# Patient Record
Sex: FEMALE | Race: BLACK OR AFRICAN AMERICAN | Employment: FULL TIME | URBAN - METROPOLITAN AREA
[De-identification: names, ages, dates, MRNs, and addresses within clinical notes are randomized per-mention and may not be internally consistent; named-entity substitution may affect disease eponyms.]

---

## 2017-01-03 RX ORDER — NAPROXEN 500 MG/1
TABLET ORAL
Qty: 60 TAB | Refills: 0 | Status: SHIPPED | OUTPATIENT
Start: 2017-01-03 | End: 2017-02-25 | Stop reason: SDUPTHER

## 2017-01-06 RX ORDER — ONDANSETRON 2 MG/ML
8 INJECTION INTRAMUSCULAR; INTRAVENOUS ONCE
Status: COMPLETED | OUTPATIENT
Start: 2017-01-10 | End: 2017-01-10

## 2017-01-06 RX ORDER — DOXORUBICIN HYDROCHLORIDE 2 MG/ML
56 INJECTION, SOLUTION INTRAVENOUS ONCE
Status: COMPLETED | OUTPATIENT
Start: 2017-01-10 | End: 2017-01-10

## 2017-01-06 RX ORDER — DEXAMETHASONE SODIUM PHOSPHATE 4 MG/ML
12 INJECTION, SOLUTION INTRA-ARTICULAR; INTRALESIONAL; INTRAMUSCULAR; INTRAVENOUS; SOFT TISSUE
Status: DISPENSED | OUTPATIENT
Start: 2017-01-10 | End: 2017-01-11

## 2017-01-06 RX ORDER — SODIUM CHLORIDE 9 MG/ML
25 INJECTION, SOLUTION INTRAVENOUS CONTINUOUS
Status: DISCONTINUED | OUTPATIENT
Start: 2017-01-10 | End: 2017-01-14 | Stop reason: HOSPADM

## 2017-01-10 ENCOUNTER — HOSPITAL ENCOUNTER (OUTPATIENT)
Dept: INFUSION THERAPY | Age: 47
Discharge: HOME | End: 2017-01-10
Payer: COMMERCIAL

## 2017-01-10 ENCOUNTER — OFFICE VISIT (OUTPATIENT)
Dept: ONCOLOGY | Age: 47
End: 2017-01-10

## 2017-01-10 ENCOUNTER — HOSPITAL ENCOUNTER (OUTPATIENT)
Dept: GENERAL RADIOLOGY | Age: 47
Discharge: HOME | End: 2017-01-10
Payer: COMMERCIAL

## 2017-01-10 VITALS
RESPIRATION RATE: 18 BRPM | BODY MASS INDEX: 46.44 KG/M2 | WEIGHT: 246 LBS | SYSTOLIC BLOOD PRESSURE: 158 MMHG | HEART RATE: 102 BPM | TEMPERATURE: 98.8 F | OXYGEN SATURATION: 98 % | HEIGHT: 61 IN | DIASTOLIC BLOOD PRESSURE: 82 MMHG

## 2017-01-10 VITALS
BODY MASS INDEX: 46.44 KG/M2 | HEIGHT: 61 IN | HEART RATE: 92 BPM | RESPIRATION RATE: 16 BRPM | TEMPERATURE: 97.4 F | WEIGHT: 246 LBS | DIASTOLIC BLOOD PRESSURE: 88 MMHG | SYSTOLIC BLOOD PRESSURE: 127 MMHG

## 2017-01-10 DIAGNOSIS — Z98.890 S/P LUMPECTOMY, LEFT BREAST: ICD-10-CM

## 2017-01-10 DIAGNOSIS — C50.912 STAGE 2 CARCINOMA OF BREAST, ER-, LEFT (HCC): Primary | ICD-10-CM

## 2017-01-10 DIAGNOSIS — R05.9 COUGH: ICD-10-CM

## 2017-01-10 DIAGNOSIS — K52.1 CHEMOTHERAPY INDUCED DIARRHEA: ICD-10-CM

## 2017-01-10 DIAGNOSIS — T45.1X5A CHEMOTHERAPY INDUCED DIARRHEA: ICD-10-CM

## 2017-01-10 DIAGNOSIS — Z17.1 STAGE 2 CARCINOMA OF BREAST, ER-, LEFT (HCC): Primary | ICD-10-CM

## 2017-01-10 LAB
ALBUMIN SERPL BCP-MCNC: 3.5 G/DL (ref 3.5–5)
ALBUMIN/GLOB SERPL: 1.1 {RATIO} (ref 1.1–2.2)
ALP SERPL-CCNC: 84 U/L (ref 45–117)
ALT SERPL-CCNC: 21 U/L (ref 12–78)
ANION GAP BLD CALC-SCNC: 10 MMOL/L (ref 5–15)
AST SERPL W P-5'-P-CCNC: 12 U/L (ref 15–37)
BASOPHILS # BLD AUTO: 0 K/UL (ref 0–0.1)
BASOPHILS # BLD: 0 % (ref 0–1)
BILIRUB SERPL-MCNC: 0.3 MG/DL (ref 0.2–1)
BUN SERPL-MCNC: 15 MG/DL (ref 6–20)
BUN/CREAT SERPL: 24 (ref 12–20)
CALCIUM SERPL-MCNC: 8.9 MG/DL (ref 8.5–10.1)
CHLORIDE SERPL-SCNC: 107 MMOL/L (ref 97–108)
CO2 SERPL-SCNC: 25 MMOL/L (ref 21–32)
CREAT SERPL-MCNC: 0.62 MG/DL (ref 0.55–1.02)
EOSINOPHIL # BLD: 0 K/UL (ref 0–0.4)
EOSINOPHIL NFR BLD: 0 % (ref 0–7)
ERYTHROCYTE [DISTWIDTH] IN BLOOD BY AUTOMATED COUNT: 15.9 % (ref 11.5–14.5)
GLOBULIN SER CALC-MCNC: 3.3 G/DL (ref 2–4)
GLUCOSE SERPL-MCNC: 227 MG/DL (ref 65–100)
HCT VFR BLD AUTO: 34.1 % (ref 35–47)
HGB BLD-MCNC: 11 G/DL (ref 11.5–16)
LYMPHOCYTES # BLD AUTO: 6 % (ref 12–49)
LYMPHOCYTES # BLD: 1 K/UL (ref 0.8–3.5)
MCH RBC QN AUTO: 28.1 PG (ref 26–34)
MCHC RBC AUTO-ENTMCNC: 32.3 G/DL (ref 30–36.5)
MCV RBC AUTO: 87.2 FL (ref 80–99)
MONOCYTES # BLD: 1 K/UL (ref 0–1)
MONOCYTES NFR BLD AUTO: 6 % (ref 5–13)
NEUTS SEG # BLD: 15.2 K/UL (ref 1.8–8)
NEUTS SEG NFR BLD AUTO: 88 % (ref 32–75)
PLATELET # BLD AUTO: 349 K/UL (ref 150–400)
POTASSIUM SERPL-SCNC: 4.5 MMOL/L (ref 3.5–5.1)
PROT SERPL-MCNC: 6.8 G/DL (ref 6.4–8.2)
RBC # BLD AUTO: 3.91 M/UL (ref 3.8–5.2)
SODIUM SERPL-SCNC: 142 MMOL/L (ref 136–145)
WBC # BLD AUTO: 17.2 K/UL (ref 3.6–11)

## 2017-01-10 PROCEDURE — 80053 COMPREHEN METABOLIC PANEL: CPT | Performed by: INTERNAL MEDICINE

## 2017-01-10 PROCEDURE — 74011250636 HC RX REV CODE- 250/636: Performed by: INTERNAL MEDICINE

## 2017-01-10 PROCEDURE — 36415 COLL VENOUS BLD VENIPUNCTURE: CPT | Performed by: INTERNAL MEDICINE

## 2017-01-10 PROCEDURE — 74011000250 HC RX REV CODE- 250: Performed by: INTERNAL MEDICINE

## 2017-01-10 PROCEDURE — 85025 COMPLETE CBC W/AUTO DIFF WBC: CPT | Performed by: INTERNAL MEDICINE

## 2017-01-10 PROCEDURE — 96367 TX/PROPH/DG ADDL SEQ IV INF: CPT

## 2017-01-10 PROCEDURE — 74011000258 HC RX REV CODE- 258: Performed by: INTERNAL MEDICINE

## 2017-01-10 PROCEDURE — 96413 CHEMO IV INFUSION 1 HR: CPT

## 2017-01-10 PROCEDURE — 96417 CHEMO IV INFUS EACH ADDL SEQ: CPT

## 2017-01-10 PROCEDURE — 71020 XR CHEST PA LAT: CPT

## 2017-01-10 PROCEDURE — 96375 TX/PRO/DX INJ NEW DRUG ADDON: CPT

## 2017-01-10 RX ORDER — BENZONATATE 100 MG/1
100 CAPSULE ORAL
Qty: 21 CAP | Refills: 1 | Status: SHIPPED | OUTPATIENT
Start: 2017-01-10 | End: 2017-01-17

## 2017-01-10 RX ORDER — HEPARIN 100 UNIT/ML
500 SYRINGE INTRAVENOUS AS NEEDED
Status: ACTIVE | OUTPATIENT
Start: 2017-01-10 | End: 2017-01-11

## 2017-01-10 RX ORDER — SODIUM CHLORIDE 0.9 % (FLUSH) 0.9 %
5-10 SYRINGE (ML) INJECTION EVERY 8 HOURS
Status: ACTIVE | OUTPATIENT
Start: 2017-01-10 | End: 2017-01-11

## 2017-01-10 RX ORDER — SODIUM CHLORIDE 9 MG/ML
10 INJECTION INTRAMUSCULAR; INTRAVENOUS; SUBCUTANEOUS AS NEEDED
Status: DISPENSED | OUTPATIENT
Start: 2017-01-10 | End: 2017-01-11

## 2017-01-10 RX ADMIN — DOXORUBICIN HYDROCHLORIDE 56 MG: 2 INJECTION, SOLUTION INTRAVENOUS at 11:43

## 2017-01-10 RX ADMIN — ONDANSETRON 8 MG: 2 INJECTION INTRAMUSCULAR; INTRAVENOUS at 10:30

## 2017-01-10 RX ADMIN — SODIUM CHLORIDE 150 MG: 900 INJECTION, SOLUTION INTRAVENOUS at 11:05

## 2017-01-10 RX ADMIN — Medication 10 ML: at 10:27

## 2017-01-10 RX ADMIN — DEXAMETHASONE SODIUM PHOSPHATE 12 MG: 4 INJECTION, SOLUTION INTRA-ARTICULAR; INTRALESIONAL; INTRAMUSCULAR; INTRAVENOUS; SOFT TISSUE at 10:27

## 2017-01-10 RX ADMIN — SODIUM CHLORIDE 25 ML/HR: 900 INJECTION, SOLUTION INTRAVENOUS at 10:26

## 2017-01-10 RX ADMIN — DOCETAXEL 170 MG: 10 INJECTION, SOLUTION INTRAVENOUS at 12:53

## 2017-01-10 RX ADMIN — CYCLOPHOSPHAMIDE 1110 MG: 1 INJECTION, POWDER, FOR SOLUTION INTRAVENOUS; ORAL at 12:00

## 2017-01-10 RX ADMIN — SODIUM CHLORIDE 10 ML: 9 INJECTION, SOLUTION INTRAMUSCULAR; INTRAVENOUS; SUBCUTANEOUS at 10:27

## 2017-01-10 RX ADMIN — Medication 10 ML: at 16:49

## 2017-01-10 RX ADMIN — SODIUM CHLORIDE, PRESERVATIVE FREE 500 UNITS: 5 INJECTION INTRAVENOUS at 16:49

## 2017-01-10 NOTE — MR AVS SNAPSHOT
Visit Information Date & Time Provider Department Dept. Phone Encounter #  
 1/10/2017  8:45 AM Janes Canela  [facility] Oncology at [address] [id number] Your Appointments 1/31/2017  8:15 AM  
Follow Up with Janes Canela  [facility] Oncology at [facility]) Appt Note: f/u infusion day [address] [facility] [address]  
[phone]  
  
   
 [address] [address] CALL [facility] [address]  
  
    
 2/21/2017  8:45 AM  
Follow Up with Janes Canela  [facility] Oncology at [facility]) Appt Note: f/u infusion day [address] [facility] [address]  
[phone] 3/14/2017  8:15 AM  
Follow Up with Janes Canela  [facility] Oncology at [facility]) Appt Note: f/u infusion day [address] [facility] [address]  
[phone] Upcoming Health Maintenance Date Due Pneumococcal 19-64 Highest Risk (1 of 3 - PCV13) 6/8/1988 DTaP/Tdap/Td series (1 - Tdap) 6/8/1990 EYE EXAM RETINAL OR DILATED Q1 6/18/2016 FOOT EXAM Q1 7/7/2016 HEMOGLOBIN A1C Q6M 1/14/2017 MICROALBUMIN Q1 7/14/2017 LIPID PANEL Q1 7/14/2017 PAP AKA CERVICAL CYTOLOGY 8/6/2017 Allergies as of 1/10/2017  Review Complete On: 1/10/2017 By: Darcy Wills LPN Severity Noted Reaction Type Reactions Codeine  02/21/2011    Diarrhea Pravastatin  08/26/2016    Myalgia Terrible leg aching. Theophyl  09/29/2014   Side Effect Diarrhea, Nausea and Vomiting Theophylline  02/21/2011    Diarrhea Current Immunizations  Reviewed on 1/10/2017 Name Date Influenza Vaccine 9/28/2016 Reviewed by Natalee Wilkinson RN on 1/10/2017 at  8:27 AM  
You Were Diagnosed With   
  
 Codes Comments Cough    -  Primary ICD-10-CM: E68 ICD-9-CM: 453. 2 Vitals BP Pulse Temp Resp Height(growth percentile) Weight(growth percentile) 158/82 (!) 102 98.8 °F (37.1 °C) (Oral) 18 5' 1\" (1.549 m) 246 lb (111.6 kg) SpO2 BMI OB Status Smoking Status 98% 46.48 kg/m2 Hysterectomy Never Smoker Vitals History BMI and BSA Data Body Mass Index Body Surface Area  
 46.48 kg/m 2 2.19 m 2 Preferred Pharmacy Pharmacy Name Phone [facility] [address] [place] [address] [address] [facility] [phone] Your Updated Medication List  
  
   
This list is accurate as of: 1/10/17  9:27 AM.  Always use your most recent med list.  
  
  
  
  
 acetaminophen 325 mg tablet Commonly known as:  TYLENOL Take 650 mg by mouth every four (4) hours as needed for Pain. * albuterol 90 mcg/actuation inhaler Commonly known as:  PROVENTIL HFA, VENTOLIN HFA, PROAIR HFA Take 2 Puffs by inhalation every four (4) hours as needed for Wheezing. * albuterol 2.5 mg /3 mL (0.083 %) nebulizer solution Commonly known as:  PROVENTIL VENTOLIN  
  
 benzonatate 100 mg capsule Commonly known as:  TESSALON Take 1 Cap by mouth three (3) times daily as needed for Cough for up to 7 days. buPROPion  mg SR tablet Commonly known as:  WELLBUTRIN SR  
TAKE 1 TABLET BY MOUTH TWICE DAILY CRANIAL PROSTHESIS Misc Wig for chemo/ breast cancer  
  
 dexamethasone 4 mg tablet Commonly known as:  DECADRON Take 2 tablets (8mg) by mouth the day before, the day off, and the day after chemotherapy. dicyclomine 10 mg capsule Commonly known as:  BENTYL Take 2 Caps by mouth four (4) times daily. diphenoxylate-atropine 2.5-0.025 mg per tablet Commonly known as:  LOMOTIL Take 1 Tab by mouth four (4) times daily as needed for Diarrhea. Max Daily Amount: 4 Tabs. FINACEA 15 % topical gel Generic drug:  azelaic acid HYDROcodone-acetaminophen 7.5-325 mg per tablet Commonly known as:  Flory Oyster Take 1 Tab by mouth every four (4) hours as needed for Pain. Max Daily Amount: 6 Tabs. hydroquinone 2 % topical cream  
Apply  to affected area two (2) times a day. lidocaine-prilocaine topical cream  
Commonly known as:  EMLA Apply  to affected area as needed for Pain. Apply 30 to 60 minutes prior to port access. Mary Pen Needle 32 gauge x 5/32\" Ndle Generic drug:  Insulin Needles (Disposable) USE AS DIRECTED TWICE DAILY  
  
 naproxen 500 mg tablet Commonly known as:  NAPROSYN  
TAKE 1 TABLET BY MOUTH TWICE DAILY WITH MEALS NovoLOG Mix 70-30 FlexPen 100 unit/mL (70-30) Inpn Generic drug:  insulin aspart protamine/insulin aspart USE 15 UNITS BEFORE BREAKFAST AND DINNER  
  
 ondansetron 8 mg disintegrating tablet Commonly known as:  ZOFRAN ODT Take 1 Tab by mouth every eight (8) hours as needed for Nausea. One Touch Delica 33 gauge Misc Generic drug:  lancets TEST THREE TIMES DAILY  
  
 ONETOUCH ULTRA TEST strip Generic drug:  glucose blood VI test strips TEST 3 TIMES A DAY prochlorperazine 10 mg tablet Commonly known as:  COMPAZINE Take 1 Tab by mouth every six (6) hours as needed. Indications: CANCER CHEMOTHERAPY-INDUCED NAUSEA AND VOMITING  
  
 tretinoin 0.025 % tpical gel VICTOZA 2-HUGO 0.6 mg/0.1 mL (18 mg/3 mL) sub-q pen Generic drug:  Liraglutide INJECT 0.3 ML(1.8MG) UNDER THE SKIN EVERY DAY  
  
 * Notice: This list has 2 medication(s) that are the same as other medications prescribed for you. Read the directions carefully, and ask your doctor or other care provider to review them with you. Prescriptions Sent to Pharmacy Refills  
 benzonatate (TESSALON) 100 mg capsule 1 Sig: Take 1 Cap by mouth three (3) times daily as needed for Cough for up to 7 days.   
 Class: Normal  
 Pharmacy: [facility] [address] [address] AT [address] [facility]  #: [phone] Route: Oral  
  
To-Do List   
 01/10/2017 Imaging:  XR CHEST PA LAT   
  
 01/12/2017 8:30 AM  
  Appointment with Catrachitabry Samantha at [address] ([phone])  
  
 01/31/2017 8:00 AM  
  Appointment with [facility] at [address] ([phone])  
  
 02/02/2017 8:30 AM  
  Appointment with Domenicamelony Samantha at [address] ([phone])  
  
 02/21/2017 8:00 AM  
  Appointment with [facility] at [address] ([phone])  
  
 02/23/2017 8:30 AM  
  Appointment with Jorge Singh at [address] [place] [address] [address] ([phone])  
  
 03/14/2017 8:00 AM  
  [facility] [address] ([phone])  
  
 03/16/2017 8:30 AM  
  Appointment with Catrachitabry Samantha at [address] [facility] [address] ([phone]) [facility]! Dear Yeison Denise: 
Thank you for requesting a [employer] account. Our records indicate that you already have an active [employer] account. You can access your account anytime at [email or web address] Did you know that you can access your hospital and ER discharge instructions at any time in [employer]? You can also review all of your test results from your hospital stay or ER visit. Additional Information If you have questions, please visit the Frequently Asked Questions section of the [employer] website at [email or web address] Remember, AgentBridge is NOT to be used for urgent needs. For medical emergencies, dial 911. Now available from your iPhone and Android! Please provide this summary of care documentation to your next provider. Your primary care clinician is listed as Buddy Sharma. If you have any questions after today's visit, please call [phone].

## 2017-01-10 NOTE — PROGRESS NOTES
[facility] Note:  [id number] arrived at [facility] ambulatory and in no distress for C3  Assessment stable no new complaints voiced. Saw Gilbert Loges. CXR normal.    Pt got headache after Adriamycin. Pushing slower might make difference or pt is tired from working nights. Medications received:  Decadron  Zofran  Emend  Adriamycin  Cytoxan  Taxotere      1500 Tolerated treatment well, no adverse reaction noted. D/Cd from [facility] ambulatory and in no distress accompanied by family  Next appt 0830  1/12   And 1/31 chemo  Visit Vitals    Ht 5' 1\" (1.549 m)    Wt 111.6 kg (246 lb)    BMI 46.48 kg/m2     Recent Results (from the past 12 hour(s))   CBC WITH AUTOMATED DIFF    Collection Time: 01/10/17  8:36 AM   Result Value Ref Range    WBC 17.2 (H) 3.6 - 11.0 K/uL    RBC 3.91 3.80 - 5.20 M/uL    HGB 11.0 (L) 11.5 - 16.0 g/dL    HCT 34.1 (L) 35.0 - 47.0 %    MCV 87.2 80.0 - 99.0 FL    MCH 28.1 26.0 - 34.0 PG    MCHC 32.3 30.0 - 36.5 g/dL    RDW 15.9 (H) 11.5 - 14.5 %    PLATELET 609 311 - 931 K/uL    NEUTROPHILS 88 (H) 32 - 75 %    LYMPHOCYTES 6 (L) 12 - 49 %    MONOCYTES 6 5 - 13 %    EOSINOPHILS 0 0 - 7 %    BASOPHILS 0 0 - 1 %    ABS. NEUTROPHILS 15.2 (H) 1.8 - 8.0 K/UL    ABS. LYMPHOCYTES 1.0 0.8 - 3.5 K/UL    ABS. MONOCYTES 1.0 0.0 - 1.0 K/UL    ABS. EOSINOPHILS 0.0 0.0 - 0.4 K/UL    ABS. BASOPHILS 0.0 0.0 - 0.1 K/UL   METABOLIC PANEL, COMPREHENSIVE    Collection Time: 01/10/17  8:36 AM   Result Value Ref Range    Sodium 142 136 - 145 mmol/L    Potassium 4.5 3.5 - 5.1 mmol/L    Chloride 107 97 - 108 mmol/L    CO2 25 21 - 32 mmol/L    Anion gap 10 5 - 15 mmol/L    Glucose 227 (H) 65 - 100 mg/dL    BUN 15 6 - 20 MG/DL    Creatinine 0.62 0.55 - 1.02 MG/DL    BUN/Creatinine ratio 24 (H) 12 - 20      GFR est AA >60 >60 ml/min/1.73m2    GFR est non-AA >60 >60 ml/min/1.73m2    Calcium 8.9 8.5 - 10.1 MG/DL    Bilirubin, total 0.3 0.2 - 1.0 MG/DL    ALT 21 12 - 78 U/L    AST 12 (L) 15 - 37 U/L    Alk.  phosphatase 84 45 - 117 U/L    Protein, total 6.8 6.4 - 8.2 g/dL    Albumin 3.5 3.5 - 5.0 g/dL    Globulin 3.3 2.0 - 4.0 g/dL    A-G Ratio 1.1 1.1 - 2.2

## 2017-01-10 NOTE — PROGRESS NOTES
HEME/ONC PROGRESS NOTE       Christ Green is a 51 y.o. 1970 female and presents with Follow-up; Breast Cancer; and Cough    CC  Triple neg left breast cancer 8/16    HPI  Pt seen today for office f/u for breast cancer  She is post lump/ reduction surgery. Patient is here for cycle 3 adjuvant chemotherapy with TAC. She reports she had nasal drainage, cough that is non-productive. She denies shortness of breath. She does have an inhaler and has started to use it recently. She has noticed wheezing and has a history of asthma. She reports she overall feels well but has a persistent cough which has stayed the same. She denies headaches. She has taken no medications for the cough besides Mucinex which she has been taking once daily. She has had no fevers or chills. She reports she did have diarrhea following cycle 2 of chemotherapy. She ate BRAT diet and she noticed relief while taking Zofran. She denies nausea or vomiting. She does notice tingling to her fingers and toes bilaterally. She reports symptoms are intermittent and she denies pain. She does have a history of diabetes and has experienced neuropathy in the past. She denies pain. She has noticed fatigue for the days after chemotherapy    Last visit:  consult for new triple neg left breast cancer. Abnormal mammo 6/16. Biopsy 8/16 triple neg IDC. MRI breast 8mm mass with another area of DCIS. Lumpectomy/ reuction 10/16 shows 3 cm mass with neg LN 0/4. Pt has hx of DM/ asthma/ IBS/ medical noncompliance at PCP. Pt is here to discuss adjuvant therapy. 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 Here with son. Port by surgery. DX   Encounter Diagnoses   Name Primary?     Cough Yes    Stage 2 carcinoma of breast, ER-, left (HCC)     S/P lumpectomy, left breast     Chemotherapy induced diarrhea         STAGE: T2N0 triple neg    TREATMENT COURSE: lumpectomy 10/16 with b/l reduction      Past Medical History   Diagnosis Date    Arthritis     Asthma  Cancer ([facility] [address]      BREAST left    Chronic pain     Diabetes (HCC)     Dyslipidemia     IBS (irritable bowel syndrome)     Nausea & vomiting     Noncompliance     Obesity     Psychiatric disorder     S/P breast biopsy, left 16    Sinusitis     Stress at home      Past Surgical History   Procedure Laterality Date    Hx hysterectomy       full    Hx  section       X2    Hx gyn       ABLATION    Hx hernia repair       AS INFANT    Hx orthopaedic       L THUMB    Hx breast lumpectomy Left 10/13/2016     LEFT BREAST REDUCTION LUMPECTOMY, LEFT SENTINEL NODE BIOPSY, LEFT BRACKETED NEEDLE LOCALIZATION, LEFT BREAST RECONSTRUCTION performed by Josie Sue MD at [address] Hx breast reconstruction Bilateral 10/13/2016     BREAST REDUCTION performed by Deondre Burger MD at [address] [facility] [address] History     Social History    Marital status: SINGLE     Spouse name: N/A    Number of children: N/A    Years of education: N/A     Social History Main Topics    Smoking status: Never Smoker    Smokeless tobacco: Never Used    Alcohol use No    Drug use: No    Sexual activity: Not Asked     Other Topics Concern    None     Social History Narrative    ** Merged History Encounter **          Family History   Problem Relation Age of Onset    Heart Disease Mother     Hypertension Mother     Heart Disease Father     Seizures Sister     Anesth Problems Neg Hx        Current Outpatient Prescriptions   Medication Sig Dispense Refill    benzonatate (TESSALON) 100 mg capsule Take 1 Cap by mouth three (3) times daily as needed for Cough for up to 7 days.  21 Cap 1    naproxen (NAPROSYN) 500 mg tablet TAKE 1 TABLET BY MOUTH TWICE DAILY WITH MEALS 60 Tab 0    buPROPion SR (WELLBUTRIN SR) 150 mg SR tablet TAKE 1 TABLET BY MOUTH TWICE DAILY 30 Tab 3    ONE TOUCH DELICA 33 gauge misc TEST THREE TIMES DAILY 100 Lancet 11    diphenoxylate-atropine (LOMOTIL) 2.5-0.025 mg per tablet Take 1 Tab by mouth four (4) times daily as needed for Diarrhea. Max Daily Amount: 4 Tabs. 30 Tab 1    lidocaine-prilocaine (EMLA) topical cream Apply  to affected area as needed for Pain. Apply 30 to 60 minutes prior to port access. 30 g 0    dexamethasone (DECADRON) 4 mg tablet Take 2 tablets (8mg) by mouth the day before, the day off, and the day after chemotherapy. 30 Tab 1    ondansetron (ZOFRAN ODT) 8 mg disintegrating tablet Take 1 Tab by mouth every eight (8) hours as needed for Nausea. 30 Tab 2    prochlorperazine (COMPAZINE) 10 mg tablet Take 1 Tab by mouth every six (6) hours as needed. Indications: CANCER CHEMOTHERAPY-INDUCED NAUSEA AND VOMITING 30 Tab 2    ONETOUCH ULTRA TEST strip TEST 3 TIMES A  Strip 11    albuterol (PROVENTIL VENTOLIN) 2.5 mg /3 mL (0.083 %) nebulizer solution       acetaminophen (TYLENOL) 325 mg tablet Take 650 mg by mouth every four (4) hours as needed for Pain.  CRANIAL PROSTHESIS misc Wig for chemo/ breast cancer 2 Each 1    hydroquinone 2 % topical cream Apply  to affected area two (2) times a day.  VICTOZA 2-HUGO 0.6 mg/0.1 mL (18 mg/3 mL) sub-q pen INJECT 0.3 ML(1.8MG) UNDER THE SKIN EVERY DAY 6 mL 11    dicyclomine (BENTYL) 10 mg capsule Take 2 Caps by mouth four (4) times daily. (Patient taking differently: Take 20 mg by mouth four (4) times daily. Patient stated she usually takes 10mg twice daily) 120 Cap 11    NOVOLOG MIX 70-30 FLEXPEN 100 unit/mL (70-30) inpn USE 15 UNITS BEFORE BREAKFAST AND DINNER 15 mL 11    KELLY PEN NEEDLE 32 gauge x 5/32\" ndle USE AS DIRECTED TWICE DAILY 100 Pen Needle 11    FINACEA 15 % topical gel   5    tretinoin 0.025 % tpical gel   3    albuterol (PROVENTIL HFA, VENTOLIN HFA, PROAIR HFA) 90 mcg/actuation inhaler Take 2 Puffs by inhalation every four (4) hours as needed for Wheezing.  1 Inhaler 11    HYDROcodone-acetaminophen (NORCO) 7.5-325 mg per tablet Take 1 Tab by mouth every four (4) hours as needed for Pain. Max Daily Amount: 6 Tabs. 35 Tab 0     Facility-Administered Medications Ordered in Other Visits   Medication Dose Route Frequency Provider Last Rate Last Dose    heparin (porcine) pf 500 Units  500 Units IntraVENous PRN Tamiko Bombard, DO   500 Units at 01/10/17 1649    sodium chloride 0.9 % injection 10 mL  10 mL IntraVENous PRN Tamiko Bombard, DO   10 mL at 01/10/17 1027    [START ON 1/12/2017] pegfilgrastim (NEULASTA) injection 6 mg  6 mg SubCUTAneous ONCE Tamiko Bombard, DO        dexamethasone (DECADRON) 4 mg/mL injection 12 mg  12 mg IntraVENous ONCE PRN Tamiko Bombard, DO   12 mg at 01/10/17 1027    0.9% sodium chloride infusion  25 mL/hr IntraVENous CONTINUOUS Tamiko Bombard, DO   Stopped at 01/10/17 1500       Allergies   Allergen Reactions    Codeine Diarrhea    Pravastatin Myalgia     Terrible leg aching.  Theophyl Diarrhea and Nausea and Vomiting    Theophylline Diarrhea       Review of Systems    A comprehensive review of systems was negative except for: per HPI     Objective:  Visit Vitals    /82    Pulse (!) 102    Temp 98.8 °F (37.1 °C) (Oral)    Resp 18    Ht 5' 1\" (1.549 m)    Wt 246 lb (111.6 kg)    SpO2 98%  Comment: RA    BMI 46.48 kg/m2         Physical Exam:   General appearance - alert, well appearing, and in no distress, oriented to person, place, and time and overweight  Mental status - alert, oriented to person, place, and time, normal mood, behavior, speech, dress, motor activity, and thought processes  EYE-conj clear  Mouth - mucous membranes moist   Neck - supple  Chest - clear to auscultation bilaterally  Heart - normal rate and regular rhythm   Abdomen - round, soft, nontender  Ext-no pedal edema noted  Skin-Warm and dry. Intact without rash.    Neuro -alert, oriented, normal speech, no focal findings or movement disorder noted    Diagnostic Imaging   reviewed  XR Results (most recent):    [facility] on 01/10/17   XR CHEST PA LAT   Narrative EXAM:  XR CHEST PA LAT    INDICATION:  Cough for 2 weeks. History of breast cancer. Currently undergoing  chemotherapy. COMPARISON: 11/17/2016. TECHNIQUE: PA and lateral chest views    FINDINGS: The right chest Port-A-Cath is slightly retracted terminating in  profile with the right subclavian vein. The cardiomediastinal contours are  stable. The pulmonary vasculature is within normal limits. The lungs and pleural spaces are clear. There is no pneumothorax. The bones and  upper abdomen are stable. Impression IMPRESSION:    The lungs and pleural spaces are clear. The right chest Port-A-Cath terminates  in profile with the right subclavian vein. Lab Results  reviewed  Lab Results   Component Value Date/Time    WBC 17.2 01/10/2017 08:36 AM    HGB 11.0 01/10/2017 08:36 AM    HCT 34.1 01/10/2017 08:36 AM    PLATELET 670 33/28/1591 08:36 AM    MCV 87.2 01/10/2017 08:36 AM       Lab Results   Component Value Date/Time    Sodium 142 01/10/2017 08:36 AM    Potassium 4.5 01/10/2017 08:36 AM    Chloride 107 01/10/2017 08:36 AM    CO2 25 01/10/2017 08:36 AM    Anion gap 10 01/10/2017 08:36 AM    Glucose 227 01/10/2017 08:36 AM    BUN 15 01/10/2017 08:36 AM    Creatinine 0.62 01/10/2017 08:36 AM    BUN/Creatinine ratio 24 01/10/2017 08:36 AM    GFR est AA >60 01/10/2017 08:36 AM    GFR est non-AA >60 01/10/2017 08:36 AM    Calcium 8.9 01/10/2017 08:36 AM    ALT 21 01/10/2017 08:36 AM    AST 12 01/10/2017 08:36 AM    Alk. phosphatase 84 01/10/2017 08:36 AM    Protein, total 6.8 01/10/2017 08:36 AM    Albumin 3.5 01/10/2017 08:36 AM    Globulin 3.3 01/10/2017 08:36 AM    A-G Ratio 1.1 01/10/2017 08:36 AM       Assessment/Plan:     Kalani Galarza is a  51 y.o. female and presents with Breast Cancer    CC  Triple neg left breast cancer 8/16    HPI  Pt seen today for office fu for triple neg left breast cancer. DX   Encounter Diagnoses   Name Primary?     Triple negative malignant neoplasm of breast ([facility] [address] Yes    S/P lumpectomy, left breast     Stage 2 carcinoma of breast, ER-, left ([facility] [address]     [facility] to [facility]     Type 2 diabetes mellitus without complication, unspecified long term insulin use status ([facility]     Irritable bowel syndrome without diarrhea     Essential hypertension       STAGE: T2N0 triple neg    TREATMENT COURSE: lumpectomy 101/6    1. Stage 2 triple neg breast cancer s/p lumpectomy/ b/l breast reduction/. She is here for cycle 3 adjuvant chemotherapy with TAC today. She tolerated cycle 2 better. Labs and VS are stable today. Chest xray ordered and was negative today. Will proceed with chemotherapy as ordered. 2. Diarrhea. Patient managed diarrhea with diet changes, no anti-diarrheal medication required. She does reports she took Zofran scheduled after chemotherapy which seemed to help with diarrhea as well. 3. HTN/ asthma/ DM. Per PCP. Blood glucose is elevated at home on the days she takes steroids per report. Blood glucose 227 today. 4. Cough. Cough has continued for about 3 weeks, non-productive. She reports she has no shortness of breath and no fevers. She has a history of asthma and has been using her inhaler as needed. Chest xray ordered today and was negative. Patient was encouraged to follow-up with PCP due to history of asthma. Advised to call with worsened symptoms and can send in antibiotic if needed. Seen in conjunction with Obi Moulton NP. Follow-up in 3 weeks with next chemotherapy. ICD-10-CM ICD-9-CM    1. Cough R05 786.2 XR CHEST PA LAT   2. Stage 2 carcinoma of breast, ER-, left (HCC) C50.912 174.9     Z17.1 V86.1    3. S/P lumpectomy, left breast Z90.12 V45.89    4.  Chemotherapy induced diarrhea K52.1 787.91     T45.1X5A E933.1        Current Outpatient Prescriptions   Medication Sig    benzonatate (TESSALON) 100 mg capsule Take 1 Cap by mouth three (3) times daily as needed for Cough for up to 7 days.  naproxen (NAPROSYN) 500 mg tablet TAKE 1 TABLET BY MOUTH TWICE DAILY WITH MEALS    buPROPion SR (WELLBUTRIN SR) 150 mg SR tablet TAKE 1 TABLET BY MOUTH TWICE DAILY    ONE TOUCH DELICA 33 gauge misc TEST THREE TIMES DAILY    diphenoxylate-atropine (LOMOTIL) 2.5-0.025 mg per tablet Take 1 Tab by mouth four (4) times daily as needed for Diarrhea. Max Daily Amount: 4 Tabs.  lidocaine-prilocaine (EMLA) topical cream Apply  to affected area as needed for Pain. Apply 30 to 60 minutes prior to port access.  dexamethasone (DECADRON) 4 mg tablet Take 2 tablets (8mg) by mouth the day before, the day off, and the day after chemotherapy.  ondansetron (ZOFRAN ODT) 8 mg disintegrating tablet Take 1 Tab by mouth every eight (8) hours as needed for Nausea.  prochlorperazine (COMPAZINE) 10 mg tablet Take 1 Tab by mouth every six (6) hours as needed. Indications: CANCER CHEMOTHERAPY-INDUCED NAUSEA AND VOMITING    ONETOUCH ULTRA TEST strip TEST 3 TIMES A DAY    albuterol (PROVENTIL VENTOLIN) 2.5 mg /3 mL (0.083 %) nebulizer solution     acetaminophen (TYLENOL) 325 mg tablet Take 650 mg by mouth every four (4) hours as needed for Pain.  CRANIAL PROSTHESIS misc Wig for chemo/ breast cancer    hydroquinone 2 % topical cream Apply  to affected area two (2) times a day.  VICTOZA 2-HUGO 0.6 mg/0.1 mL (18 mg/3 mL) sub-q pen INJECT 0.3 ML(1.8MG) UNDER THE SKIN EVERY DAY    dicyclomine (BENTYL) 10 mg capsule Take 2 Caps by mouth four (4) times daily. (Patient taking differently: Take 20 mg by mouth four (4) times daily.  Patient stated she usually takes 10mg twice daily)    NOVOLOG MIX 70-30 FLEXPEN 100 unit/mL (70-30) inpn USE 15 UNITS BEFORE BREAKFAST AND DINNER    KELLY PEN NEEDLE 32 gauge x 5/32\" ndle USE AS DIRECTED TWICE DAILY    FINACEA 15 % topical gel     tretinoin 0.025 % tpical gel     albuterol (PROVENTIL HFA, VENTOLIN HFA, PROAIR HFA) 90 mcg/actuation inhaler Take 2 Puffs by inhalation every four (4) hours as needed for Wheezing.  HYDROcodone-acetaminophen (NORCO) 7.5-325 mg per tablet Take 1 Tab by mouth every four (4) hours as needed for Pain. Max Daily Amount: 6 Tabs. No current facility-administered medications for this visit. Facility-Administered Medications Ordered in Other Visits   Medication Dose Route Frequency    heparin (porcine) pf 500 Units  500 Units IntraVENous PRN    sodium chloride 0.9 % injection 10 mL  10 mL IntraVENous PRN    [START ON 1/12/2017] pegfilgrastim (NEULASTA) injection 6 mg  6 mg SubCUTAneous ONCE    dexamethasone (DECADRON) 4 mg/mL injection 12 mg  12 mg IntraVENous ONCE PRN    0.9% sodium chloride infusion  25 mL/hr IntraVENous CONTINUOUS       continue present plan, call if any problems  There are no Patient Instructions on file for this visit. Follow-up Disposition:  Return in about 3 weeks (around 1/31/2017).     Mara Chapman NP

## 2017-01-10 NOTE — PROGRESS NOTES
50 y/o female here for f/u appt for ca breast. Receiving cycle 3 of TAC. Pt has been having congestion for about 3 weeks. Mucinex once nightly and has increased fluid intake. Constantly trying to clear her throat. Uses inhaler more. She reports she talks constantly at her job so this makes it worse. Pt is very tired. Ms. Eusebio James has a reminder for a \"due or due soon\" health maintenance. I have asked that she contact her primary care provider for follow-up on this health maintenance.

## 2017-01-12 ENCOUNTER — HOSPITAL ENCOUNTER (OUTPATIENT)
Dept: INFUSION THERAPY | Age: 47
Discharge: HOME | End: 2017-01-12
Payer: COMMERCIAL

## 2017-01-12 VITALS
TEMPERATURE: 98.4 F | HEART RATE: 81 BPM | RESPIRATION RATE: 18 BRPM | SYSTOLIC BLOOD PRESSURE: 117 MMHG | DIASTOLIC BLOOD PRESSURE: 72 MMHG

## 2017-01-12 NOTE — PROGRESS NOTES
Outpatient Infusion [facility] Short Visit Progress Note    0815 Pt admit to [place] for Neulasta ambulatory in stable condition. Assessment completed. No new concerns voiced. Visit Vitals    /72    Pulse 81    Temp 98.4 °F (36.9 °C)    Resp 18           Medications:  Neulasta SQ right arm     0835 Pt tolerated treatment well. D/c home ambulatory in no distress. Pt aware of next appointment scheduled for 1/31/17.

## 2017-01-27 RX ORDER — SODIUM CHLORIDE 9 MG/ML
25 INJECTION, SOLUTION INTRAVENOUS CONTINUOUS
Status: DISCONTINUED | OUTPATIENT
Start: 2017-01-31 | End: 2017-02-04 | Stop reason: HOSPADM

## 2017-01-27 RX ORDER — DEXAMETHASONE SODIUM PHOSPHATE 4 MG/ML
12 INJECTION, SOLUTION INTRA-ARTICULAR; INTRALESIONAL; INTRAMUSCULAR; INTRAVENOUS; SOFT TISSUE
Status: ACTIVE | OUTPATIENT
Start: 2017-01-31 | End: 2017-02-01

## 2017-01-27 RX ORDER — DOXORUBICIN HYDROCHLORIDE 2 MG/ML
56 INJECTION, SOLUTION INTRAVENOUS ONCE
Status: DISCONTINUED | OUTPATIENT
Start: 2017-01-31 | End: 2017-01-31 | Stop reason: DRUGHIGH

## 2017-01-27 RX ORDER — ONDANSETRON 2 MG/ML
8 INJECTION INTRAMUSCULAR; INTRAVENOUS ONCE
Status: COMPLETED | OUTPATIENT
Start: 2017-01-31 | End: 2017-01-31

## 2017-01-31 ENCOUNTER — OFFICE VISIT (OUTPATIENT)
Dept: ONCOLOGY | Age: 47
End: 2017-01-31

## 2017-01-31 ENCOUNTER — HOSPITAL ENCOUNTER (OUTPATIENT)
Dept: INFUSION THERAPY | Age: 47
Discharge: HOME | End: 2017-01-31
Payer: COMMERCIAL

## 2017-01-31 ENCOUNTER — DOCUMENTATION ONLY (OUTPATIENT)
Dept: ONCOLOGY | Age: 47
End: 2017-01-31

## 2017-01-31 VITALS
HEIGHT: 61 IN | RESPIRATION RATE: 16 BRPM | OXYGEN SATURATION: 99 % | SYSTOLIC BLOOD PRESSURE: 144 MMHG | HEART RATE: 84 BPM | DIASTOLIC BLOOD PRESSURE: 84 MMHG | BODY MASS INDEX: 46.44 KG/M2 | TEMPERATURE: 99.2 F | WEIGHT: 246 LBS

## 2017-01-31 VITALS
SYSTOLIC BLOOD PRESSURE: 130 MMHG | HEIGHT: 61 IN | TEMPERATURE: 98.1 F | BODY MASS INDEX: 46.54 KG/M2 | DIASTOLIC BLOOD PRESSURE: 84 MMHG | RESPIRATION RATE: 16 BRPM | OXYGEN SATURATION: 98 % | WEIGHT: 246.5 LBS | HEART RATE: 99 BPM

## 2017-01-31 DIAGNOSIS — C50.912 STAGE 2 CARCINOMA OF BREAST, ER-, LEFT (HCC): Primary | ICD-10-CM

## 2017-01-31 DIAGNOSIS — K59.00 CONSTIPATION, UNSPECIFIED CONSTIPATION TYPE: ICD-10-CM

## 2017-01-31 DIAGNOSIS — Z09 CHEMOTHERAPY FOLLOW-UP EXAMINATION: ICD-10-CM

## 2017-01-31 DIAGNOSIS — Z17.1 STAGE 2 CARCINOMA OF BREAST, ER-, LEFT (HCC): Primary | ICD-10-CM

## 2017-01-31 DIAGNOSIS — Z98.890 S/P LUMPECTOMY, LEFT BREAST: ICD-10-CM

## 2017-01-31 LAB
ALBUMIN SERPL BCP-MCNC: 3.4 G/DL (ref 3.5–5)
ALBUMIN/GLOB SERPL: 1 {RATIO} (ref 1.1–2.2)
ALP SERPL-CCNC: 74 U/L (ref 45–117)
ALT SERPL-CCNC: 22 U/L (ref 12–78)
ANION GAP BLD CALC-SCNC: 8 MMOL/L (ref 5–15)
AST SERPL W P-5'-P-CCNC: 10 U/L (ref 15–37)
BASO+EOS+MONOS # BLD AUTO: 1.2 K/UL (ref 0.2–1.2)
BASO+EOS+MONOS # BLD AUTO: 7 % (ref 3.2–16.9)
BILIRUB SERPL-MCNC: 0.3 MG/DL (ref 0.2–1)
BUN SERPL-MCNC: 17 MG/DL (ref 6–20)
BUN/CREAT SERPL: 24 (ref 12–20)
CALCIUM SERPL-MCNC: 8.9 MG/DL (ref 8.5–10.1)
CHLORIDE SERPL-SCNC: 105 MMOL/L (ref 97–108)
CO2 SERPL-SCNC: 26 MMOL/L (ref 21–32)
CREAT SERPL-MCNC: 0.72 MG/DL (ref 0.55–1.02)
DIFFERENTIAL METHOD BLD: ABNORMAL
ERYTHROCYTE [DISTWIDTH] IN BLOOD BY AUTOMATED COUNT: 16.7 % (ref 11.8–15.8)
GLOBULIN SER CALC-MCNC: 3.3 G/DL (ref 2–4)
GLUCOSE SERPL-MCNC: 230 MG/DL (ref 65–100)
HCT VFR BLD AUTO: 30.9 % (ref 35–47)
HGB BLD-MCNC: 9.9 G/DL (ref 11.5–16)
LYMPHOCYTES # BLD AUTO: 6 % (ref 12–49)
LYMPHOCYTES # BLD: 1.1 K/UL (ref 0.8–3.5)
MCH RBC QN AUTO: 28.2 PG (ref 26–34)
MCHC RBC AUTO-ENTMCNC: 32 G/DL (ref 30–36.5)
MCV RBC AUTO: 88 FL (ref 80–99)
NEUTS SEG # BLD: 14.7 K/UL (ref 1.8–8)
NEUTS SEG NFR BLD AUTO: 87 % (ref 32–75)
PLATELET # BLD AUTO: 446 K/UL (ref 150–400)
POTASSIUM SERPL-SCNC: 3.7 MMOL/L (ref 3.5–5.1)
PROT SERPL-MCNC: 6.7 G/DL (ref 6.4–8.2)
RBC # BLD AUTO: 3.51 M/UL (ref 3.8–5.2)
SODIUM SERPL-SCNC: 139 MMOL/L (ref 136–145)
WBC # BLD AUTO: 17 K/UL (ref 3.6–11)

## 2017-01-31 PROCEDURE — 80053 COMPREHEN METABOLIC PANEL: CPT | Performed by: INTERNAL MEDICINE

## 2017-01-31 PROCEDURE — 96411 CHEMO IV PUSH ADDL DRUG: CPT

## 2017-01-31 PROCEDURE — 96417 CHEMO IV INFUS EACH ADDL SEQ: CPT

## 2017-01-31 PROCEDURE — 96366 THER/PROPH/DIAG IV INF ADDON: CPT

## 2017-01-31 PROCEDURE — 74011000250 HC RX REV CODE- 250: Performed by: INTERNAL MEDICINE

## 2017-01-31 PROCEDURE — 74011250636 HC RX REV CODE- 250/636: Performed by: INTERNAL MEDICINE

## 2017-01-31 PROCEDURE — 77030012965 HC NDL HUBR BBMI -A

## 2017-01-31 PROCEDURE — 74011000258 HC RX REV CODE- 258: Performed by: INTERNAL MEDICINE

## 2017-01-31 PROCEDURE — 36415 COLL VENOUS BLD VENIPUNCTURE: CPT | Performed by: INTERNAL MEDICINE

## 2017-01-31 PROCEDURE — 85025 COMPLETE CBC W/AUTO DIFF WBC: CPT | Performed by: INTERNAL MEDICINE

## 2017-01-31 PROCEDURE — 96375 TX/PRO/DX INJ NEW DRUG ADDON: CPT

## 2017-01-31 PROCEDURE — 96413 CHEMO IV INFUSION 1 HR: CPT

## 2017-01-31 RX ORDER — SODIUM CHLORIDE 0.9 % (FLUSH) 0.9 %
5-10 SYRINGE (ML) INJECTION AS NEEDED
Status: DISCONTINUED | OUTPATIENT
Start: 2017-01-31 | End: 2017-02-04 | Stop reason: HOSPADM

## 2017-01-31 RX ORDER — SODIUM CHLORIDE 9 MG/ML
10 INJECTION INTRAMUSCULAR; INTRAVENOUS; SUBCUTANEOUS AS NEEDED
Status: ACTIVE | OUTPATIENT
Start: 2017-01-31 | End: 2017-02-01

## 2017-01-31 RX ORDER — DOXORUBICIN HYDROCHLORIDE 2 MG/ML
56 INJECTION, SOLUTION INTRAVENOUS ONCE
Status: COMPLETED | OUTPATIENT
Start: 2017-01-31 | End: 2017-01-31

## 2017-01-31 RX ORDER — HEPARIN 100 UNIT/ML
500 SYRINGE INTRAVENOUS AS NEEDED
Status: ACTIVE | OUTPATIENT
Start: 2017-01-31 | End: 2017-02-01

## 2017-01-31 RX ORDER — DOXORUBICIN HYDROCHLORIDE 2 MG/ML
54 INJECTION, SOLUTION INTRAVENOUS ONCE
Status: COMPLETED | OUTPATIENT
Start: 2017-01-31 | End: 2017-01-31

## 2017-01-31 RX ADMIN — DOXORUBICIN HYDROCHLORIDE 54 MG: 2 INJECTION, SOLUTION INTRAVENOUS at 12:30

## 2017-01-31 RX ADMIN — SODIUM CHLORIDE 25 ML/HR: 900 INJECTION, SOLUTION INTRAVENOUS at 09:41

## 2017-01-31 RX ADMIN — DOXORUBICIN HYDROCHLORIDE 56 MG: 2 INJECTION, SOLUTION INTRAVENOUS at 12:30

## 2017-01-31 RX ADMIN — Medication 10 ML: at 08:26

## 2017-01-31 RX ADMIN — Medication 10 ML: at 10:31

## 2017-01-31 RX ADMIN — Medication 10 ML: at 09:41

## 2017-01-31 RX ADMIN — DOCETAXEL 130 MG: 10 INJECTION, SOLUTION INTRAVENOUS at 13:28

## 2017-01-31 RX ADMIN — Medication 10 ML: at 08:28

## 2017-01-31 RX ADMIN — SODIUM CHLORIDE 10 ML: 9 INJECTION, SOLUTION INTRAMUSCULAR; INTRAVENOUS; SUBCUTANEOUS at 08:27

## 2017-01-31 RX ADMIN — Medication 10 ML: at 09:40

## 2017-01-31 RX ADMIN — Medication 500 UNITS: at 14:37

## 2017-01-31 RX ADMIN — ONDANSETRON 8 MG: 2 INJECTION INTRAMUSCULAR; INTRAVENOUS at 10:31

## 2017-01-31 RX ADMIN — CYCLOPHOSPHAMIDE 1100 MG: 1 INJECTION, POWDER, FOR SOLUTION INTRAVENOUS; ORAL at 12:40

## 2017-01-31 RX ADMIN — Medication 10 ML: at 14:37

## 2017-01-31 RX ADMIN — SODIUM CHLORIDE 150 MG: 900 INJECTION, SOLUTION INTRAVENOUS at 11:33

## 2017-01-31 NOTE — PROGRESS NOTES
HEME/ONC PROGRESS NOTE       Ronn Loja is a 51 y.o. 1970 female and presents with Breast Cancer    CC  Triple neg left breast cancer 8/16    HPI:  Pt seen today for office f/u for breast cancer  She is post lump/ reduction surgery. Patient is here for cycle 4 adjuvant chemotherapy with TAC today. She had an episode of feeling lightheaded/dizzy, sweating, and nausea while she was out at the [facility] on 1/19/17. She came home and laid down on the couch and had several episodes of vomiting with improvement in symptoms afterwards. She does have a history of diabetes and monitors her blood glucose frequently at home. She reports typically her blood glucose runs in the 200's, although she did not check it at the time of her \"episode. \" She had no further vomiting, lightheadedness, or feeling as if she was going to pass out. She did not lose consciousness. She reports she remains fatigued and sleeps all day, then works all night. She has been sleeping more than usual due to increased fatigue on chemotherapy. She does continue to note dyspnea on exertion that is unchanged and stable. Her cough has improved. She has had hemorrhoids that have improved with Miralax. Constipation has resolved. The hemorrhoids are no longer bleeding. She denies fevers or chills. She has had no headaches. She does note that she has had to increase the font on her computer at work. She is noticing tingling all the time in her fingertips and toes. She is walking on the sides of her feet due to the neuropathy. She denies pain to her hands or feet. Initial visit:  consult for new triple neg left breast cancer. Abnormal mammo 6/16. Biopsy 8/16 triple neg IDC. MRI breast 8mm mass with another area of DCIS. Lumpectomy/ reuction 10/16 shows 3 cm mass with neg LN 0/4. Pt has hx of DM/ asthma/ IBS/ medical noncompliance at PCP. Pt is here to discuss adjuvant therapy. 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 Here with sonReema Torres by surgery. DX   Encounter Diagnoses   Name Primary?     Stage 2 carcinoma of breast, ER-, left (HCC) Yes    S/P lumpectomy, left breast     Chemotherapy follow-up examination     Constipation, unspecified constipation type         STAGE: T2N0 triple neg    TREATMENT COURSE: lumpectomy 10/16 with b/l reduction, Adjuvant chemotherapy with TAC      Past Medical History   Diagnosis Date    Arthritis     Asthma     Cancer ([facility]      BREAST left    Chronic pain     Diabetes ([facility]     Dyslipidemia     IBS (irritable bowel syndrome)     Nausea & vomiting     Noncompliance     Obesity     Psychiatric disorder     S/P breast biopsy, left 16    Sinusitis     Stress at home      Past Surgical History   Procedure Laterality Date    Hx hysterectomy       full    Hx  section       X2    Hx gyn       ABLATION    Hx hernia repair       AS INFANT    Hx orthopaedic       L THUMB    Hx breast lumpectomy Left 10/13/2016     LEFT BREAST REDUCTION LUMPECTOMY, LEFT SENTINEL NODE BIOPSY, LEFT BRACKETED NEEDLE LOCALIZATION, LEFT BREAST RECONSTRUCTION performed by Sissy Hebert MD at [address] Hx breast reconstruction Bilateral 10/13/2016     BREAST REDUCTION performed by Terry Aiken MD at [address] [facility] [address] History     Social History    Marital status: SINGLE     Spouse name: N/A    Number of children: N/A    Years of education: N/A     Social History Main Topics    Smoking status: Never Smoker    Smokeless tobacco: Never Used    Alcohol use No    Drug use: No    Sexual activity: Not Asked     Other Topics Concern    None     Social History Narrative    ** Merged History Encounter **          Family History   Problem Relation Age of Onset    Heart Disease Mother     Hypertension Mother     Heart Disease Father     Seizures Sister     Anesth Problems Neg Hx        Current Outpatient Prescriptions   Medication Sig Dispense Refill    buPROPion SR (WELLBUTRIN SR) 150 mg SR tablet TAKE 1 TABLET BY MOUTH TWICE DAILY 30 Tab 3    ONE TOUCH DELICA 33 gauge misc TEST THREE TIMES DAILY 100 Lancet 11    lidocaine-prilocaine (EMLA) topical cream Apply  to affected area as needed for Pain. Apply 30 to 60 minutes prior to port access. 30 g 0    dexamethasone (DECADRON) 4 mg tablet Take 2 tablets (8mg) by mouth the day before, the day off, and the day after chemotherapy. 30 Tab 1    ONETOUCH ULTRA TEST strip TEST 3 TIMES A  Strip 11    acetaminophen (TYLENOL) 325 mg tablet Take 650 mg by mouth every four (4) hours as needed for Pain.  VICTOZA 2-HUGO 0.6 mg/0.1 mL (18 mg/3 mL) sub-q pen INJECT 0.3 ML(1.8MG) UNDER THE SKIN EVERY DAY 6 mL 11    dicyclomine (BENTYL) 10 mg capsule Take 2 Caps by mouth four (4) times daily. (Patient taking differently: Take 20 mg by mouth four (4) times daily. Patient stated she usually takes 10mg twice daily) 120 Cap 11    NOVOLOG MIX 70-30 FLEXPEN 100 unit/mL (70-30) inpn USE 15 UNITS BEFORE BREAKFAST AND DINNER 15 mL 11    KELLY PEN NEEDLE 32 gauge x 5/32\" ndle USE AS DIRECTED TWICE DAILY 100 Pen Needle 11    FINACEA 15 % topical gel   5    tretinoin 0.025 % tpical gel   3    albuterol (PROVENTIL HFA, VENTOLIN HFA, PROAIR HFA) 90 mcg/actuation inhaler Take 2 Puffs by inhalation every four (4) hours as needed for Wheezing. 1 Inhaler 11    magic mouthwash solution Magic mouth wash   Maalox  Lidocaine 2% viscous   Diphenhydramine oral solution     Pharmacy to mix equal portions of ingredients to a total volume as indicated in the dispense amount. Swish and spit four times daily as needed for mouth sores. 240 mL 1    naproxen (NAPROSYN) 500 mg tablet TAKE 1 TABLET BY MOUTH TWICE DAILY WITH MEALS 60 Tab 0    diphenoxylate-atropine (LOMOTIL) 2.5-0.025 mg per tablet Take 1 Tab by mouth four (4) times daily as needed for Diarrhea. Max Daily Amount: 4 Tabs.  30 Tab 1    ondansetron (ZOFRAN ODT) 8 mg disintegrating tablet Take 1 Tab by mouth every eight (8) hours as needed for Nausea. 30 Tab 2    prochlorperazine (COMPAZINE) 10 mg tablet Take 1 Tab by mouth every six (6) hours as needed. Indications: CANCER CHEMOTHERAPY-INDUCED NAUSEA AND VOMITING 30 Tab 2    HYDROcodone-acetaminophen (NORCO) 7.5-325 mg per tablet Take 1 Tab by mouth every four (4) hours as needed for Pain. Max Daily Amount: 6 Tabs. 35 Tab 0    albuterol (PROVENTIL VENTOLIN) 2.5 mg /3 mL (0.083 %) nebulizer solution       CRANIAL PROSTHESIS misc Wig for chemo/ breast cancer 2 Each 1    hydroquinone 2 % topical cream Apply  to affected area two (2) times a day. Facility-Administered Medications Ordered in Other Visits   Medication Dose Route Frequency Provider Last Rate Last Dose    sodium chloride (NS) flush 5-10 mL  5-10 mL IntraVENous PRN Metta Massa, DO   10 mL at 01/31/17 1031    sodium chloride 0.9 % injection 10 mL  10 mL IntraVENous PRN Metta Massa, DO   10 mL at 01/31/17 0827    heparin (porcine) pf 500 Units  500 Units IntraVENous PRN Metta Massa, DO        [START ON 2/2/2017] pegfilgrastim (NEULASTA) injection 6 mg  6 mg SubCUTAneous ONCE Metta Massa, DO        DOCEtaxel (TAXOTERE) 130 mg in 0.9% sodium chloride 250 mL, overfill volume 25 mL chemo infusion  130 mg IntraVENous ONCE Metta Massa, DO   130 mg at 01/31/17 1328    dexamethasone (DECADRON) 4 mg/mL injection 12 mg  12 mg IntraVENous ONCE PRN Metta Massa, DO   Stopped at 01/31/17 1034    0.9% sodium chloride infusion  25 mL/hr IntraVENous CONTINUOUS Metta Massa, DO 25 mL/hr at 01/31/17 0941 25 mL/hr at 01/31/17 0941       Allergies   Allergen Reactions    Codeine Diarrhea    Pravastatin Myalgia     Terrible leg aching.      Theophyl Diarrhea and Nausea and Vomiting    Theophylline Diarrhea       Review of Systems    A comprehensive review of systems was negative except for: per HPI     Objective:  Visit Vitals    /84    Pulse 84    Temp 99.2 °F (37.3 °C)    Resp 16    Ht 5' 1\" (1.549 m)    Wt 246 lb (111.6 kg)    SpO2 99%    BMI 46.48 kg/m2       Physical Exam:   General appearance - alert, well appearing, and in no distress, oriented to person, place, and time and overweight  Mental status - alert, oriented to person, place, and time, normal mood, behavior, speech, dress, motor activity, and thought processes  EYE-conj clear  Mouth - mucous membranes pink, moist, intact. No lesions or thrush. Neck - supple  Chest - clear to auscultation bilaterally  Heart - normal rate and regular rhythm   Abdomen - round, soft, nontender  Ext-no pedal edema noted  Skin-Warm and dry. Intact without rash. Neuro -alert, oriented, normal speech, no focal findings or movement disorder noted    Diagnostic Imaging   reviewed  [facility] Results (most recent):    Results from Hospital Encounter encounter on 10/13/16   [facility] BROWN DEER NDL/WIRE/CLIP/SEED BREAST LT   Narrative Clinical History:  25-year-old female with left breast cancer, presenting for  needle localization prior to surgical excision . Comparison and correlation has been performed with recent MRI as well as  mammography, both from August, 2016. Procedure:  Bracketing wire mammographic guided wire localization utilizing 2  wires    Technical Description:  The risks, benefits, and alternatives of needle  localization were discussed with the patient who gave verbal and written  consent. The skin was prepped with chloraprep. Local lidocaine was used for anesthesia. A needle was placed in each abnormal area. Positioning was confirmed with  additional imaging. A [place] wire was then placed within each needle and each  needle was removed. The patient tolerated the procedure well with no immediate  complications. Explanation of positioning of the 2 wires:  Using mammographic guidance, the mass in the 9 o'clock position of the posterior  third of the left breast was localized.   Of note, the mass is approximately 15  to 20 mm inferior to the biopsy clip, and was localized rather than the biopsy  clip. This represents the posterior wire. Next, microcalcifications were  localized anterior to the malignancy, in the approximate location of MR  abnormality. This serves as the anterior wire. Post procedure digital mammogram shows the malignancy and approximate anterior  extension localized with bracketing wires from a medial approach. Impression Impression:     Status post bracketing wire localization utilizing 2 wires. Specimen radiograph  is recommended. Lab Results  reviewed  Lab Results   Component Value Date/Time    WBC 17.0 01/31/2017 08:16 AM    HGB 9.9 01/31/2017 08:16 AM    HCT 30.9 01/31/2017 08:16 AM    PLATELET 126 62/83/9655 08:16 AM    MCV 88.0 01/31/2017 08:16 AM       Lab Results   Component Value Date/Time    Sodium 139 01/31/2017 08:25 AM    Potassium 3.7 01/31/2017 08:25 AM    Chloride 105 01/31/2017 08:25 AM    CO2 26 01/31/2017 08:25 AM    Anion gap 8 01/31/2017 08:25 AM    Glucose 230 01/31/2017 08:25 AM    BUN 17 01/31/2017 08:25 AM    Creatinine 0.72 01/31/2017 08:25 AM    BUN/Creatinine ratio 24 01/31/2017 08:25 AM    GFR est AA >60 01/31/2017 08:25 AM    GFR est non-AA >60 01/31/2017 08:25 AM    Calcium 8.9 01/31/2017 08:25 AM    ALT 22 01/31/2017 08:25 AM    AST 10 01/31/2017 08:25 AM    Alk. phosphatase 74 01/31/2017 08:25 AM    Protein, total 6.7 01/31/2017 08:25 AM    Albumin 3.4 01/31/2017 08:25 AM    Globulin 3.3 01/31/2017 08:25 AM    A-G Ratio 1.0 01/31/2017 08:25 AM       Assessment/Plan:     Susi Lind is a  51 y.o. female and presents with Breast Cancer    CC  Triple neg left breast cancer 8/16    HPI  Pt seen today for office fu for triple neg left breast cancer. DX   Encounter Diagnoses   Name Primary?     Triple negative malignant neoplasm of breast ([facility] Yes    S/P lumpectomy, left breast     Stage 2 carcinoma of breast, ER-, left ([facility]     Morbid obesity due to excess calories ([facility] [facility]     Type 2 diabetes mellitus without complication, unspecified long term insulin use status ([facility] [facility]     Irritable bowel syndrome without diarrhea     Essential hypertension       STAGE: T2N0 triple neg    TREATMENT COURSE: lumpectomy 101/6    1. Stage 2 triple neg breast cancer s/p lumpectomy/ b/l breast reduction/. She is here for cycle 4 adjuvant chemotherapy with TAC today. Reviewed chemo plan overall today. CBC and CMP reviewed and are stable. VS are stable. Will proceed with chemotherapy as ordered today pending lab results. 2. Constipation/hemorrhoids. Improved with Miralax. Hemorrhoids improved as well. Continue Miralax as needed. 3. HTN/ asthma/ DM. Per PCP. She monitors her blood glucose at home. Encouraged to check her blood glucose if she has another episode of feeling lightheaded/sweaty/nauseated. 4. Cough. Mostly resolved. Chest xray was negative. Resolving from prior upper respiratory infection. Seen in conjunction with Taina Quarles NP. Follow-up in 3 weeks with next chemotherapy. ICD-10-CM ICD-9-CM    1. Stage 2 carcinoma of breast, ER-, left (HCC) C50.912 174.9     Z17.1 V86.1    2. S/P lumpectomy, left breast Z90.12 V45.89    3. Chemotherapy follow-up examination Z09 V67.2    4. Constipation, unspecified constipation type K59.00 564.00        Current Outpatient Prescriptions   Medication Sig    buPROPion SR (WELLBUTRIN SR) 150 mg SR tablet TAKE 1 TABLET BY MOUTH TWICE DAILY    ONE TOUCH DELICA 33 gauge misc TEST THREE TIMES DAILY    lidocaine-prilocaine (EMLA) topical cream Apply  to affected area as needed for Pain. Apply 30 to 60 minutes prior to port access.  dexamethasone (DECADRON) 4 mg tablet Take 2 tablets (8mg) by mouth the day before, the day off, and the day after chemotherapy.     ONETOUCH ULTRA TEST strip TEST 3 TIMES A DAY    acetaminophen (TYLENOL) 325 mg tablet Take 650 mg by mouth every four (4) hours as needed for Pain.  VICTOZA 2-HUGO 0.6 mg/0.1 mL (18 mg/3 mL) sub-q pen INJECT 0.3 ML(1.8MG) UNDER THE SKIN EVERY DAY    dicyclomine (BENTYL) 10 mg capsule Take 2 Caps by mouth four (4) times daily. (Patient taking differently: Take 20 mg by mouth four (4) times daily. Patient stated she usually takes 10mg twice daily)    NOVOLOG MIX 70-30 FLEXPEN 100 unit/mL (70-30) inpn USE 15 UNITS BEFORE BREAKFAST AND DINNER    KELLY PEN NEEDLE 32 gauge x 5/32\" ndle USE AS DIRECTED TWICE DAILY    FINACEA 15 % topical gel     tretinoin 0.025 % tpical gel     albuterol (PROVENTIL HFA, VENTOLIN HFA, PROAIR HFA) 90 mcg/actuation inhaler Take 2 Puffs by inhalation every four (4) hours as needed for Wheezing.  magic mouthwash solution Magic mouth wash   Maalox  Lidocaine 2% viscous   Diphenhydramine oral solution     Pharmacy to mix equal portions of ingredients to a total volume as indicated in the dispense amount. Swish and spit four times daily as needed for mouth sores.  naproxen (NAPROSYN) 500 mg tablet TAKE 1 TABLET BY MOUTH TWICE DAILY WITH MEALS    diphenoxylate-atropine (LOMOTIL) 2.5-0.025 mg per tablet Take 1 Tab by mouth four (4) times daily as needed for Diarrhea. Max Daily Amount: 4 Tabs.  ondansetron (ZOFRAN ODT) 8 mg disintegrating tablet Take 1 Tab by mouth every eight (8) hours as needed for Nausea.  prochlorperazine (COMPAZINE) 10 mg tablet Take 1 Tab by mouth every six (6) hours as needed. Indications: CANCER CHEMOTHERAPY-INDUCED NAUSEA AND VOMITING    HYDROcodone-acetaminophen (NORCO) 7.5-325 mg per tablet Take 1 Tab by mouth every four (4) hours as needed for Pain. Max Daily Amount: 6 Tabs.  albuterol (PROVENTIL VENTOLIN) 2.5 mg /3 mL (0.083 %) nebulizer solution     CRANIAL PROSTHESIS misc Wig for chemo/ breast cancer    hydroquinone 2 % topical cream Apply  to affected area two (2) times a day. No current facility-administered medications for this visit. Facility-Administered Medications Ordered in Other Visits   Medication Dose Route Frequency    sodium chloride (NS) flush 5-10 mL  5-10 mL IntraVENous PRN    sodium chloride 0.9 % injection 10 mL  10 mL IntraVENous PRN    heparin (porcine) pf 500 Units  500 Units IntraVENous PRN    [START ON 2/2/2017] pegfilgrastim (NEULASTA) injection 6 mg  6 mg SubCUTAneous ONCE    DOCEtaxel (TAXOTERE) 130 mg in 0.9% sodium chloride 250 mL, overfill volume 25 mL chemo infusion  130 mg IntraVENous ONCE    dexamethasone (DECADRON) 4 mg/mL injection 12 mg  12 mg IntraVENous ONCE PRN    0.9% sodium chloride infusion  25 mL/hr IntraVENous CONTINUOUS       continue present plan, call if any problems  There are no Patient Instructions on file for this visit. Follow-up Disposition:  Return in about 3 weeks (around 2/21/2017).     Shad Ni NP

## 2017-01-31 NOTE — PROGRESS NOTES
0805 Pt admit to [facility] for C4 Doxorubicin, Cyclophosphamide, and Docetaxel  ambulatory in stable condition. Assessment completed. No new concerns voiced. Port accessed  with positive blood return, labs drawn and sent for processing , flushed curos cap applied to end clave  0830 patient to MD office  1855 patient returned to [facility], port flushed no blood return, flush with no resistance. Normal Saline started at [facility]. 1027 spoke with Mark Kelly NP, patient port has blood return when patient takes a deep breath, port flushes with no resistance and no pain, ok to use per Anais. Pre Medications given, Patient stated she took her dexamethasone prior to arriving to [facility] Medications ordered. Medication ordered. Visit Vitals    /84    Pulse 93    Temp 98.1 °F (36.7 °C)    Resp 16    Wt 111.8 kg (246 lb 8 oz)    SpO2 94%    Breastfeeding No    BMI 46.58 kg/m2       Medications:  Normal Saline Flush  Normal Saline  Zofran  Doxorubicin  Cyclophosphamide  Docetaxel  Heparin Flush    1440 Pt tolerated treatment well. Port maintained positive blood return throughout treatment, flushed with positive blood return at conclusion, Port heparinized and de accessed. D/c home ambulatory in no distress. Pt aware of next appointment scheduled for 2/2/17    Recent Results (from the past 12 hour(s))   CBC WITH 3 PART DIFF    Collection Time: 01/31/17  8:16 AM   Result Value Ref Range    WBC 17.0 (H) 3.6 - 11.0 K/uL    RBC 3.51 (L) 3.80 - 5.20 M/uL    HGB 9.9 (L) 11.5 - 16.0 g/dL    HCT 30.9 (L) 35.0 - 47.0 %    MCV 88.0 80.0 - 99.0 FL    MCH 28.2 26.0 - 34.0 PG    MCHC 32.0 30.0 - 36.5 g/dL    RDW 16.7 (H) 11.8 - 15.8 %    PLATELET 465 (H) 439 - 400 K/uL    NEUTROPHILS 87 (H) 32 - 75 %    MIXED CELLS 7 3.2 - 16.9 %    LYMPHOCYTES 6 (L) 12 - 49 %    ABS. NEUTROPHILS 14.7 (H) 1.8 - 8.0 K/UL    ABS. MIXED CELLS 1.2 0.2 - 1.2 K/uL    ABS.  LYMPHOCYTES 1.1 0.8 - 3.5 K/UL    DF AUTOMATED     METABOLIC PANEL, COMPREHENSIVE Collection Time: 01/31/17  8:25 AM   Result Value Ref Range    Sodium 139 136 - 145 mmol/L    Potassium 3.7 3.5 - 5.1 mmol/L    Chloride 105 97 - 108 mmol/L    CO2 26 21 - 32 mmol/L    Anion gap 8 5 - 15 mmol/L    Glucose 230 (H) 65 - 100 mg/dL    BUN 17 6 - 20 MG/DL    Creatinine 0.72 0.55 - 1.02 MG/DL    BUN/Creatinine ratio 24 (H) 12 - 20      GFR est AA >60 >60 ml/min/1.73m2    GFR est non-AA >60 >60 ml/min/1.73m2    Calcium 8.9 8.5 - 10.1 MG/DL    Bilirubin, total 0.3 0.2 - 1.0 MG/DL    ALT 22 12 - 78 U/L    AST 10 (L) 15 - 37 U/L    Alk.  phosphatase 74 45 - 117 U/L    Protein, total 6.7 6.4 - 8.2 g/dL    Albumin 3.4 (L) 3.5 - 5.0 g/dL    Globulin 3.3 2.0 - 4.0 g/dL    A-G Ratio 1.0 (L) 1.1 - 2.2

## 2017-01-31 NOTE — PROGRESS NOTES
Rja Lovell is a 51 y.o. female here today for follow up of breast cancer. States neuropathy to fingers of both hands is getting worse. She feels some of that comes from being diabetic.

## 2017-02-02 ENCOUNTER — HOSPITAL ENCOUNTER (OUTPATIENT)
Dept: INFUSION THERAPY | Age: 47
Discharge: HOME | End: 2017-02-02
Payer: COMMERCIAL

## 2017-02-02 VITALS
TEMPERATURE: 97.1 F | DIASTOLIC BLOOD PRESSURE: 76 MMHG | SYSTOLIC BLOOD PRESSURE: 142 MMHG | HEART RATE: 81 BPM | RESPIRATION RATE: 16 BRPM | OXYGEN SATURATION: 99 %

## 2017-02-02 PROCEDURE — 74011250636 HC RX REV CODE- 250/636: Performed by: INTERNAL MEDICINE

## 2017-02-02 PROCEDURE — 96401 CHEMO ANTI-NEOPL SQ/IM: CPT

## 2017-02-02 RX ADMIN — PEGFILGRASTIM 6 MG: 6 INJECTION SUBCUTANEOUS at 08:38

## 2017-02-02 NOTE — PROGRESS NOTES
Outpatient Infusion Center Short Visit Progress Note     0830 Pt admit to [facility] for Neulasta ambulatory in stable condition. Assessment completed. No new concerns voiced.      Patient Vitals for the past 12 hrs:   Temp Pulse Resp BP SpO2   02/02/17 0830 97.1 °F (36.2 °C) 81 16 142/76 99 %        Medications:  Neulasta SQ right arm      0849 Pt tolerated treatment well. D/c home ambulatory in no distress. Pt aware of next appointment scheduled for 2/21/17  .

## 2017-02-02 NOTE — PROGRESS NOTES
Problem: Patient Education: Go to Education Activity  Goal: Patient/Family Education  Outcome: Progressing Towards Goal  neulasta

## 2017-02-17 RX ORDER — DOXORUBICIN HYDROCHLORIDE 2 MG/ML
54 INJECTION, SOLUTION INTRAVENOUS ONCE
Status: DISPENSED | OUTPATIENT
Start: 2017-02-21 | End: 2017-02-21

## 2017-02-17 RX ORDER — ONDANSETRON 2 MG/ML
8 INJECTION INTRAMUSCULAR; INTRAVENOUS ONCE
Status: ACTIVE | OUTPATIENT
Start: 2017-02-21 | End: 2017-02-21

## 2017-02-17 RX ORDER — DEXAMETHASONE SODIUM PHOSPHATE 4 MG/ML
12 INJECTION, SOLUTION INTRA-ARTICULAR; INTRALESIONAL; INTRAMUSCULAR; INTRAVENOUS; SOFT TISSUE
Status: ACTIVE | OUTPATIENT
Start: 2017-02-21 | End: 2017-02-22

## 2017-02-17 RX ORDER — DOXORUBICIN HYDROCHLORIDE 2 MG/ML
56 INJECTION, SOLUTION INTRAVENOUS ONCE
Status: DISPENSED | OUTPATIENT
Start: 2017-02-21 | End: 2017-02-21

## 2017-02-21 ENCOUNTER — OFFICE VISIT (OUTPATIENT)
Dept: ONCOLOGY | Age: 47
End: 2017-02-21

## 2017-02-21 ENCOUNTER — HOSPITAL ENCOUNTER (OUTPATIENT)
Dept: INFUSION THERAPY | Age: 47
Discharge: HOME | End: 2017-02-21
Payer: COMMERCIAL

## 2017-02-21 VITALS
HEIGHT: 61 IN | SYSTOLIC BLOOD PRESSURE: 145 MMHG | TEMPERATURE: 98.6 F | RESPIRATION RATE: 16 BRPM | DIASTOLIC BLOOD PRESSURE: 82 MMHG | HEART RATE: 100 BPM | WEIGHT: 242 LBS | BODY MASS INDEX: 45.69 KG/M2 | OXYGEN SATURATION: 98 %

## 2017-02-21 VITALS
TEMPERATURE: 98.7 F | WEIGHT: 242 LBS | RESPIRATION RATE: 16 BRPM | HEIGHT: 61 IN | DIASTOLIC BLOOD PRESSURE: 76 MMHG | HEART RATE: 99 BPM | BODY MASS INDEX: 45.69 KG/M2 | SYSTOLIC BLOOD PRESSURE: 130 MMHG

## 2017-02-21 DIAGNOSIS — G62.0 CHEMOTHERAPY-INDUCED NEUROPATHY (HCC): ICD-10-CM

## 2017-02-21 DIAGNOSIS — I10 ESSENTIAL HYPERTENSION: ICD-10-CM

## 2017-02-21 DIAGNOSIS — Z09 CHEMOTHERAPY FOLLOW-UP EXAMINATION: ICD-10-CM

## 2017-02-21 DIAGNOSIS — Z98.890 S/P LUMPECTOMY, LEFT BREAST: ICD-10-CM

## 2017-02-21 DIAGNOSIS — C50.912 STAGE 2 CARCINOMA OF BREAST, ER-, LEFT (HCC): Primary | ICD-10-CM

## 2017-02-21 DIAGNOSIS — T45.1X5A CHEMOTHERAPY-INDUCED NEUROPATHY (HCC): ICD-10-CM

## 2017-02-21 DIAGNOSIS — E11.9 TYPE 2 DIABETES MELLITUS WITHOUT COMPLICATION, UNSPECIFIED LONG TERM INSULIN USE STATUS: ICD-10-CM

## 2017-02-21 DIAGNOSIS — Z95.828 PORT CATHETER IN PLACE: ICD-10-CM

## 2017-02-21 DIAGNOSIS — Z17.1 STAGE 2 CARCINOMA OF BREAST, ER-, LEFT (HCC): Primary | ICD-10-CM

## 2017-02-21 LAB
ALBUMIN SERPL BCP-MCNC: 3.1 G/DL (ref 3.5–5)
ALBUMIN/GLOB SERPL: 1.1 {RATIO} (ref 1.1–2.2)
ALP SERPL-CCNC: 75 U/L (ref 45–117)
ALT SERPL-CCNC: 18 U/L (ref 12–78)
ANION GAP BLD CALC-SCNC: 13 MMOL/L (ref 5–15)
AST SERPL W P-5'-P-CCNC: 9 U/L (ref 15–37)
BASOPHILS # BLD AUTO: 0 K/UL (ref 0–0.1)
BASOPHILS # BLD: 0 % (ref 0–1)
BILIRUB SERPL-MCNC: 0.2 MG/DL (ref 0.2–1)
BUN SERPL-MCNC: 10 MG/DL (ref 6–20)
BUN/CREAT SERPL: 16 (ref 12–20)
CALCIUM SERPL-MCNC: 8.9 MG/DL (ref 8.5–10.1)
CHLORIDE SERPL-SCNC: 105 MMOL/L (ref 97–108)
CO2 SERPL-SCNC: 22 MMOL/L (ref 21–32)
CREAT SERPL-MCNC: 0.61 MG/DL (ref 0.55–1.02)
DIFFERENTIAL METHOD BLD: ABNORMAL
EOSINOPHIL # BLD: 0 K/UL (ref 0–0.4)
EOSINOPHIL NFR BLD: 0 % (ref 0–7)
ERYTHROCYTE [DISTWIDTH] IN BLOOD BY AUTOMATED COUNT: 18.1 % (ref 11.5–14.5)
GLOBULIN SER CALC-MCNC: 2.8 G/DL (ref 2–4)
GLUCOSE SERPL-MCNC: 283 MG/DL (ref 65–100)
HCT VFR BLD AUTO: 28 % (ref 35–47)
HGB BLD-MCNC: 9 G/DL (ref 11.5–16)
LYMPHOCYTES # BLD AUTO: 8 % (ref 12–49)
LYMPHOCYTES # BLD: 1.3 K/UL (ref 0.8–3.5)
MCH RBC QN AUTO: 29.1 PG (ref 26–34)
MCHC RBC AUTO-ENTMCNC: 32.1 G/DL (ref 30–36.5)
MCV RBC AUTO: 90.6 FL (ref 80–99)
MONOCYTES # BLD: 1.2 K/UL (ref 0–1)
MONOCYTES NFR BLD AUTO: 7 % (ref 5–13)
NEUTS BAND NFR BLD MANUAL: 1 % (ref 0–6)
NEUTS SEG # BLD: 14.2 K/UL (ref 1.8–8)
NEUTS SEG NFR BLD AUTO: 84 % (ref 32–75)
PLATELET # BLD AUTO: 344 K/UL (ref 150–400)
PLATELET COMMENTS,PCOM: ABNORMAL
POTASSIUM SERPL-SCNC: 4.2 MMOL/L (ref 3.5–5.1)
PROT SERPL-MCNC: 5.9 G/DL (ref 6.4–8.2)
RBC # BLD AUTO: 3.09 M/UL (ref 3.8–5.2)
RBC MORPH BLD: ABNORMAL
SODIUM SERPL-SCNC: 140 MMOL/L (ref 136–145)
WBC # BLD AUTO: 16.7 K/UL (ref 3.6–11)

## 2017-02-21 RX ORDER — SODIUM CHLORIDE 0.9 % (FLUSH) 0.9 %
10 SYRINGE (ML) INJECTION AS NEEDED
Status: ACTIVE | OUTPATIENT
Start: 2017-02-21 | End: 2017-02-22

## 2017-02-21 RX ORDER — SODIUM CHLORIDE 9 MG/ML
10 INJECTION INTRAMUSCULAR; INTRAVENOUS; SUBCUTANEOUS AS NEEDED
Status: ACTIVE | OUTPATIENT
Start: 2017-02-21 | End: 2017-02-22

## 2017-02-21 RX ORDER — HEPARIN 100 UNIT/ML
500 SYRINGE INTRAVENOUS AS NEEDED
Status: ACTIVE | OUTPATIENT
Start: 2017-02-21 | End: 2017-02-22

## 2017-02-21 RX ADMIN — SODIUM CHLORIDE 10 ML: 9 INJECTION, SOLUTION INTRAMUSCULAR; INTRAVENOUS; SUBCUTANEOUS at 08:18

## 2017-02-21 RX ADMIN — Medication 10 ML: at 09:42

## 2017-02-21 RX ADMIN — Medication 500 UNITS: at 09:42

## 2017-02-21 RX ADMIN — Medication 10 ML: at 08:18

## 2017-02-21 NOTE — PROGRESS NOTES
HEME/ONC PROGRESS NOTE       Raul Chaves is a 51 y.o. 1970 female and presents with Breast Cancer    CC  Triple neg left breast cancer 8/16    HPI:  Pt seen today for office f/u for breast cancer  She is post lump/ reduction surgery. Patient is here for cycle 5 adjuvant chemotherapy with TAC today. She feels her vision is blurry which seems to be getting worse. She denies headaches. Ms. Mary Adams does have numbness/tingling to her fingertips that she does not feel has worsened over the past 3 weeks. She is now, however, noticing pain to her finger tips and nails that she notices mostly with typing. She frequently types at work. Ms. Mary Adams denies fevers, chills, shortness of breath. She has experienced occasional sharp pain to her mid-chest/left breast area that seems to radiate to her back that she feels is related to gas pains. She denies nausea, vomiting, or diarrhea. Initial visit:  consult for new triple neg left breast cancer. Abnormal mammo 6/16. Biopsy 8/16 triple neg IDC. MRI breast 8mm mass with another area of DCIS. Lumpectomy/ reuction 10/16 shows 3 cm mass with neg LN 0/4. Pt has hx of DM/ asthma/ IBS/ medical noncompliance at PCP. Pt is here to discuss adjuvant therapy. 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 Here with son. Brian by surgery. DX   Encounter Diagnosis   Name Primary?     Stage 2 carcinoma of breast, ER-, left (HCC) Yes        STAGE: T2N0 triple neg    TREATMENT COURSE: lumpectomy 10/16 with b/l reduction, Adjuvant chemotherapy with TAC x 4, stopped due to peripheral neuropathy    Past Medical History:   Diagnosis Date    Arthritis     Asthma     Cancer ([address]     BREAST left    Chronic pain     Diabetes ([facility]     Dyslipidemia     IBS (irritable bowel syndrome)     Nausea & vomiting     Noncompliance     Obesity     Psychiatric disorder     S/P breast biopsy, left 8/1/16    Sinusitis     Stress at home      Past Surgical History:   Procedure Laterality Date  HX BREAST LUMPECTOMY Left 10/13/2016    LEFT BREAST REDUCTION LUMPECTOMY, LEFT SENTINEL NODE BIOPSY, LEFT BRACKETED NEEDLE LOCALIZATION, LEFT BREAST RECONSTRUCTION performed by Josie Sue MD at [address] [place] [address] HX BREAST RECONSTRUCTION Bilateral 10/13/2016    BREAST REDUCTION performed by Deondre Bugrer MD at [facility] AMBULATORY OR    HX  SECTION      X2    HX GYN      ABLATION    HX HERNIA REPAIR      AS INFANT    HX HYSTERECTOMY      full    HX ORTHOPAEDIC      L THUMB     Social History     Social History    Marital status: SINGLE     Spouse name: N/A    Number of children: N/A    Years of education: N/A     Social History Main Topics    Smoking status: Never Smoker    Smokeless tobacco: Never Used    Alcohol use No    Drug use: No    Sexual activity: Not Asked     Other Topics Concern    None     Social History Narrative    ** Merged History Encounter **          Family History   Problem Relation Age of Onset    Heart Disease Mother     Hypertension Mother     Heart Disease Father     Seizures Sister     Anesth Problems Neg Hx        Current Outpatient Prescriptions   Medication Sig Dispense Refill    naproxen (NAPROSYN) 500 mg tablet TAKE 1 TABLET BY MOUTH TWICE DAILY WITH MEALS 60 Tab 0    buPROPion SR (WELLBUTRIN SR) 150 mg SR tablet TAKE 1 TABLET BY MOUTH TWICE DAILY 30 Tab 3    ONE TOUCH DELICA 33 gauge misc TEST THREE TIMES DAILY 100 Lancet 11    lidocaine-prilocaine (EMLA) topical cream Apply  to affected area as needed for Pain. Apply 30 to 60 minutes prior to port access. 30 g 0    dexamethasone (DECADRON) 4 mg tablet Take 2 tablets (8mg) by mouth the day before, the day off, and the day after chemotherapy. 30 Tab 1    ondansetron (ZOFRAN ODT) 8 mg disintegrating tablet Take 1 Tab by mouth every eight (8) hours as needed for Nausea.  30 Tab 2    ONETOUCH ULTRA TEST strip TEST 3 TIMES A  Strip 11    VICTOZA 2-HUGO 0.6 mg/0.1 mL (18 mg/3 mL) sub-q pen INJECT 0.3 ML(1.8MG) UNDER THE SKIN EVERY DAY 6 mL 11    dicyclomine (BENTYL) 10 mg capsule Take 2 Caps by mouth four (4) times daily. (Patient taking differently: Take 20 mg by mouth four (4) times daily. Patient stated she usually takes 10mg twice daily) 120 Cap 11    NOVOLOG MIX 70-30 FLEXPEN 100 unit/mL (70-30) inpn USE 15 UNITS BEFORE BREAKFAST AND DINNER 15 mL 11    KELLY PEN NEEDLE 32 gauge x 5/32\" ndle USE AS DIRECTED TWICE DAILY 100 Pen Needle 11    albuterol (PROVENTIL HFA, VENTOLIN HFA, PROAIR HFA) 90 mcg/actuation inhaler Take 2 Puffs by inhalation every four (4) hours as needed for Wheezing. 1 Inhaler 11    magic mouthwash solution Magic mouth wash   Maalox  Lidocaine 2% viscous   Diphenhydramine oral solution     Pharmacy to mix equal portions of ingredients to a total volume as indicated in the dispense amount. Swish and spit four times daily as needed for mouth sores. 240 mL 1    diphenoxylate-atropine (LOMOTIL) 2.5-0.025 mg per tablet Take 1 Tab by mouth four (4) times daily as needed for Diarrhea. Max Daily Amount: 4 Tabs. 30 Tab 1    prochlorperazine (COMPAZINE) 10 mg tablet Take 1 Tab by mouth every six (6) hours as needed. Indications: CANCER CHEMOTHERAPY-INDUCED NAUSEA AND VOMITING 30 Tab 2    HYDROcodone-acetaminophen (NORCO) 7.5-325 mg per tablet Take 1 Tab by mouth every four (4) hours as needed for Pain. Max Daily Amount: 6 Tabs. 35 Tab 0    albuterol (PROVENTIL VENTOLIN) 2.5 mg /3 mL (0.083 %) nebulizer solution       acetaminophen (TYLENOL) 325 mg tablet Take 650 mg by mouth every four (4) hours as needed for Pain.  hydroquinone 2 % topical cream Apply  to affected area two (2) times a day.       FINACEA 15 % topical gel   5    tretinoin 0.025 % tpical gel   3     Facility-Administered Medications Ordered in Other Visits   Medication Dose Route Frequency Provider Last Rate Last Dose    pegfilgrastim (NEULASTA) injection 6 mg  6 mg SubCUTAneous ONCE Leola June Sera Garza,            Allergies   Allergen Reactions    Codeine Diarrhea    Pravastatin Myalgia     Terrible leg aching.  Theophyl Diarrhea and Nausea and Vomiting    Theophylline Diarrhea       Review of Systems    A comprehensive review of systems was negative except for: per HPI     Objective:  Visit Vitals    /82    Pulse 100    Temp 98.6 °F (37 °C) (Oral)    Resp 16    Ht 5' 1\" (1.549 m)    Wt 242 lb (109.8 kg)    SpO2 98%    BMI 45.73 kg/m2       Physical Exam:   General appearance - alert, well appearing, and in no distress, oriented to person, place, and time and overweight  Mental status - alert, oriented to person, place, and time, normal mood, behavior, speech, dress, motor activity, and thought processes  EYE-conj clear  Mouth - mucous membranes pink, moist, intact. No lesions or thrush. Neck - supple  Chest - clear to auscultation bilaterally  Heart - normal rate and regular rhythm   Abdomen - round, soft, nontender  Ext-no pedal edema noted bilaterally  Skin-Warm and dry. Intact without rash. Neuro -alert, oriented, normal speech, no focal findings or movement disorder noted    Diagnostic Imaging   reviewed  [facility] Results (most recent):    Results from Hospital Encounter encounter on 10/13/16   [facility] BROWN DEER NDL/WIRE/CLIP/SEED BREAST LT   Narrative Clinical History:  58-year-old female with left breast cancer, presenting for  needle localization prior to surgical excision . Comparison and correlation has been performed with recent MRI as well as  mammography, both from August, 2016. Procedure:  Bracketing wire mammographic guided wire localization utilizing 2  wires    Technical Description:  The risks, benefits, and alternatives of needle  localization were discussed with the patient who gave verbal and written  consent. The skin was prepped with chloraprep. Local lidocaine was used for anesthesia. A needle was placed in each abnormal area.   Positioning was confirmed with  additional imaging. A Ireland wire was then placed within each needle and each  needle was removed. The patient tolerated the procedure well with no immediate  complications. Explanation of positioning of the 2 wires:  Using mammographic guidance, the mass in the 9 o'clock position of the posterior  third of the left breast was localized. Of note, the mass is approximately 15  to 20 mm inferior to the biopsy clip, and was localized rather than the biopsy  clip. This represents the posterior wire. Next, microcalcifications were  localized anterior to the malignancy, in the approximate location of MR  abnormality. This serves as the anterior wire. Post procedure digital mammogram shows the malignancy and approximate anterior  extension localized with bracketing wires from a medial approach. Impression Impression:     Status post bracketing wire localization utilizing 2 wires. Specimen radiograph  is recommended. Lab Results  reviewed  Lab Results   Component Value Date/Time    WBC 16.7 02/21/2017 08:24 AM    HGB 9.0 02/21/2017 08:24 AM    HCT 28.0 02/21/2017 08:24 AM    PLATELET 286 58/02/4864 08:24 AM    MCV 90.6 02/21/2017 08:24 AM       Lab Results   Component Value Date/Time    Sodium 140 02/21/2017 08:24 AM    Potassium 4.2 02/21/2017 08:24 AM    Chloride 105 02/21/2017 08:24 AM    CO2 22 02/21/2017 08:24 AM    Anion gap 13 02/21/2017 08:24 AM    Glucose 283 02/21/2017 08:24 AM    BUN 10 02/21/2017 08:24 AM    Creatinine 0.61 02/21/2017 08:24 AM    BUN/Creatinine ratio 16 02/21/2017 08:24 AM    GFR est AA >60 02/21/2017 08:24 AM    GFR est non-AA >60 02/21/2017 08:24 AM    Calcium 8.9 02/21/2017 08:24 AM    AST (SGOT) 9 02/21/2017 08:24 AM    Alk.  phosphatase 75 02/21/2017 08:24 AM    Protein, total 5.9 02/21/2017 08:24 AM    Albumin 3.1 02/21/2017 08:24 AM    Globulin 2.8 02/21/2017 08:24 AM    A-G Ratio 1.1 02/21/2017 08:24 AM    ALT (SGPT) 18 02/21/2017 08:24 AM Assessment/Plan:     Marisol Freire is a  51 y.o. female and presents with Breast Cancer    CC  Triple neg left breast cancer 8/16    HPI  Pt seen today for office fu for triple neg left breast cancer. DX   Encounter Diagnoses   Name Primary?  Triple negative malignant neoplasm of breast ([facility] [address] Yes    S/P lumpectomy, left breast     Stage 2 carcinoma of breast, ER-, left ([facility]     Morbid obesity due to excess calories ([facility] [address]     Type 2 diabetes mellitus without complication, unspecified long term insulin use status ([facility] [address] [facility]     Irritable bowel syndrome without diarrhea     Essential hypertension       STAGE: T2N0 triple neg    TREATMENT COURSE: lumpectomy 101/6    1. Stage 2 triple neg breast cancer s/p lumpectomy/ b/l breast reduction/. She is here for cycle 5 adjuvant chemotherapy with TAC today. Peripheral neuropathy has worsened on chemotherapy. Taxotere was dose reduced by 20% with cycle 4 due to neuropathy. Patient has pain to her finger tips now. Reviewed overall chemo treatment plan today with pt. Discussed continued chemo vs stopping due to side effects overall. Will D/C further chemotherapy due to neuropathy. Reviewed with patient today and she is in agreement with plan. Labs and VS reviewed and are stable. Plan is for patient to proceed with radiation therapy now that she has completed chemotherapy. She would like to see a radiation oncologist at [facility], referral placed today. 2. HTN/ asthma/ DM. Per PCP. 3. Peripheral neuropathy. Grade 2. D/C further chemotherapy due to neuropathy. Patient has diabetes and experienced some neuropathy prior to chemotherapy but symptoms have worsened since starting chemo. Seen in conjunction with Lauro Vasquez NP. Follow-up in 1 month.       ICD-10-CM ICD-9-CM    1. Stage 2 carcinoma of breast, ER-, left (HCC) C50.912 174.9 REFERRAL TO RADIATION ONCOLOGY    Z17.1 V86.1        Current Outpatient Prescriptions   Medication Sig  naproxen (NAPROSYN) 500 mg tablet TAKE 1 TABLET BY MOUTH TWICE DAILY WITH MEALS    buPROPion SR (WELLBUTRIN SR) 150 mg SR tablet TAKE 1 TABLET BY MOUTH TWICE DAILY    ONE TOUCH DELICA 33 gauge misc TEST THREE TIMES DAILY    lidocaine-prilocaine (EMLA) topical cream Apply  to affected area as needed for Pain. Apply 30 to 60 minutes prior to port access.  dexamethasone (DECADRON) 4 mg tablet Take 2 tablets (8mg) by mouth the day before, the day off, and the day after chemotherapy.  ondansetron (ZOFRAN ODT) 8 mg disintegrating tablet Take 1 Tab by mouth every eight (8) hours as needed for Nausea.  ONETOUCH ULTRA TEST strip TEST 3 TIMES A DAY    VICTOZA 2-HUGO 0.6 mg/0.1 mL (18 mg/3 mL) sub-q pen INJECT 0.3 ML(1.8MG) UNDER THE SKIN EVERY DAY    dicyclomine (BENTYL) 10 mg capsule Take 2 Caps by mouth four (4) times daily. (Patient taking differently: Take 20 mg by mouth four (4) times daily. Patient stated she usually takes 10mg twice daily)    NOVOLOG MIX 70-30 FLEXPEN 100 unit/mL (70-30) inpn USE 15 UNITS BEFORE BREAKFAST AND DINNER    KELLY PEN NEEDLE 32 gauge x 5/32\" ndle USE AS DIRECTED TWICE DAILY    albuterol (PROVENTIL HFA, VENTOLIN HFA, PROAIR HFA) 90 mcg/actuation inhaler Take 2 Puffs by inhalation every four (4) hours as needed for Wheezing.  magic mouthwash solution Magic mouth wash   Maalox  Lidocaine 2% viscous   Diphenhydramine oral solution     Pharmacy to mix equal portions of ingredients to a total volume as indicated in the dispense amount. Swish and spit four times daily as needed for mouth sores.  diphenoxylate-atropine (LOMOTIL) 2.5-0.025 mg per tablet Take 1 Tab by mouth four (4) times daily as needed for Diarrhea. Max Daily Amount: 4 Tabs.  prochlorperazine (COMPAZINE) 10 mg tablet Take 1 Tab by mouth every six (6) hours as needed.  Indications: CANCER CHEMOTHERAPY-INDUCED NAUSEA AND VOMITING    HYDROcodone-acetaminophen (NORCO) 7.5-325 mg per tablet Take 1 Tab by mouth every four (4) hours as needed for Pain. Max Daily Amount: 6 Tabs.  albuterol (PROVENTIL VENTOLIN) 2.5 mg /3 mL (0.083 %) nebulizer solution     acetaminophen (TYLENOL) 325 mg tablet Take 650 mg by mouth every four (4) hours as needed for Pain.  hydroquinone 2 % topical cream Apply  to affected area two (2) times a day.  FINACEA 15 % topical gel     tretinoin 0.025 % tpical gel      No current facility-administered medications for this visit. Facility-Administered Medications Ordered in Other Visits   Medication Dose Route Frequency    pegfilgrastim (NEULASTA) injection 6 mg  6 mg SubCUTAneous ONCE       continue present plan, call if any problems  There are no Patient Instructions on file for this visit. Follow-up Disposition:  Return in about 4 weeks (around 3/21/2017).     Helyn Lanes, NP

## 2017-02-21 NOTE — PROGRESS NOTES
Outpatient Infusion Center - Chemotherapy Progress Note    0800 Pt admit to [facility] for cycle 5 TAC stopped due to neuropathies, (new orders to come) ambulatory in stable condition. Assessment completed. No new concerns voiced. Port with positive blood return. Labs drawn and sent. Pt to MD appt at 0830, returned to [facility] at [address] Chemotherapy Flowsheet 2/21/2017   Cycle C5   Date 2/21/2017   Drug / Regimen TAC   Notes Holding treatment due to neuropathies     Visit Vitals    /76 (BP 1 Location: Right arm, BP Patient Position: Sitting)    Pulse 99    Temp 98.7 °F (37.1 °C)    Resp 16    Ht 5' 1\" (1.549 m)    Wt 109.8 kg (242 lb)    Breastfeeding No    BMI 45.73 kg/m2       Medications:  Normal saline  Heparin flush    0945 Pt tolerated treatment well. Port maintained positive blood return throughout treatment. Flushed, heparinized and de-accessed per protocol. D/c home ambulatory in no distress. Cancelled appt for neulasta. Orders to come if there will be future appointments. Recent Results (from the past 12 hour(s))   CBC WITH AUTOMATED DIFF    Collection Time: 02/21/17  8:24 AM   Result Value Ref Range    WBC 16.7 (H) 3.6 - 11.0 K/uL    RBC 3.09 (L) 3.80 - 5.20 M/uL    HGB 9.0 (L) 11.5 - 16.0 g/dL    HCT 28.0 (L) 35.0 - 47.0 %    MCV 90.6 80.0 - 99.0 FL    MCH 29.1 26.0 - 34.0 PG    MCHC 32.1 30.0 - 36.5 g/dL    RDW 18.1 (H) 11.5 - 14.5 %    PLATELET 343 133 - 383 K/uL    NEUTROPHILS PENDING %    LYMPHOCYTES PENDING %    MONOCYTES PENDING %    EOSINOPHILS PENDING %    BASOPHILS PENDING %    ABS. NEUTROPHILS PENDING K/UL    ABS. LYMPHOCYTES PENDING K/UL    ABS. MONOCYTES PENDING K/UL    ABS. EOSINOPHILS PENDING K/UL    ABS.  BASOPHILS PENDING K/UL    DF PENDING    METABOLIC PANEL, COMPREHENSIVE    Collection Time: 02/21/17  8:24 AM   Result Value Ref Range    Sodium 140 136 - 145 mmol/L    Potassium 4.2 3.5 - 5.1 mmol/L    Chloride 105 97 - 108 mmol/L    CO2 22 21 - 32 mmol/L    Anion gap 13 5 - 15 mmol/L    Glucose 283 (H) 65 - 100 mg/dL    BUN 10 6 - 20 MG/DL    Creatinine 0.61 0.55 - 1.02 MG/DL    BUN/Creatinine ratio 16 12 - 20      GFR est AA >60 >60 ml/min/1.73m2    GFR est non-AA >60 >60 ml/min/1.73m2    Calcium 8.9 8.5 - 10.1 MG/DL    Bilirubin, total 0.2 0.2 - 1.0 MG/DL    ALT (SGPT) 18 12 - 78 U/L    AST (SGOT) 9 (L) 15 - 37 U/L    Alk.  phosphatase 75 45 - 117 U/L    Protein, total 5.9 (L) 6.4 - 8.2 g/dL    Albumin 3.1 (L) 3.5 - 5.0 g/dL    Globulin 2.8 2.0 - 4.0 g/dL    A-G Ratio 1.1 1.1 - 2.2

## 2017-02-21 NOTE — PROGRESS NOTES
Problem: Patient Education: Go to Education Activity  Goal: Patient/Family Education  Outcome: Progressing Towards Goal  Stopped treatment for neuropathies

## 2017-02-21 NOTE — MR AVS SNAPSHOT
Visit Information Date & Time Provider Department Dept. Phone Encounter #  
 2/21/2017  8:45 AM Rosella Snellen,  [facility] Oncology at [facility] [id number] Follow-up Instructions Return in about 4 weeks (around 3/21/2017). Your Appointments 3/14/2017  8:15 AM  
Follow Up with Rosella Snellen,  [facility] at [facility] Appt Note: f/u infusion day [address] [facility] [address] [id number]  
[phone]  
  
   
 [address] [address] Upcoming Health Maintenance Date Due Pneumococcal 19-64 Highest Risk (1 of 3 - PCV13) 6/8/1988 DTaP/Tdap/Td series (1 - Tdap) 6/8/1990 EYE EXAM RETINAL OR DILATED Q1 6/18/2016 FOOT EXAM Q1 7/7/2016 HEMOGLOBIN A1C Q6M 1/14/2017 MICROALBUMIN Q1 7/14/2017 LIPID PANEL Q1 7/14/2017 PAP AKA CERVICAL CYTOLOGY 8/6/2017 Allergies as of 2/21/2017  Review Complete On: 2/21/2017 By: Rosella Snellen, NP Severity Noted Reaction Type Reactions Codeine  02/21/2011    Diarrhea Pravastatin  08/26/2016    Myalgia Terrible leg aching. Theophyl  09/29/2014   Side Effect Diarrhea, Nausea and Vomiting Theophylline  02/21/2011    Diarrhea Current Immunizations  Reviewed on 2/21/2017 Name Date Influenza Vaccine 9/28/2016 Reviewed by Jinny Maher RN on 2/21/2017 at  7:57 AM  
 Reviewed by Cesar Germain LPN on 8/50/1025 at  8:39 AM  
Vitals BP Pulse Temp Resp Height(growth percentile) Weight(growth percentile) 145/82 100 98.6 °F (37 °C) (Oral) 16 5' 1\" (1.549 m) 242 lb (109.8 kg) SpO2 BMI OB Status Smoking Status 98% 45.73 kg/m2 Hysterectomy Never Smoker Vitals History BMI and BSA Data Body Mass Index Body Surface Area 45.73 kg/m 2 2.17 m 2 Preferred Pharmacy Pharmacy Name Phone  [address] [facility], [address] [facility] [address] [facility] [phone] Your Updated Medication List  
  
   
This list is accurate as of: 2/21/17  9:34 AM.  Always use your most recent med list.  
  
  
  
  
 acetaminophen 325 mg tablet Commonly known as:  TYLENOL Take 650 mg by mouth every four (4) hours as needed for Pain. * albuterol 90 mcg/actuation inhaler Commonly known as:  PROVENTIL HFA, VENTOLIN HFA, PROAIR HFA Take 2 Puffs by inhalation every four (4) hours as needed for Wheezing. * albuterol 2.5 mg /3 mL (0.083 %) nebulizer solution Commonly known as:  PROVENTIL VENTOLIN  
  
 buPROPion  mg SR tablet Commonly known as:  WELLBUTRIN SR  
TAKE 1 TABLET BY MOUTH TWICE DAILY  
  
 dexamethasone 4 mg tablet Commonly known as:  DECADRON Take 2 tablets (8mg) by mouth the day before, the day off, and the day after chemotherapy. dicyclomine 10 mg capsule Commonly known as:  BENTYL Take 2 Caps by mouth four (4) times daily. diphenoxylate-atropine 2.5-0.025 mg per tablet Commonly known as:  LOMOTIL Take 1 Tab by mouth four (4) times daily as needed for Diarrhea. Max Daily Amount: 4 Tabs. FINACEA 15 % topical gel Generic drug:  azelaic acid HYDROcodone-acetaminophen 7.5-325 mg per tablet Commonly known as:  Rolinda Doles Take 1 Tab by mouth every four (4) hours as needed for Pain. Max Daily Amount: 6 Tabs. hydroquinone 2 % topical cream  
Apply  to affected area two (2) times a day. lidocaine-prilocaine topical cream  
Commonly known as:  EMLA Apply  to affected area as needed for Pain. Apply 30 to 60 minutes prior to port access. magic mouthwash solution Magic mouth wash  Maalox Lidocaine 2% viscous  Diphenhydramine oral solution   Pharmacy to mix equal portions of ingredients to a total volume as indicated in the dispense amount. Swish and spit four times daily as needed for mouth sores. Mary Pen Needle 32 gauge x 5/32\" Ndle Generic drug:  Insulin Needles (Disposable) USE AS DIRECTED TWICE DAILY  
  
 naproxen 500 mg tablet Commonly known as:  NAPROSYN  
TAKE 1 TABLET BY MOUTH TWICE DAILY WITH MEALS NovoLOG Mix 70-30 FlexPen 100 unit/mL (70-30) Inpn Generic drug:  insulin aspart protamine/insulin aspart USE 15 UNITS BEFORE BREAKFAST AND DINNER  
  
 ondansetron 8 mg disintegrating tablet Commonly known as:  ZOFRAN ODT Take 1 Tab by mouth every eight (8) hours as needed for Nausea. One Touch Delica 33 gauge Misc Generic drug:  lancets TEST THREE TIMES DAILY  
  
 ONETOUCH ULTRA TEST strip Generic drug:  glucose blood VI test strips TEST 3 TIMES A DAY prochlorperazine 10 mg tablet Commonly known as:  COMPAZINE Take 1 Tab by mouth every six (6) hours as needed. Indications: CANCER CHEMOTHERAPY-INDUCED NAUSEA AND VOMITING  
  
 tretinoin 0.025 % tpical gel VICTOZA 2-HUGO 0.6 mg/0.1 mL (18 mg/3 mL) sub-q pen Generic drug:  Liraglutide INJECT 0.3 ML(1.8MG) UNDER THE SKIN EVERY DAY  
  
 * Notice: This list has 2 medication(s) that are the same as other medications prescribed for you. Read the directions carefully, and ask your doctor or other care provider to review them with you. Follow-up Instructions Return in about 4 weeks (around 3/21/2017). To-Do List   
 02/23/2017  8:30 AM  
  Appointment with Best Sherman at [facility] ([phone])  
  
 03/14/2017 8:00 AM  
  Appointment with [facility] MYLENE at [facility] ([phone])  
  
 03/16/2017 8:30 AM  
  Appointment with Best Sherman at [facility] ([phone]) [facility]! Dear Jen Cisneros: 
Thank you for requesting a [employer] account. Our records indicate that you already have an active [employer] account. You can access your account anytime at [email or web address] Did you know that you can access your hospital and ER discharge instructions at any time in [employer]? You can also review all of your test results from your hospital stay or ER visit. Additional Information If you have questions, please visit the Frequently Asked Questions section of the [employer] website at [email or web address] Remember, [employer] is NOT to be used for urgent needs. For medical emergencies, dial 911. Now available from your iPhone and Android! Please provide this summary of care documentation to your next provider. Your primary care clinician is listed as August Beer. If you have any questions after today's visit, please call [phone].

## 2017-02-22 ENCOUNTER — DOCUMENTATION ONLY (OUTPATIENT)
Dept: ONCOLOGY | Age: 47
End: 2017-02-22

## 2017-02-22 NOTE — PROGRESS NOTES
Patient has referral to radiation oncology per provider. Message sent to front office staff to schedule & notify patient with information.

## 2017-02-23 ENCOUNTER — HOSPITAL ENCOUNTER (OUTPATIENT)
Dept: INFUSION THERAPY | Age: 47
Discharge: HOME | End: 2017-02-23
Payer: COMMERCIAL

## 2017-02-23 PROBLEM — T45.1X5A CHEMOTHERAPY-INDUCED NEUROPATHY (HCC): Status: ACTIVE | Noted: 2017-02-23

## 2017-02-23 PROBLEM — Z95.828 PORT CATHETER IN PLACE: Status: ACTIVE | Noted: 2017-02-23

## 2017-02-23 PROBLEM — G62.0 CHEMOTHERAPY-INDUCED NEUROPATHY (HCC): Status: ACTIVE | Noted: 2017-02-23

## 2017-02-25 RX ORDER — NAPROXEN 500 MG/1
TABLET ORAL
Qty: 60 TAB | Refills: 0 | Status: SHIPPED | OUTPATIENT
Start: 2017-02-25 | End: 2017-04-02 | Stop reason: SDUPTHER

## 2017-03-07 ENCOUNTER — HOSPITAL ENCOUNTER (OUTPATIENT)
Dept: INFUSION THERAPY | Age: 47
Discharge: HOME | End: 2017-03-07
Payer: COMMERCIAL

## 2017-03-07 VITALS
TEMPERATURE: 98.6 F | SYSTOLIC BLOOD PRESSURE: 118 MMHG | RESPIRATION RATE: 18 BRPM | OXYGEN SATURATION: 99 % | HEART RATE: 109 BPM | DIASTOLIC BLOOD PRESSURE: 80 MMHG

## 2017-03-07 PROCEDURE — 77030012965 HC NDL HUBR BBMI -A

## 2017-03-07 PROCEDURE — 74011250636 HC RX REV CODE- 250/636: Performed by: INTERNAL MEDICINE

## 2017-03-07 PROCEDURE — 96523 IRRIG DRUG DELIVERY DEVICE: CPT

## 2017-03-07 PROCEDURE — 74011000250 HC RX REV CODE- 250: Performed by: INTERNAL MEDICINE

## 2017-03-07 RX ORDER — HEPARIN 100 UNIT/ML
500 SYRINGE INTRAVENOUS AS NEEDED
Status: ACTIVE | OUTPATIENT
Start: 2017-03-07 | End: 2017-03-08

## 2017-03-07 RX ORDER — SODIUM CHLORIDE 9 MG/ML
10 INJECTION INTRAMUSCULAR; INTRAVENOUS; SUBCUTANEOUS AS NEEDED
Status: ACTIVE | OUTPATIENT
Start: 2017-03-07 | End: 2017-03-08

## 2017-03-07 RX ORDER — SODIUM CHLORIDE 0.9 % (FLUSH) 0.9 %
10-40 SYRINGE (ML) INJECTION AS NEEDED
Status: ACTIVE | OUTPATIENT
Start: 2017-03-07 | End: 2017-03-08

## 2017-03-07 RX ADMIN — SODIUM CHLORIDE 10 ML: 9 INJECTION, SOLUTION INTRAMUSCULAR; INTRAVENOUS; SUBCUTANEOUS at 15:09

## 2017-03-07 RX ADMIN — Medication 500 UNITS: at 15:09

## 2017-03-07 RX ADMIN — Medication 10 ML: at 15:09

## 2017-03-07 NOTE — PROGRESS NOTES
OPIC Short Visit Note:    4171:  Pt arrived ambulatory and in no distress, port accessed without blood return. Port flushed per protocol and de accessed. D/Cd from [facility] at 1510 ambulatory and in no distressed.     Patient Vitals for the past 12 hrs:   Temp Pulse Resp BP SpO2   03/07/17 1454 98.6 °F (37 °C) (!) 109 18 118/80 99 %     Medications  Saline  Heparinized saline

## 2017-03-14 ENCOUNTER — APPOINTMENT (OUTPATIENT)
Dept: INFUSION THERAPY | Age: 47
End: 2017-03-14
Payer: COMMERCIAL

## 2017-03-16 ENCOUNTER — APPOINTMENT (OUTPATIENT)
Dept: INFUSION THERAPY | Age: 47
End: 2017-03-16

## 2017-03-21 ENCOUNTER — OFFICE VISIT (OUTPATIENT)
Dept: ONCOLOGY | Age: 47
End: 2017-03-21

## 2017-03-21 ENCOUNTER — DOCUMENTATION ONLY (OUTPATIENT)
Dept: ONCOLOGY | Age: 47
End: 2017-03-21

## 2017-03-21 VITALS
DIASTOLIC BLOOD PRESSURE: 85 MMHG | BODY MASS INDEX: 46.14 KG/M2 | WEIGHT: 244.4 LBS | TEMPERATURE: 98.9 F | OXYGEN SATURATION: 98 % | HEIGHT: 61 IN | RESPIRATION RATE: 16 BRPM | SYSTOLIC BLOOD PRESSURE: 129 MMHG | HEART RATE: 115 BPM

## 2017-03-21 DIAGNOSIS — Z09 CHEMOTHERAPY FOLLOW-UP EXAMINATION: ICD-10-CM

## 2017-03-21 DIAGNOSIS — M25.60 JOINT STIFFNESS: ICD-10-CM

## 2017-03-21 DIAGNOSIS — Z95.828 PORT CATHETER IN PLACE: ICD-10-CM

## 2017-03-21 DIAGNOSIS — C50.912 STAGE 2 CARCINOMA OF BREAST, ER-, LEFT (HCC): Primary | ICD-10-CM

## 2017-03-21 DIAGNOSIS — T45.1X5A CHEMOTHERAPY-INDUCED NEUROPATHY (HCC): ICD-10-CM

## 2017-03-21 DIAGNOSIS — Z98.890 S/P LUMPECTOMY, LEFT BREAST: ICD-10-CM

## 2017-03-21 DIAGNOSIS — Z17.1 STAGE 2 CARCINOMA OF BREAST, ER-, LEFT (HCC): Primary | ICD-10-CM

## 2017-03-21 DIAGNOSIS — I10 ESSENTIAL HYPERTENSION: ICD-10-CM

## 2017-03-21 DIAGNOSIS — E11.9 TYPE 2 DIABETES MELLITUS WITHOUT COMPLICATION, UNSPECIFIED LONG TERM INSULIN USE STATUS: ICD-10-CM

## 2017-03-21 DIAGNOSIS — G62.0 CHEMOTHERAPY-INDUCED NEUROPATHY (HCC): ICD-10-CM

## 2017-03-21 NOTE — MR AVS SNAPSHOT
Visit Information Date & Time Provider Department Dept. Phone Encounter #  
 3/21/2017  9:00 AM Justin Lau [address] [place] [address] Oncology at [facility] [phone] Follow-up Instructions Return in about 3 months (around 6/21/2017). Routing History Follow-up and Disposition History Your Appointments 3/24/2017 10:30 AM  
Any with Frank Morales MD  
[address] [place] [address] and Primary Care Mirela Ruiz) Appt Note: ELEVATED BLOOD SUGAR; elevated blood sugar  
 [phone]  
[phone]  
  
   
 [address] [phone] Upcoming Health Maintenance Date Due Pneumococcal 19-64 Highest Risk (1 of 3 - PCV13) 6/8/1988 DTaP/Tdap/Td series (1 - Tdap) 6/8/1990 EYE EXAM RETINAL OR DILATED Q1 6/18/2016 FOOT EXAM Q1 7/7/2016 HEMOGLOBIN A1C Q6M 1/14/2017 MICROALBUMIN Q1 7/14/2017 LIPID PANEL Q1 7/14/2017 PAP AKA CERVICAL CYTOLOGY 8/6/2017 Allergies as of 3/21/2017  Review Complete On: 3/21/2017 By: Carlotta Moreno DO Severity Noted Reaction Type Reactions Codeine  02/21/2011    Diarrhea Pravastatin  08/26/2016    Myalgia Terrible leg aching. Theophyl  09/29/2014   Side Effect Diarrhea, Nausea and Vomiting Theophylline  02/21/2011    Diarrhea Current Immunizations  Reviewed on 3/21/2017 Name Date Influenza Vaccine 9/28/2016 Reviewed by Rosie Tirado LPN on 6/29/8759 at  9:10 AM  
You Were Diagnosed With   
  
 Codes Comments Stage 2 carcinoma of breast, ER-, left ([facility] [address]    -  Primary ICD-10-CM: C50.912, Z17.1 ICD-9-CM: 174.9, V86.1 Chemotherapy-induced neuropathy (HCC)     ICD-10-CM: G62.0, T45.1X5A 
ICD-9-CM: 357.6, E933.1 Port catheter in place     ICD-10-CM: V45.546 ICD-9-CM: V45.89 Type 2 diabetes mellitus without complication, unspecified long term insulin use status (HCC)     ICD-10-CM: E11.9 ICD-9-CM: 250.00 Chemotherapy follow-up examination     ICD-10-CM: Y60 ICD-9-CM: V67.2 S/P lumpectomy, left breast     ICD-10-CM: B44.31 ICD-9-CM: V45.89 Essential hypertension     ICD-10-CM: I10 
ICD-9-CM: 401.9 Joint stiffness     ICD-10-CM: M25.60 ICD-9-CM: 719.50 Vitals BP Pulse Temp Resp Height(growth percentile) Weight(growth percentile) 129/85 (!) 115 98.9 °F (37.2 °C) (Oral) 16 5' 1\" (1.549 m) 244 lb 6.4 oz (110.9 kg) SpO2 BMI OB Status Smoking Status 98% 46.18 kg/m2 Hysterectomy Never Smoker Vitals History BMI and BSA Data Body Mass Index Body Surface Area  
 46.18 kg/m 2 2.18 m 2 Preferred Pharmacy Pharmacy Name Phone [facility] [address] [place] [address] [phone] Your Updated Medication List  
  
   
This list is accurate as of: 3/21/17  9:46 AM.  Always use your most recent med list.  
  
  
  
  
 acetaminophen 325 mg tablet Commonly known as:  TYLENOL Take 650 mg by mouth every four (4) hours as needed for Pain. * albuterol 90 mcg/actuation inhaler Commonly known as:  PROVENTIL HFA, VENTOLIN HFA, PROAIR HFA Take 2 Puffs by inhalation every four (4) hours as needed for Wheezing. * albuterol 2.5 mg /3 mL (0.083 %) nebulizer solution Commonly known as:  PROVENTIL VENTOLIN  
  
 buPROPion  mg SR tablet Commonly known as:  WELLBUTRIN SR  
TAKE 1 TABLET BY MOUTH TWICE DAILY  
  
 dexamethasone 4 mg tablet Commonly known as:  DECADRON Take 2 tablets (8mg) by mouth the day before, the day off, and the day after chemotherapy. dicyclomine 10 mg capsule Commonly known as:  BENTYL Take 2 Caps by mouth four (4) times daily. diphenoxylate-atropine 2.5-0.025 mg per tablet Commonly known as:  LOMOTIL Take 1 Tab by mouth four (4) times daily as needed for Diarrhea. Max Daily Amount: 4 Tabs. FINACEA 15 % topical gel Generic drug:  azelaic acid HYDROcodone-acetaminophen 7.5-325 mg per tablet Commonly known as:  Eric Cielo Take 1 Tab by mouth every four (4) hours as needed for Pain. Max Daily Amount: 6 Tabs. hydroquinone 2 % topical cream  
Apply  to affected area two (2) times a day. lidocaine-prilocaine topical cream  
Commonly known as:  EMLA Apply  to affected area as needed for Pain. Apply 30 to 60 minutes prior to port access. magic mouthwash solution Magic mouth wash  Maalox Lidocaine 2% viscous  Diphenhydramine oral solution   Pharmacy to mix equal portions of ingredients to a total volume as indicated in the dispense amount. Swish and spit four times daily as needed for mouth sores. Mary Pen Needle 32 gauge x 5/32\" Ndle Generic drug:  Insulin Needles (Disposable) USE AS DIRECTED TWICE DAILY  
  
 naproxen 500 mg tablet Commonly known as:  NAPROSYN  
TAKE 1 TABLET BY MOUTH TWICE DAILY WITH MEALS NovoLOG Mix 70-30 FlexPen 100 unit/mL (70-30) Inpn Generic drug:  insulin aspart protamine/insulin aspart USE 15 UNITS BEFORE BREAKFAST AND DINNER  
  
 ondansetron 8 mg disintegrating tablet Commonly known as:  ZOFRAN ODT Take 1 Tab by mouth every eight (8) hours as needed for Nausea. One Touch Delica 33 gauge Misc Generic drug:  lancets TEST THREE TIMES DAILY  
  
 ONETOUCH ULTRA TEST strip Generic drug:  glucose blood VI test strips TEST 3 TIMES A DAY prochlorperazine 10 mg tablet Commonly known as:  COMPAZINE Take 1 Tab by mouth every six (6) hours as needed. Indications: CANCER CHEMOTHERAPY-INDUCED NAUSEA AND VOMITING  
  
 tretinoin 0.025 % tpical gel VICTOZA 2-HUGO 0.6 mg/0.1 mL (18 mg/3 mL) sub-q pen Generic drug:  Liraglutide INJECT 0.3 ML(1.8MG) UNDER THE SKIN EVERY DAY  
  
 * Notice:   This list has 2 medication(s) that are the same as other medications prescribed for you. Read the directions carefully, and ask your doctor or other care provider to review them with you. We Performed the Following REFERRAL TO BREAST SURGERY [REF10 Custom] Comments:  
 Please evaluate patient for port removal  
  
Follow-up Instructions Return in about 3 months (around 6/21/2017). To-Do List   
 04/04/2017 2:30 PM  
  Appointment with [facility] ([phone])  
  
 05/02/2017 2:30 PM  
  Appointment with [facility] ([phone])  
  
 05/30/2017 2:30 PM  
  Appointment with [facility] ([phone])  
  
 06/27/2017 2:30 PM  
  Appointment with [facility] ([phone])  
  
 07/25/2017 2:30 PM  
  Appointment with [facility] ([phone]) Referral Information Referral ID Referred By Referred To  
  
 [id number] EMELY ARCHIBALD Se., MD [place], [address] Phone: [phone] Fax: [phone] Visits Status Start Date End Date 1 New Request 3/21/17 3/21/18 If your referral has a status of pending review or denied, additional information will be sent to support the outcome of this decision. Introducing [facility] SERVICES! Dear Samuel Medrano: 
Thank you for requesting a [email or web address] account. Our records indicate that you already have an active [email or web address] account. You can access your account anytime at [email or web address] Did you know that you can access your hospital and ER discharge instructions at any time in [email or web address]? You can also review all of your test results from your hospital stay or ER visit. Additional Information If you have questions, please visit the Frequently Asked Questions section of the [email or web address] website at [email or web address] Remember, MyChart is NOT to be used for urgent needs. For medical emergencies, dial 911. Now available from your iPhone and Android! Please provide this summary of care documentation to your next provider. Your primary care clinician is listed as Elen Mora. If you have any questions after today's visit, please call [phone].

## 2017-03-21 NOTE — PROGRESS NOTES
HEME/ONC PROGRESS NOTE       Cheyenne Parsons is a 51 y.o. 1970 female and presents with Breast Cancer    CC  Triple neg left breast cancer 8/16    HPI:  Pt seen today for office f/u for breast cancer done TAC chemo x 4. Pt still has some neuropathy from chemo/ leg tightness/ stiffnes also. advil helps well. Pt is doing good overall. Pt is doing XRT 6/30 treatments now/ doing at [facility]. Having breast ultrasound on thurs at [facility] per rad/onc. Ready to get port out. DX   Encounter Diagnoses   Name Primary?  Stage 2 carcinoma of breast, ER-, left ([address] Yes    Chemotherapy-induced neuropathy ([address]     Port catheter in place     Type 2 diabetes mellitus without complication, unspecified long term insulin use status ([address]     Chemotherapy follow-up examination     S/P lumpectomy, left breast     Essential hypertension     Joint stiffness         STAGE: T2N0 triple neg    TREATMENT COURSE: lumpectomy 10/16 with b/l reduction, Adjuvant chemotherapy with TAC x 4, stopped due to peripheral neuropathy  Radiation 3/17 at [facility].      Past Medical History:   Diagnosis Date    Arthritis     Asthma     Cancer ([address]     BREAST left    Chronic pain     Diabetes ([address]     Dyslipidemia     IBS (irritable bowel syndrome)     Nausea & vomiting     Noncompliance     Obesity     Psychiatric disorder     S/P breast biopsy, left 8/1/16    Sinusitis     Stress at home      Past Surgical History:   Procedure Laterality Date    HX BREAST LUMPECTOMY Left 10/13/2016    LEFT BREAST REDUCTION LUMPECTOMY, LEFT SENTINEL NODE BIOPSY, LEFT BRACKETED NEEDLE LOCALIZATION, LEFT BREAST RECONSTRUCTION performed by Gerard Rubio MD at [address] [place] [address] HX BREAST RECONSTRUCTION Bilateral 10/13/2016    BREAST REDUCTION performed by Leo [facility]er MD at [facility]      X2    HX GYN      ABLATION    HX HERNIA REPAIR      AS INFANT    HX HYSTERECTOMY      full    HX ORTHOPAEDIC      L THUMB     Social History     Social History    Marital status: SINGLE     Spouse name: N/A    Number of children: N/A    Years of education: N/A     Social History Main Topics    Smoking status: Never Smoker    Smokeless tobacco: Never Used    Alcohol use No    Drug use: No    Sexual activity: Not Asked     Other Topics Concern    None     Social History Narrative    ** Merged History Encounter **          Family History   Problem Relation Age of Onset    Heart Disease Mother     Hypertension Mother     Heart Disease Father     Seizures Sister     Anesth Problems Neg Hx        Current Outpatient Prescriptions   Medication Sig Dispense Refill    naproxen (NAPROSYN) 500 mg tablet TAKE 1 TABLET BY MOUTH TWICE DAILY WITH MEALS 60 Tab 0    buPROPion SR (WELLBUTRIN SR) 150 mg SR tablet TAKE 1 TABLET BY MOUTH TWICE DAILY 30 Tab 3    ONE TOUCH DELICA 33 gauge misc TEST THREE TIMES DAILY 100 Lancet 11    ONETOUCH ULTRA TEST strip TEST 3 TIMES A  Strip 11    hydroquinone 2 % topical cream Apply  to affected area two (2) times a day.  VICTOZA 2-HUGO 0.6 mg/0.1 mL (18 mg/3 mL) sub-q pen INJECT 0.3 ML(1.8MG) UNDER THE SKIN EVERY DAY 6 mL 11    dicyclomine (BENTYL) 10 mg capsule Take 2 Caps by mouth four (4) times daily. (Patient taking differently: Take 20 mg by mouth four (4) times daily. Patient stated she usually takes 10mg twice daily) 120 Cap 11    NOVOLOG MIX 70-30 FLEXPEN 100 unit/mL (70-30) inpn USE 15 UNITS BEFORE BREAKFAST AND DINNER 15 mL 11    KELLY PEN NEEDLE 32 gauge x 5/32\" ndle USE AS DIRECTED TWICE DAILY 100 Pen Needle 11    FINACEA 15 % topical gel   5    tretinoin 0.025 % tpical gel   3    albuterol (PROVENTIL HFA, VENTOLIN HFA, PROAIR HFA) 90 mcg/actuation inhaler Take 2 Puffs by inhalation every four (4) hours as needed for Wheezing.  1 Inhaler 11    magic mouthwash solution Magic mouth wash   Maalox  Lidocaine 2% viscous   Diphenhydramine oral solution Pharmacy to mix equal portions of ingredients to a total volume as indicated in the dispense amount. Swish and spit four times daily as needed for mouth sores. 240 mL 1    diphenoxylate-atropine (LOMOTIL) 2.5-0.025 mg per tablet Take 1 Tab by mouth four (4) times daily as needed for Diarrhea. Max Daily Amount: 4 Tabs. 30 Tab 1    lidocaine-prilocaine (EMLA) topical cream Apply  to affected area as needed for Pain. Apply 30 to 60 minutes prior to port access. 30 g 0    dexamethasone (DECADRON) 4 mg tablet Take 2 tablets (8mg) by mouth the day before, the day off, and the day after chemotherapy. 30 Tab 1    ondansetron (ZOFRAN ODT) 8 mg disintegrating tablet Take 1 Tab by mouth every eight (8) hours as needed for Nausea. 30 Tab 2    prochlorperazine (COMPAZINE) 10 mg tablet Take 1 Tab by mouth every six (6) hours as needed. Indications: CANCER CHEMOTHERAPY-INDUCED NAUSEA AND VOMITING 30 Tab 2    HYDROcodone-acetaminophen (NORCO) 7.5-325 mg per tablet Take 1 Tab by mouth every four (4) hours as needed for Pain. Max Daily Amount: 6 Tabs. 35 Tab 0    albuterol (PROVENTIL VENTOLIN) 2.5 mg /3 mL (0.083 %) nebulizer solution       acetaminophen (TYLENOL) 325 mg tablet Take 650 mg by mouth every four (4) hours as needed for Pain. Allergies   Allergen Reactions    Codeine Diarrhea    Pravastatin Myalgia     Terrible leg aching.      Theophyl Diarrhea and Nausea and Vomiting    Theophylline Diarrhea       Review of Systems    A comprehensive review of systems was negative except for: per HPI     Objective:  Visit Vitals    /85    Pulse (!) 115    Temp 98.9 °F (37.2 °C) (Oral)    Resp 16    Ht 5' 1\" (1.549 m)    Wt 244 lb 6.4 oz (110.9 kg)    SpO2 98%    BMI 46.18 kg/m2       Physical Exam:   General appearance - alert, well appearing, and in no distress, oriented to person, place, and time and overweight  Mental status - alert, oriented to person, place, and time, normal mood, behavior, speech, dress, motor activity, and thought processes  EYE-conj clear  Mouth - mucous membranes pink, moist, intact. No lesions or thrush. Neck - supple  Chest - clear to auscultation bilaterally  Heart - normal rate and regular rhythm   Abdomen - round, soft, nontender  Ext-no pedal edema noted bilaterally  Skin-Warm and dry. Intact without rash. Neuro -alert, oriented, normal speech, no focal findings or movement disorder noted  Breast no masses on right/  Lump at surgical scar on left/ minimal XRT changes    Diagnostic Imaging   reviewed  [facility] Results (most recent):    Results from Hospital Encounter encounter on 10/13/16   [facility] [address] [facility] NDL/WIRE/CLIP/SEED BREAST LT   Narrative Clinical History:  65-year-old female with left breast cancer, presenting for  needle localization prior to surgical excision . Comparison and correlation has been performed with recent MRI as well as  mammography, both from August, 2016. Procedure:  Bracketing wire mammographic guided wire localization utilizing 2  wires    Technical Description:  The risks, benefits, and alternatives of needle  localization were discussed with the patient who gave verbal and written  consent. The skin was prepped with chloraprep. Local lidocaine was used for anesthesia. A needle was placed in each abnormal area. Positioning was confirmed with  additional imaging. A Ireland wire was then placed within each needle and each  needle was removed. The patient tolerated the procedure well with no immediate  complications. Explanation of positioning of the 2 wires:  Using mammographic guidance, the mass in the 9 o'clock position of the posterior  third of the left breast was localized. Of note, the mass is approximately 15  to 20 mm inferior to the biopsy clip, and was localized rather than the biopsy  clip. This represents the posterior wire.  Next, microcalcifications were  localized anterior to the malignancy, in the approximate location of MR  abnormality. This serves as the anterior wire. Post procedure digital mammogram shows the malignancy and approximate anterior  extension localized with bracketing wires from a medial approach. Impression Impression:     Status post bracketing wire localization utilizing 2 wires. Specimen radiograph  is recommended. Lab Results  reviewed  Lab Results   Component Value Date/Time    WBC 16.7 02/21/2017 08:24 AM    HGB 9.0 02/21/2017 08:24 AM    HCT 28.0 02/21/2017 08:24 AM    PLATELET 472 99/12/8753 08:24 AM    MCV 90.6 02/21/2017 08:24 AM       Lab Results   Component Value Date/Time    Sodium 140 02/21/2017 08:24 AM    Potassium 4.2 02/21/2017 08:24 AM    Chloride 105 02/21/2017 08:24 AM    CO2 22 02/21/2017 08:24 AM    Anion gap 13 02/21/2017 08:24 AM    Glucose 283 02/21/2017 08:24 AM    BUN 10 02/21/2017 08:24 AM    Creatinine 0.61 02/21/2017 08:24 AM    BUN/Creatinine ratio 16 02/21/2017 08:24 AM    GFR est AA >60 02/21/2017 08:24 AM    GFR est non-AA >60 02/21/2017 08:24 AM    Calcium 8.9 02/21/2017 08:24 AM    AST (SGOT) 9 02/21/2017 08:24 AM    Alk. phosphatase 75 02/21/2017 08:24 AM    Protein, total 5.9 02/21/2017 08:24 AM    Albumin 3.1 02/21/2017 08:24 AM    Globulin 2.8 02/21/2017 08:24 AM    A-G Ratio 1.1 02/21/2017 08:24 AM    ALT (SGPT) 18 02/21/2017 08:24 AM       Assessment/Plan:     Errol Munoz is a  51 y.o. female and presents with Breast Cancer    CC  Triple neg left breast cancer 8/16    HPI  Pt seen today for office fu for triple neg left breast cancer. DX   Encounter Diagnoses   Name Primary?     Triple negative malignant neoplasm of breast ([facility] Yes    S/P lumpectomy, left breast     Stage 2 carcinoma of breast, ER-, left ([facility]     Morbid obesity due to excess calories ([facility]     Type 2 diabetes mellitus without complication, unspecified long term insulin use status ([facility]     Irritable bowel syndrome without diarrhea     Essential hypertension STAGE: T2N0 triple neg    TREATMENT COURSE: lumpectomy 101/6    1. Stage 2 triple neg breast cancer s/p lumpectomy/ b/l breast reduction/.     MERCEDES. Pt stopped adjuvant TAC due to side effects. Recovering from chemo well. Still has some neuropathy. Pt is doing radiation and tolerating this well/ at [facility]. Labs good. Fine for port removal/ back to surgery. Pt has lump at surgical scar and rad/onc is doing ultrasound for eval of this. 2. HTN/ asthma/ DM. Per PCP. 3. Peripheral neuropathy. Monitor. advil helping. Follow-up in 3 month. ICD-10-CM ICD-9-CM    1. Stage 2 carcinoma of breast, ER-, left (HCC) C50.912 174.9 REFERRAL TO BREAST SURGERY    Z17.1 V86.1    2. Chemotherapy-induced neuropathy (HCC) G62.0 357.6 REFERRAL TO BREAST SURGERY    T45.1X5A E933.1    3. Port catheter in place Z95.828 V45.89 REFERRAL TO BREAST SURGERY   4. Type 2 diabetes mellitus without complication, unspecified long term insulin use status (HCC) E11.9 250.00 REFERRAL TO BREAST SURGERY   5. Chemotherapy follow-up examination Z09 V67.2 REFERRAL TO BREAST SURGERY   6. S/P lumpectomy, left breast Z90.12 V45.89 REFERRAL TO BREAST SURGERY   7. Essential hypertension I10 401.9 REFERRAL TO BREAST SURGERY   8. Joint stiffness M25.60 719.50 REFERRAL TO BREAST SURGERY       Current Outpatient Prescriptions   Medication Sig    naproxen (NAPROSYN) 500 mg tablet TAKE 1 TABLET BY MOUTH TWICE DAILY WITH MEALS    buPROPion SR (WELLBUTRIN SR) 150 mg SR tablet TAKE 1 TABLET BY MOUTH TWICE DAILY    ONE TOUCH DELICA 33 gauge misc TEST THREE TIMES DAILY    ONETOUCH ULTRA TEST strip TEST 3 TIMES A DAY    hydroquinone 2 % topical cream Apply  to affected area two (2) times a day.  VICTOZA 2-HUGO 0.6 mg/0.1 mL (18 mg/3 mL) sub-q pen INJECT 0.3 ML(1.8MG) UNDER THE SKIN EVERY DAY    dicyclomine (BENTYL) 10 mg capsule Take 2 Caps by mouth four (4) times daily.  (Patient taking differently: Take 20 mg by mouth four (4) times daily. Patient stated she usually takes 10mg twice daily)    NOVOLOG MIX 70-30 FLEXPEN 100 unit/mL (70-30) inpn USE 15 UNITS BEFORE BREAKFAST AND DINNER    KELLY PEN NEEDLE 32 gauge x 5/32\" ndle USE AS DIRECTED TWICE DAILY    FINACEA 15 % topical gel     tretinoin 0.025 % tpical gel     albuterol (PROVENTIL HFA, VENTOLIN HFA, PROAIR HFA) 90 mcg/actuation inhaler Take 2 Puffs by inhalation every four (4) hours as needed for Wheezing.  magic mouthwash solution Magic mouth wash   Maalox  Lidocaine 2% viscous   Diphenhydramine oral solution     Pharmacy to mix equal portions of ingredients to a total volume as indicated in the dispense amount. Swish and spit four times daily as needed for mouth sores.  diphenoxylate-atropine (LOMOTIL) 2.5-0.025 mg per tablet Take 1 Tab by mouth four (4) times daily as needed for Diarrhea. Max Daily Amount: 4 Tabs.  lidocaine-prilocaine (EMLA) topical cream Apply  to affected area as needed for Pain. Apply 30 to 60 minutes prior to port access.  dexamethasone (DECADRON) 4 mg tablet Take 2 tablets (8mg) by mouth the day before, the day off, and the day after chemotherapy.  ondansetron (ZOFRAN ODT) 8 mg disintegrating tablet Take 1 Tab by mouth every eight (8) hours as needed for Nausea.  prochlorperazine (COMPAZINE) 10 mg tablet Take 1 Tab by mouth every six (6) hours as needed. Indications: CANCER CHEMOTHERAPY-INDUCED NAUSEA AND VOMITING    HYDROcodone-acetaminophen (NORCO) 7.5-325 mg per tablet Take 1 Tab by mouth every four (4) hours as needed for Pain. Max Daily Amount: 6 Tabs.  albuterol (PROVENTIL VENTOLIN) 2.5 mg /3 mL (0.083 %) nebulizer solution     acetaminophen (TYLENOL) 325 mg tablet Take 650 mg by mouth every four (4) hours as needed for Pain. No current facility-administered medications for this visit. continue present plan, call if any problems  There are no Patient Instructions on file for this visit.    Follow-up Disposition:  Return in about 4 weeks (around 4/18/2017).     Edgar Stanton, DO

## 2017-03-22 DIAGNOSIS — C50.212 MALIGNANT NEOPLASM OF UPPER-INNER QUADRANT OF LEFT FEMALE BREAST (HCC): Primary | ICD-10-CM

## 2017-03-22 NOTE — PROGRESS NOTES
[facility] 4-13-17 @ 2:30 PM PATIENT TO ARRIVE AT 1:30 PM (PAC REMOVAL)  PATIENT NOTIFIED

## 2017-03-24 ENCOUNTER — OFFICE VISIT (OUTPATIENT)
Dept: INTERNAL MEDICINE CLINIC | Age: 47
End: 2017-03-24

## 2017-03-24 VITALS
DIASTOLIC BLOOD PRESSURE: 71 MMHG | HEART RATE: 102 BPM | BODY MASS INDEX: 45.88 KG/M2 | WEIGHT: 243 LBS | TEMPERATURE: 96.9 F | HEIGHT: 61 IN | OXYGEN SATURATION: 98 % | SYSTOLIC BLOOD PRESSURE: 144 MMHG | RESPIRATION RATE: 95 BRPM

## 2017-03-24 DIAGNOSIS — E66.01 MORBID OBESITY DUE TO EXCESS CALORIES (HCC): ICD-10-CM

## 2017-03-24 DIAGNOSIS — C50.912 STAGE 2 CARCINOMA OF BREAST, ER-, LEFT (HCC): ICD-10-CM

## 2017-03-24 DIAGNOSIS — T45.1X5A CHEMOTHERAPY-INDUCED NEUROPATHY (HCC): ICD-10-CM

## 2017-03-24 DIAGNOSIS — E11.9 TYPE 2 DIABETES MELLITUS WITHOUT COMPLICATION, UNSPECIFIED LONG TERM INSULIN USE STATUS: Primary | ICD-10-CM

## 2017-03-24 DIAGNOSIS — Z17.1 STAGE 2 CARCINOMA OF BREAST, ER-, LEFT (HCC): ICD-10-CM

## 2017-03-24 DIAGNOSIS — G62.0 CHEMOTHERAPY-INDUCED NEUROPATHY (HCC): ICD-10-CM

## 2017-03-24 NOTE — PROGRESS NOTES
SPORTS MEDICINE AND PRIMARY CARE  Andrew Doll MD, [address] [address]  Phone:  [phone]  Fax: [phone]       Chief Complaint   Patient presents with    Follow-up     Elevated blood sugar    . SUBJECTIVE:    Christ Green is a 51 y.o. female Patient returns today ambulatory, alert and appropriate and has the capacity to give an accurate history. She has a known history of anemia, left breast cancer, diabetes, irritable bowel syndrome and chronic pain. Blood sugar control has been poor with a hemoglobin A1C greater than 8. Patient returns today complaining of leg discomfort and neuropathy, particularly involving her fingers. Otherwise she denies new complaints and is seen for evaluation. Current Outpatient Prescriptions   Medication Sig Dispense Refill    naproxen (NAPROSYN) 500 mg tablet TAKE 1 TABLET BY MOUTH TWICE DAILY WITH MEALS 60 Tab 0    magic mouthwash solution Magic mouth wash   Maalox  Lidocaine 2% viscous   Diphenhydramine oral solution     Pharmacy to mix equal portions of ingredients to a total volume as indicated in the dispense amount. Swish and spit four times daily as needed for mouth sores. 240 mL 1    buPROPion SR (WELLBUTRIN SR) 150 mg SR tablet TAKE 1 TABLET BY MOUTH TWICE DAILY 30 Tab 3    ONE TOUCH DELICA 33 gauge misc TEST THREE TIMES DAILY 100 Lancet 11    diphenoxylate-atropine (LOMOTIL) 2.5-0.025 mg per tablet Take 1 Tab by mouth four (4) times daily as needed for Diarrhea. Max Daily Amount: 4 Tabs. 30 Tab 1    HYDROcodone-acetaminophen (NORCO) 7.5-325 mg per tablet Take 1 Tab by mouth every four (4) hours as needed for Pain. Max Daily Amount: 6 Tabs. 35 Tab 0    ONETOUCH ULTRA TEST strip TEST 3 TIMES A  Strip 11    albuterol (PROVENTIL VENTOLIN) 2.5 mg /3 mL (0.083 %) nebulizer solution       acetaminophen (TYLENOL) 325 mg tablet Take 650 mg by mouth every four (4) hours as needed for Pain.       hydroquinone 2 % topical cream Apply  to affected area two (2) times a day.  dicyclomine (BENTYL) 10 mg capsule Take 2 Caps by mouth four (4) times daily. (Patient taking differently: Take 20 mg by mouth four (4) times daily. Patient stated she usually takes 10mg twice daily) 120 Cap 11    NOVOLOG MIX 70-30 FLEXPEN 100 unit/mL (70-30) inpn USE 15 UNITS BEFORE BREAKFAST AND DINNER 15 mL 11    KELLY PEN NEEDLE 32 gauge x \" ndle USE AS DIRECTED TWICE DAILY 100 Pen Needle 11    FINACEA 15 % topical gel   5    tretinoin 0.025 % tpical gel   3    albuterol (PROVENTIL HFA, VENTOLIN HFA, PROAIR HFA) 90 mcg/actuation inhaler Take 2 Puffs by inhalation every four (4) hours as needed for Wheezing. 1 Inhaler 11     Past Medical History:   Diagnosis Date    Arthritis     Asthma     Cancer ([address]     BREAST left    Chronic pain     Diabetes ([address]     Dyslipidemia     IBS (irritable bowel syndrome)     Nausea & vomiting     Noncompliance     Obesity     Psychiatric disorder     S/P breast biopsy, left 16    Sinusitis     Stress at home      Past Surgical History:   Procedure Laterality Date    HX BREAST LUMPECTOMY Left 10/13/2016    LEFT BREAST REDUCTION LUMPECTOMY, LEFT SENTINEL NODE BIOPSY, LEFT BRACKETED NEEDLE LOCALIZATION, LEFT BREAST RECONSTRUCTION performed by Elliot Clark MD at [address] HX BREAST RECONSTRUCTION Bilateral 10/13/2016    BREAST REDUCTION performed by Merritt Coburn MD at [address] HX  SECTION      X2    HX GYN      ABLATION    HX HERNIA REPAIR      AS INFANT    HX HYSTERECTOMY      full    HX ORTHOPAEDIC      L THUMB     Allergies   Allergen Reactions    Codeine Diarrhea    Pravastatin Myalgia     Terrible leg aching.      Theophyl Diarrhea and Nausea and Vomiting    Theophylline Diarrhea         REVIEW OF SYSTEMS:  General: negative for - chills or fever  ENT: negative for - headaches, nasal congestion or tinnitus  Respiratory: negative for - cough, hemoptysis, shortness of breath or wheezing  Cardiovascular : negative for - chest pain, edema, palpitations or shortness of breath  Gastrointestinal: negative for - abdominal pain, blood in stools, heartburn or nausea/vomiting  Genito-Urinary: no dysuria, trouble voiding, or hematuria  Musculoskeletal: negative for - gait disturbance, joint pain, joint stiffness or joint swelling  Neurological: no TIA or stroke symptoms  Hematologic: no bruises, no bleeding, no swollen glands  Integument: no lumps, mole changes, nail changes or rash  Endocrine: no malaise/lethargy or unexpected weight changes      Social History     Social History    Marital status: SINGLE     Spouse name: N/A    Number of children: N/A    Years of education: N/A     Social History Main Topics    Smoking status: Never Smoker    Smokeless tobacco: Never Used    Alcohol use No    Drug use: No    Sexual activity: Not Asked     Other Topics Concern    None     Social History Narrative    ** Merged History Encounter **          Family History   Problem Relation Age of Onset    Heart Disease Mother     Hypertension Mother     Heart Disease Father     Seizures Sister     Anesth Problems Neg Hx        OBJECTIVE:    Visit Vitals    /71 (BP 1 Location: Right arm, BP Patient Position: Sitting)    Pulse (!) 102    Temp 96.9 °F (36.1 °C) (Oral)    Resp (!) 95    Ht 5' 1\" (1.549 m)    Wt 243 lb (110.2 kg)    SpO2 98%    BMI 45.91 kg/m2     CONSTITUTIONAL: well , well nourished, appears age appropriate  EYES: perrla, eom intact  ENMT:moist mucous membranes, pharynx clear  NECK: supple.  Thyroid normal  RESPIRATORY: Chest: clear bilaterally   CARDIOVASCULAR: Heart: regular rate and rhythm  GASTROINTESTINAL: Abdomen: soft, bowel sounds active  HEMATOLOGIC: no pathological lymph nodes palpated  MUSCULOSKELETAL: Extremities: no edema, pulse 1+ Foot exam reveals onychomycosis bilaterally which is chemotherapy induced. No other lesions. Sensation is intact and stocking like anesthesia pattern. Pulses are present. INTEGUMENT: No unusual rashes or suspicious skin lesions noted. Nails appear normal.  NEUROLOGIC: non-focal exam   MENTAL STATUS: alert and oriented, appropriate affect           ASSESSMENT:  1. Type 2 diabetes mellitus without complication, unspecified long term insulin use status (HCC)    2. Stage 2 carcinoma of breast, ER-, left ([address]    3. Chemotherapy-induced neuropathy ([facility]    4. Morbid obesity due to excess calories (HCC)      Patient's blood sugars have been poorly controlled during the time that she was doing the chemo. She has finished chemotherapy and now is receiving number 8 radiation therapy treatments of 30. Other new complaints are denied. Patient is seen for evaluation. Blood pressure is about the same. No change will be made today in her medications. We will check her metabolic status, particularly looking at her hemoglobin A1C which we are concerned. Patient will call Dr. Rubina Lindquist her ophthalmologist to make the appointment. We encourage her to have him send us his notes after he completes the exam.    Her BMI represents a one pound weight loss. She will return to see us in about four months, sooner if she has any problems. Due to insurance changes she will have to stop the Victoza for which her insurance currently will not pay for. Nor will they pay for the Novolog. They said \"generic only. \"        PLAN:  .  Orders Placed This Encounter    HEMOGLOBIN A1C WITH EAG    URINALYSIS W/ RFLX MICROSCOPIC    CBC WITH AUTOMATED DIFF    METABOLIC PANEL, COMPREHENSIVE    LIPID PANEL    TSH 3RD GENERATION       Follow-up Disposition:  Return in about 3 months (around 6/24/2017). ATTENTION:   This medical record was transcribed using an electronic medical records system.   Although proofread, it may and can contain electronic and spelling errors. Other human spelling and other errors may be present. Corrections may be executed at a later time. Please feel free to contact us for any clarifications as needed.

## 2017-03-24 NOTE — PROGRESS NOTES
1. Have you been to the ER, urgent care clinic since your last visit? Hospitalized since your last visit? No    2. Have you seen or consulted any other health care providers outside of the [address] [place] [address] since your last visit? Include any pap smears or colon screening.  No

## 2017-03-24 NOTE — MR AVS SNAPSHOT
Visit Information Date & Time Provider Department Dept. Phone Encounter #  
 3/24/2017 10:30 AM Reba Gan MD [facility] [phone] [id number] Follow-up Instructions Return in about 3 months (around 6/24/2017). Follow-up and Disposition History Your Appointments 4/13/2017  4:00 PM  
SURGERY with Kyung Segundo MD  
[facility] [address]) Appt Note: [address] REMOVAL; [address] npr/ 3-22-17 ls; [address] REMOVAL  
 [address] [address]  
  
    
 6/19/2017  8:30 AM  
Follow Up with Gibson Ganser, DO [facility] at [facility]) Appt Note: Breast Ca, 3 month f/u  
 [address] [address]  
[phone]  
  
   
 [address] [address] Upcoming Health Maintenance Date Due Pneumococcal 19-64 Highest Risk (1 of 3 - PCV13) 6/8/1988 DTaP/Tdap/Td series (1 - Tdap) 6/8/1990 EYE EXAM RETINAL OR DILATED Q1 6/18/2016 FOOT EXAM Q1 7/7/2016 HEMOGLOBIN A1C Q6M 1/14/2017 MICROALBUMIN Q1 7/14/2017 LIPID PANEL Q1 7/14/2017 PAP AKA CERVICAL CYTOLOGY 8/6/2017 Allergies as of 3/24/2017  Review Complete On: 3/24/2017 By: Reba Gan MD  
  
 Severity Noted Reaction Type Reactions Codeine  02/21/2011    Diarrhea Pravastatin  08/26/2016    Myalgia Terrible leg aching. Theophyl  09/29/2014   Side Effect Diarrhea, Nausea and Vomiting Theophylline  02/21/2011    Diarrhea Current Immunizations  Reviewed on 3/21/2017 Name Date Influenza Vaccine 9/28/2016 Not reviewed this visit You Were Diagnosed With   
  
 Codes Comments Type 2 diabetes mellitus without complication, unspecified long term insulin use status (HCC)    -  Primary ICD-10-CM: E11.9 ICD-9-CM: 250.00 Stage 2 carcinoma of breast, ER-, left ([facility]     ICD-10-CM: C50.912, Z17.1 ICD-9-CM: 174.9, V86.1 Chemotherapy-induced neuropathy (HCC)     ICD-10-CM: G62.0, T45.1X5A 
ICD-9-CM: 357.6, E933.1 Morbid obesity due to excess calories (HCC)     ICD-10-CM: E66.01 
ICD-9-CM: 278.01 Vitals BP Pulse Temp Resp Height(growth percentile) Weight(growth percentile) 144/71 (BP 1 Location: Right arm, BP Patient Position: Sitting) (!) 102 96.9 °F (36.1 °C) (Oral) (!) 95 5' 1\" (1.549 m) 243 lb (110.2 kg) SpO2 BMI OB Status Smoking Status 98% 45.91 kg/m2 Hysterectomy Never Smoker BMI and BSA Data Body Mass Index Body Surface Area 45.91 kg/m 2 2.18 m 2 Preferred Pharmacy Pharmacy Name Phone [facility] [address] [phone] [facility] [phone] Your Updated Medication List  
  
   
This list is accurate as of: 3/24/17 12:35 PM.  Always use your most recent med list.  
  
  
  
  
 acetaminophen 325 mg tablet Commonly known as:  TYLENOL Take 650 mg by mouth every four (4) hours as needed for Pain. * albuterol 90 mcg/actuation inhaler Commonly known as:  PROVENTIL HFA, VENTOLIN HFA, PROAIR HFA Take 2 Puffs by inhalation every four (4) hours as needed for Wheezing. * albuterol 2.5 mg /3 mL (0.083 %) nebulizer solution Commonly known as:  PROVENTIL VENTOLIN  
  
 buPROPion  mg SR tablet Commonly known as:  WELLBUTRIN SR  
TAKE 1 TABLET BY MOUTH TWICE DAILY  
  
 dicyclomine 10 mg capsule Commonly known as:  BENTYL Take 2 Caps by mouth four (4) times daily. diphenoxylate-atropine 2.5-0.025 mg per tablet Commonly known as:  LOMOTIL Take 1 Tab by mouth four (4) times daily as needed for Diarrhea. Max Daily Amount: 4 Tabs. FINACEA 15 % topical gel Generic drug:  azelaic acid HYDROcodone-acetaminophen 7.5-325 mg per tablet Commonly known as:  Lenon Gauze Take 1 Tab by mouth every four (4) hours as needed for Pain. Max Daily Amount: 6 Tabs. hydroquinone 2 % topical cream  
Apply  to affected area two (2) times a day. magic mouthwash solution Magic mouth wash  Maalox Lidocaine 2% viscous  Diphenhydramine oral solution   Pharmacy to mix equal portions of ingredients to a total volume as indicated in the dispense amount. Swish and spit four times daily as needed for mouth sores. Mary Pen Needle 32 gauge x 5/32\" Ndle Generic drug:  Insulin Needles (Disposable) USE AS DIRECTED TWICE DAILY  
  
 naproxen 500 mg tablet Commonly known as:  NAPROSYN  
TAKE 1 TABLET BY MOUTH TWICE DAILY WITH MEALS NovoLOG Mix 70-30 FlexPen 100 unit/mL (70-30) Inpn Generic drug:  insulin aspart protamine/insulin aspart USE 15 UNITS BEFORE BREAKFAST AND DINNER One Touch Delica 33 gauge Misc Generic drug:  lancets TEST THREE TIMES DAILY  
  
 ONETOUCH ULTRA TEST strip Generic drug:  glucose blood VI test strips TEST 3 TIMES A DAY  
  
 tretinoin 0.025 % tpical gel * Notice: This list has 2 medication(s) that are the same as other medications prescribed for you. Read the directions carefully, and ask your doctor or other care provider to review them with you. We Performed the Following CBC WITH AUTOMATED DIFF [86343 CPT(R)] HEMOGLOBIN A1C WITH EAG [38055 CPT(R)]  DIABETES FOOT EXAM [7 Custom] LIPID PANEL [47918 CPT(R)] METABOLIC PANEL, COMPREHENSIVE [65425 CPT(R)] [place] COLLECTION VENOUS BLOOD,VENIPUNCTURE [id number] CPT(R)] TSH 3RD GENERATION [51175 CPT(R)] URINALYSIS W/ RFLX MICROSCOPIC [54580 CPT(R)] Follow-up Instructions Return in about 3 months (around 6/24/2017). To-Do List   
 04/04/2017 2:30 PM  
  Appointment with Amy Sheehan [facility] ([phone])  
  
 05/02/2017 2:30 PM  
 [facility] [address] ([phone])  
  
 05/30/2017 2:30 PM  
  [facility] [address] ([phone])  
  
 06/27/2017 2:30 PM  
  Appointment with [address] [facility] at [address] ([phone])  
  
 07/25/2017 2:30 PM  
  Appointment with [address] [facility] at [address] ([phone]) Introducing [facility]! Dear Yong Matson: 
Thank you for requesting a MTailor account. Our records indicate that you already have an active MTailor account. You can access your account anytime at [email or web address] Did you know that you can access your hospital and ER discharge instructions at any time in MTailor? You can also review all of your test results from your hospital stay or ER visit. Additional Information If you have questions, please visit the Frequently Asked Questions section of the MTailor website at [email or web address] Remember, MTailor is NOT to be used for urgent needs. For medical emergencies, dial 911. Now available from your iPhone and Android! Please provide this summary of care documentation to your next provider. Your primary care clinician is listed as August Beer. If you have any questions after today's visit, please call [phone].

## 2017-03-25 LAB
ALBUMIN SERPL-MCNC: 4.2 G/DL (ref 3.5–5.5)
ALBUMIN/GLOB SERPL: 1.7 {RATIO} (ref 1.2–2.2)
ALP SERPL-CCNC: 72 IU/L (ref 39–117)
ALT SERPL-CCNC: 12 IU/L (ref 0–32)
APPEARANCE UR: CLEAR
AST SERPL-CCNC: 15 IU/L (ref 0–40)
BACTERIA #/AREA URNS HPF: ABNORMAL /[HPF]
BASOPHILS # BLD AUTO: 0 X10E3/UL (ref 0–0.2)
BASOPHILS NFR BLD AUTO: 0 %
BILIRUB SERPL-MCNC: 0.4 MG/DL (ref 0–1.2)
BILIRUB UR QL STRIP: NEGATIVE
BUN SERPL-MCNC: 19 MG/DL (ref 6–24)
BUN/CREAT SERPL: 33 (ref 9–23)
CALCIUM SERPL-MCNC: 9.5 MG/DL (ref 8.7–10.2)
CASTS URNS QL MICRO: ABNORMAL /LPF
CHLORIDE SERPL-SCNC: 100 MMOL/L (ref 96–106)
CHOLEST SERPL-MCNC: 212 MG/DL (ref 100–199)
CO2 SERPL-SCNC: 21 MMOL/L (ref 18–29)
COLOR UR: YELLOW
CREAT SERPL-MCNC: 0.58 MG/DL (ref 0.57–1)
EOSINOPHIL # BLD AUTO: 0.1 X10E3/UL (ref 0–0.4)
EOSINOPHIL NFR BLD AUTO: 1 %
EPI CELLS #/AREA URNS HPF: ABNORMAL /HPF
ERYTHROCYTE [DISTWIDTH] IN BLOOD BY AUTOMATED COUNT: 15.4 % (ref 12.3–15.4)
EST. AVERAGE GLUCOSE BLD GHB EST-MCNC: 169 MG/DL
GLOBULIN SER CALC-MCNC: 2.5 G/DL (ref 1.5–4.5)
GLUCOSE SERPL-MCNC: 129 MG/DL (ref 65–99)
GLUCOSE UR QL: NEGATIVE
HBA1C MFR BLD: 7.5 % (ref 4.8–5.6)
HCT VFR BLD AUTO: 35 % (ref 34–46.6)
HDLC SERPL-MCNC: 51 MG/DL
HGB BLD-MCNC: 11.3 G/DL (ref 11.1–15.9)
HGB UR QL STRIP: ABNORMAL
IMM GRANULOCYTES # BLD: 0 X10E3/UL (ref 0–0.1)
IMM GRANULOCYTES NFR BLD: 0 %
KETONES UR QL STRIP: NEGATIVE
LDLC SERPL CALC-MCNC: 137 MG/DL (ref 0–99)
LEUKOCYTE ESTERASE UR QL STRIP: NEGATIVE
LYMPHOCYTES # BLD AUTO: 1.9 X10E3/UL (ref 0.7–3.1)
LYMPHOCYTES NFR BLD AUTO: 22 %
MCH RBC QN AUTO: 29.8 PG (ref 26.6–33)
MCHC RBC AUTO-ENTMCNC: 32.3 G/DL (ref 31.5–35.7)
MCV RBC AUTO: 92 FL (ref 79–97)
MICRO URNS: ABNORMAL
MONOCYTES # BLD AUTO: 0.6 X10E3/UL (ref 0.1–0.9)
MONOCYTES NFR BLD AUTO: 7 %
MUCOUS THREADS URNS QL MICRO: PRESENT
NEUTROPHILS # BLD AUTO: 5.9 X10E3/UL (ref 1.4–7)
NEUTROPHILS NFR BLD AUTO: 70 %
NITRITE UR QL STRIP: NEGATIVE
PH UR STRIP: 5.5 [PH] (ref 5–7.5)
PLATELET # BLD AUTO: 316 X10E3/UL (ref 150–379)
POTASSIUM SERPL-SCNC: 4.6 MMOL/L (ref 3.5–5.2)
PROT SERPL-MCNC: 6.7 G/DL (ref 6–8.5)
PROT UR QL STRIP: ABNORMAL
RBC # BLD AUTO: 3.79 X10E6/UL (ref 3.77–5.28)
RBC #/AREA URNS HPF: >30 /HPF
SODIUM SERPL-SCNC: 141 MMOL/L (ref 134–144)
SP GR UR: 1.03 (ref 1–1.03)
TRIGL SERPL-MCNC: 118 MG/DL (ref 0–149)
TSH SERPL DL<=0.005 MIU/L-ACNC: 1.48 UIU/ML (ref 0.45–4.5)
UROBILINOGEN UR STRIP-MCNC: 1 MG/DL (ref 0.2–1)
VLDLC SERPL CALC-MCNC: 24 MG/DL (ref 5–40)
WBC # BLD AUTO: 8.5 X10E3/UL (ref 3.4–10.8)
WBC #/AREA URNS HPF: ABNORMAL /HPF

## 2017-04-03 RX ORDER — HEPARIN 100 UNIT/ML
500 SYRINGE INTRAVENOUS AS NEEDED
Status: CANCELLED | OUTPATIENT
Start: 2017-04-03 | End: 2017-04-04

## 2017-04-03 RX ORDER — SODIUM CHLORIDE 9 MG/ML
10 INJECTION INTRAMUSCULAR; INTRAVENOUS; SUBCUTANEOUS AS NEEDED
Status: CANCELLED | OUTPATIENT
Start: 2017-04-03 | End: 2017-04-04

## 2017-04-03 RX ORDER — SODIUM CHLORIDE 0.9 % (FLUSH) 0.9 %
10-40 SYRINGE (ML) INJECTION AS NEEDED
Status: CANCELLED | OUTPATIENT
Start: 2017-04-03 | End: 2017-04-04

## 2017-04-04 ENCOUNTER — HOSPITAL ENCOUNTER (OUTPATIENT)
Dept: INFUSION THERAPY | Age: 47
Discharge: HOME | End: 2017-04-04

## 2017-04-13 ENCOUNTER — SURGERY (OUTPATIENT)
Age: 47
End: 2017-04-13

## 2017-04-13 ENCOUNTER — HOSPITAL ENCOUNTER (OUTPATIENT)
Age: 47
Setting detail: OUTPATIENT SURGERY
Discharge: HOME | End: 2017-04-13
Attending: SURGERY | Admitting: SURGERY
Payer: COMMERCIAL

## 2017-04-13 VITALS
RESPIRATION RATE: 20 BRPM | WEIGHT: 242.95 LBS | HEIGHT: 61 IN | OXYGEN SATURATION: 100 % | BODY MASS INDEX: 45.87 KG/M2 | HEART RATE: 89 BPM | DIASTOLIC BLOOD PRESSURE: 68 MMHG | SYSTOLIC BLOOD PRESSURE: 116 MMHG | TEMPERATURE: 98 F

## 2017-04-13 DIAGNOSIS — C50.212 MALIGNANT NEOPLASM OF UPPER-INNER QUADRANT OF LEFT FEMALE BREAST (HCC): ICD-10-CM

## 2017-04-13 LAB
GLUCOSE BLD STRIP.AUTO-MCNC: 138 MG/DL (ref 65–100)
SERVICE CMNT-IMP: ABNORMAL

## 2017-04-13 PROCEDURE — 77030018836 HC SOL IRR NACL ICUM -A: Performed by: SURGERY

## 2017-04-13 PROCEDURE — 77030002933 HC SUT MCRYL J&J -A: Performed by: SURGERY

## 2017-04-13 PROCEDURE — 82962 GLUCOSE BLOOD TEST: CPT

## 2017-04-13 PROCEDURE — 77030031139 HC SUT VCRL2 J&J -A: Performed by: SURGERY

## 2017-04-13 PROCEDURE — 74011000250 HC RX REV CODE- 250: Performed by: SURGERY

## 2017-04-13 PROCEDURE — 77030011640 HC PAD GRND REM COVD -A: Performed by: SURGERY

## 2017-04-13 PROCEDURE — 77030010507 HC ADH SKN DERMBND J&J -B: Performed by: SURGERY

## 2017-04-13 PROCEDURE — 74011000258 HC RX REV CODE- 258: Performed by: SURGERY

## 2017-04-13 PROCEDURE — 76210000050 HC AMBSU PH II REC 0.5 TO 1 HR: Performed by: SURGERY

## 2017-04-13 PROCEDURE — 76030000000 HC AMB SURG OR TIME 0.5 TO 1: Performed by: SURGERY

## 2017-04-13 PROCEDURE — 77030011267 HC ELECTRD BLD COVD -A: Performed by: SURGERY

## 2017-04-13 RX ORDER — BUPIVACAINE HYDROCHLORIDE AND EPINEPHRINE 5; 5 MG/ML; UG/ML
30 INJECTION, SOLUTION EPIDURAL; INTRACAUDAL; PERINEURAL ONCE
Status: DISCONTINUED | OUTPATIENT
Start: 2017-04-13 | End: 2017-04-13 | Stop reason: HOSPADM

## 2017-04-13 RX ORDER — LIDOCAINE HYDROCHLORIDE AND EPINEPHRINE 10; 10 MG/ML; UG/ML
30 INJECTION, SOLUTION INFILTRATION; PERINEURAL ONCE
Status: DISCONTINUED | OUTPATIENT
Start: 2017-04-13 | End: 2017-04-13 | Stop reason: HOSPADM

## 2017-04-13 RX ORDER — HYDROCODONE BITARTRATE AND ACETAMINOPHEN 5; 325 MG/1; MG/1
1 TABLET ORAL
Qty: 25 TAB | Refills: 0 | Status: SHIPPED | OUTPATIENT
Start: 2017-04-13 | End: 2017-07-17

## 2017-04-13 RX ADMIN — BUPIVACAINE HYDROCHLORIDE AND EPINEPHRINE BITARTRATE 5 ML: 5; .005 INJECTION, SOLUTION EPIDURAL; INTRACAUDAL; PERINEURAL at 07:42

## 2017-04-13 NOTE — IP AVS SNAPSHOT
Current Discharge Medication List  
  
CONTINUE these medications which have CHANGED Dose & Instructions Dispensing Information Comments Morning Noon Evening Bedtime  
 dicyclomine 10 mg capsule Commonly known as:  BENTYL What changed:  additional instructions Your last dose was: Your next dose is:    
   
   
 Dose:  20 mg Take 2 Caps by mouth four (4) times daily. Quantity:  120 Cap Refills:  11  
     
   
   
   
  
 * HYDROcodone-acetaminophen 7.5-325 mg per tablet Commonly known as:  Miryam Cruise What changed:  Another medication with the same name was added. Make sure you understand how and when to take each. Your last dose was: Your next dose is:    
   
   
 Dose:  1 Tab Take 1 Tab by mouth every four (4) hours as needed for Pain. Max Daily Amount: 6 Tabs. Quantity:  35 Tab Refills:  0  
     
   
   
   
  
 * HYDROcodone-acetaminophen 5-325 mg per tablet Commonly known as:  Miryam Cruise What changed: You were already taking a medication with the same name, and this prescription was added. Make sure you understand how and when to take each. Your last dose was: Your next dose is:    
   
   
 Dose:  1 Tab Take 1 Tab by mouth every four (4) hours as needed for Pain. Max Daily Amount: 6 Tabs. Quantity:  25 Tab Refills:  0  
     
   
   
   
  
 * Notice: This list has 2 medication(s) that are the same as other medications prescribed for you. Read the directions carefully, and ask your doctor or other care provider to review them with you. CONTINUE these medications which have NOT CHANGED Dose & Instructions Dispensing Information Comments Morning Noon Evening Bedtime  
 acetaminophen 325 mg tablet Commonly known as:  TYLENOL Your last dose was: Your next dose is:    
   
   
 Dose:  650 mg Take 650 mg by mouth every four (4) hours as needed for Pain. Refills:  0 * albuterol 90 mcg/actuation inhaler Commonly known as:  PROVENTIL HFA, VENTOLIN HFA, PROAIR HFA Your last dose was: Your next dose is:    
   
   
 Dose:  2 Puff Take 2 Puffs by inhalation every four (4) hours as needed for Wheezing. Quantity:  1 Inhaler Refills:  11  
     
   
   
   
  
 * albuterol 2.5 mg /3 mL (0.083 %) nebulizer solution Commonly known as:  PROVENTIL VENTOLIN Your last dose was: Your next dose is:    
   
   
  Refills:  0  
     
   
   
   
  
 buPROPion  mg SR tablet Commonly known as:  Sandrita Reel Your last dose was: Your next dose is: TAKE 1 TABLET BY MOUTH TWICE DAILY Quantity:  30 Tab Refills:  3 diphenoxylate-atropine 2.5-0.025 mg per tablet Commonly known as:  LOMOTIL Your last dose was: Your next dose is:    
   
   
 Dose:  1 Tab Take 1 Tab by mouth four (4) times daily as needed for Diarrhea. Max Daily Amount: 4 Tabs. Quantity:  30 Tab Refills:  1 FINACEA 15 % topical gel Generic drug:  azelaic acid Your last dose was: Your next dose is:    
   
   
  Refills:  5  
     
   
   
   
  
 hydroquinone 2 % topical cream  
   
Your last dose was: Your next dose is:    
   
   
 Apply  to affected area two (2) times a day. Refills:  0  
     
   
   
   
  
 magic mouthwash solution Your last dose was: Your next dose is:    
   
   
 Magic mouth wash  Maalox Lidocaine 2% viscous  Diphenhydramine oral solution   Pharmacy to mix equal portions of ingredients to a total volume as indicated in the dispense amount. Swish and spit four times daily as needed for mouth sores. Quantity:  240 mL Refills:  1 Mary Pen Needle 32 gauge x 5/32\" Ndle Generic drug:  Insulin Needles (Disposable) Your last dose was: Your next dose is: USE AS DIRECTED TWICE DAILY Quantity:  100 Pen Needle Refills:  11  
     
   
   
   
  
 naproxen 500 mg tablet Commonly known as:  NAPROSYN Your last dose was: Your next dose is: TAKE 1 TABLET BY MOUTH TWICE DAILY WITH MEALS Quantity:  60 Tab Refills:  1 NovoLOG Mix 70-30 FlexPen 100 unit/mL (70-30) Inpn Generic drug:  insulin aspart protamine/insulin aspart Your last dose was: Your next dose is:    
   
   
 USE 15 UNITS BEFORE BREAKFAST AND DINNER Quantity:  15 mL Refills:  11 One Touch Delica 33 gauge Misc Generic drug:  lancets Your last dose was: Your next dose is:    
   
   
 TEST THREE TIMES DAILY Quantity:  100 Lancet Refills:  11 ONETOUCH ULTRA TEST strip Generic drug:  glucose blood VI test strips Your last dose was: Your next dose is:    
   
   
 TEST 3 TIMES A DAY Quantity:  100 Strip Refills:  11  
     
   
   
   
  
 tretinoin 0.025 % tpical gel Your last dose was: Your next dose is:    
   
   
  Refills:  3  
     
   
   
   
  
 * Notice: This list has 2 medication(s) that are the same as other medications prescribed for you. Read the directions carefully, and ask your doctor or other care provider to review them with you. ASK your doctor about these medications Dose & Instructions Dispensing Information Comments Morning Noon Evening Bedtime VICTOZA 2-HUGO SC Your last dose was: Your next dose is:    
   
   
 Dose:  1.8 mg  
1.8 mg by SubCUTAneous route daily. Refills:  0 Where to Get Your Medications Information on where to get these meds will be given to you by the nurse or doctor. ! Ask your nurse or doctor about these medications HYDROcodone-acetaminophen 5-325 mg per tablet

## 2017-04-13 NOTE — ROUTINE PROCESS
Patient: Emily Bach MRN: [id number]  SSN: [id number]   YOB: 1970  Age: 51 y.o. Sex: female     Patient is status post Procedure(s): INFUSAPORT REMOVAL. Surgeon(s) and Role:     * Tiffani Banks MD - Primary    Local/Dose/Irrigation:  See mar              Venous Access Device Port/frost 11/17/16 Upper chest (subclavicular area, right (Active)      Peripheral IV 08/12/16 Left Antecubital (Active)       Peripheral IV 11/17/16 Right; Inner Wrist (Active)          Rosendo-Rob Drain 10/13/16 Left Breast (Active)       Rosendo-Rob Drain 10/13/16 Right Breast (Active)                     Dressing/Packing:  Wound Chest Right-DRESSING TYPE:  (dermabond) (04/13/17 0700)  Splint/Cast:  ]    Other:

## 2017-04-13 NOTE — H&P
HISTORY OF PRESENT ILLNESS  Caroline Wolff is a 51 y.o. female. HPI  NEW patient referral for consultation by Ginger Horowitz. Patient had abnormal mammogram in 6/2016. Patient denies any nipple discharge/inversion and denies any palpable lumps.      No family history breast/ovarian cancer     Mammogram / LEFT diagnostic / ultrasound - 6/21/2016 - BI RADS 4 LEFT breast persistent focal asymmetry.          Past Medical History   Diagnosis Date    Asthma      Diabetes ([address]      Obesity      Sinusitis      IBS (irritable bowel syndrome)      Stress at home      Noncompliance      Dyslipidemia      HTN (hypertension)      S/P breast biopsy, left 8/1/16                Past Surgical History   Procedure Laterality Date    Hx hysterectomy           full         History            Social History    Marital Status: SINGLE       Spouse Name: N/A    Number of Children: N/A    Years of Education: N/A          Occupational History    Not on file.           Social History Main Topics    Smoking status: Never Smoker     Smokeless tobacco: Not on file    Alcohol Use: No    Drug Use: No    Sexual Activity: Not on file           Other Topics Concern    Not on file          Social History Narrative     ** Merged History Encounter **                       Current Outpatient Prescriptions on File Prior to Visit   Medication Sig Dispense Refill    buPROPion SR (WELLBUTRIN SR) 150 mg SR tablet TAKE 1 TABLET BY MOUTH TWICE DAILY 30 Tab 5    naproxen (NAPROSYN) 500 mg tablet TAKE 1 TABLET BY MOUTH TWICE TIMES DAILY WITH MEALS 60 Tab 5    VICTOZA 2-HUGO 0.6 mg/0.1 mL (18 mg/3 mL) sub-q pen INJECT 0.3 ML(1.8MG) UNDER THE SKIN EVERY DAY 6 mL 11    dicyclomine (BENTYL) 10 mg capsule Take 2 Caps by mouth four (4) times daily.  120 Cap 11    NOVOLOG MIX 70-30 FLEXPEN 100 unit/mL (70-30) inpn USE 15 UNITS BEFORE BREAKFAST AND DINNER 15 mL 11    KELLY PEN NEEDLE 32 gauge x 5/32\" ndle USE AS DIRECTED TWICE DAILY 100 Pen Needle 11    pravastatin (PRAVACHOL) 40 mg tablet Take 1 Tab by mouth nightly. 30 Tab 11    albuterol (PROVENTIL VENTOLIN) 2.5 mg /3 mL (0.083 %) nebulizer solution     11    FINACEA 15 % topical gel     5    DIVIGEL 1 mg (0.1 %) glpk     6    ONE TOUCH DELICA 33 gauge misc     11    tretinoin 0.025 % tpical gel     3    albuterol (PROVENTIL HFA, VENTOLIN HFA, PROAIR HFA) 90 mcg/actuation inhaler Take 2 Puffs by inhalation every four (4) hours as needed for Wheezing. 1 Inhaler 11    albuterol sulfate (PROVENTIL;VENTOLIN) 2.5 mg/0.5 mL nebu nebulizer solution 0.5 mL by Nebulization route every four (4) hours as needed for Wheezing. 150 mL 11    Lancets misc Test three times daily 100 Each 11    glucose blood VI test strips (ASCENSIA AUTODISC VI, ONE TOUCH ULTRA TEST VI) strip Test three times daily 100 Strip 11      No current facility-administered medications on file prior to visit.              Allergies   Allergen Reactions    Codeine Diarrhea    Theophyl Diarrhea and Nausea and Vomiting    Theophylline Diarrhea             OB History      No data available             ROS  Constitutional: Positive for diaphoresis. HENT: Negative. Eyes: Negative. Respiratory: Negative. Cardiovascular: Positive for leg swelling. Gastrointestinal: Positive for nausea, vomiting, diarrhea and constipation. Genitourinary: Negative. Musculoskeletal: Positive for myalgias and joint pain. Neurological: Negative. Endo/Heme/Allergies: Negative. Psychiatric/Behavioral: Negative.         Physical Exam   Cardiovascular: Normal rate and normal heart sounds. Pulmonary/Chest: Breath sounds normal. Right breast exhibits no inverted nipple, no mass, no nipple discharge, no skin change and no tenderness. Left breast exhibits no inverted nipple, no mass, no nipple discharge, no skin change and no tenderness.  Breasts are symmetrical.   Lymphadenopathy:   Right cervical: No superficial cervical, no deep cervical and no posterior cervical adenopathy present. Left cervical: No superficial cervical, no deep cervical and no posterior cervical adenopathy present. Right axillary: No pectoral and no lateral adenopathy present. Left axillary: No pectoral and no lateral adenopathy present.        Stereotactic Biopsy  PROCEDURE : LORAD STEREOTACTIC VACUUM ASSISTED BIOPSY AND RELATED DIGITAL MAMMOGRAPHY OF THE, left BREAST. INDICATION : DENSITY. CONSENT : Following a detailed description of the LORAD stereotactic core biopsy procedure, its risks, benefits and possible alternatives (open biopsy), the patient signed the informed consent. The risks and benefits of the procedure have been explained to the patient by the stereotactic technologist and Dr. Reba Haney. IMAGING : Prebiopsy digital stereotactic imaging of the breast were obtained and confirmed the presence of the abnormality in both  and stereotactic views. , The left Breast was imaged. PROJECTION : CC.   BIOPSY PROCEDURE : Following sterile preparation of the skin, a cutaneous skin wheel of 1% lidocaine was use as a local anesthetic. a 4mm skin incision was made for the entry site of the biopsy needle. Prefire stereotactic images were obtained to confirm accurate targeting., An 7 gauge Encor needle was used for the biopsy, 7 biopsy specimens were obtained. CLIP PLACEMENT : A marking clip was placed for future mammographic reference and position was confirmed with a 0 degree postfire image. . . FINAL PATHOLOGY : Is pending at this time. Vineet Beltrán POST BIOPSY MAMMOGRAM : CLIP IN GOOD POSITION.       ASSESSMENT and PLAN      ICD-10-CM ICD-9-CM     1.  Abnormal mammogram of left breast R92.8 793.80        S/p adjuvant chemotherapy for port removal

## 2017-04-13 NOTE — OP NOTES
[address] [facility], [address]   OP NOTE       Name:  Barby Buck   MR#:  [id number]   :  1970   Account #:  [de-identified]    Surgery Date:  2017   Date of Adm:  2017       PREOPERATIVE DIAGNOSIS: Carcinoma of the breast, status post   adjuvant chemotherapy. POSTOPERATIVE DIAGNOSIS: Carcinoma of the breast, status post   adjuvant chemotherapy. PROCEDURES PERFORMED: Port-A-Cath removal.    SURGEON: Dhaval Topete. Jani Manley MD     ANESTHESIA: Local.    SPECIMENS REMOVED: None. ESTIMATED BLOOD LOSS: Minimal.    INDICATIONS: The patient is a 41-year-old female who has completed   adjuvant chemotherapy for breast cancer, who presents for Port-A-  Cath removal.    DESCRIPTION OF PROCEDURE: After sterile prep and drape, local   anesthesia using 1% lidocaine mixed with 0.5% Marcaine, the Port-A-  Cath incision was opened. Port-A-Cath was identified. It was removed   and noted to be intact. The wound was hemostatic. It was closed with   interrupted 3-0 Vicryl and running subcuticular 4-0 Monocryl on skin. The patient tolerated the procedure well without complications. She   was taken to the recovery room in stable condition. Isrrael Bullock.MD AVENDAÑO / SERGIO   D:  2017   07:55   T:  2017   08:12   Job #:  [id number]     DR. GALAN, [facility] [place]

## 2017-04-13 NOTE — IP AVS SNAPSHOT
[address] 
[phone] Patient: Saurabh Issa MRN: [id number] :1970 You are allergic to the following Allergen Reactions Codeine Diarrhea Pravastatin Myalgia Terrible leg aching. Theophyl Diarrhea Nausea and Vomiting Theophylline Diarrhea Recent Documentation Height Weight BMI OB Status Smoking Status 1.549 m 110.2 kg 45.9 kg/m2 Hysterectomy Never Smoker Emergency Contacts Name Discharge Info Relation Home Work Mobile [facility] DISCHARGE CAREGIVER [3] Mother [14] [phone] [place]  Parent [1] [phone] About your hospitalization You were admitted on:  2017 You last received care in the:  [facility] PACU You were discharged on:  2017 Unit phone number:  [phone] Why you were hospitalized Your primary diagnosis was:  Not on File Providers Seen During Your Hospitalizations Provider Role Specialty Primary office phone Cynthia Hollins MD Attending Provider Breast Surgery [phone] Your Primary Care Physician (PCP) Primary Care Physician Office Phone Office Fax Vielka Tang [phone] Follow-up Information Follow up With Details Comments Contact Info Fadi Terry MD   [facility] [address] [facility] 
[phone] Current Discharge Medication List  
  
CONTINUE these medications which have CHANGED Dose & Instructions Dispensing Information Comments Morning Noon Evening Bedtime  
 dicyclomine 10 mg capsule Commonly known as:  BENTYL What changed:  additional instructions Your last dose was: Your next dose is:    
   
   
 Dose:  20 mg Take 2 Caps by mouth four (4) times daily. Quantity:  120 Cap Refills:  11  
     
 * HYDROcodone-acetaminophen 7.5-325 mg per tablet Commonly known as:  Kash Brandon What changed:  Another medication with the same name was added. Make sure you understand how and when to take each. Your last dose was: Your next dose is:    
   
   
 Dose:  1 Tab Take 1 Tab by mouth every four (4) hours as needed for Pain. Max Daily Amount: 6 Tabs. Quantity:  35 Tab Refills:  0  
     
   
   
   
  
 * HYDROcodone-acetaminophen 5-325 mg per tablet Commonly known as:  Kash Brandon What changed: You were already taking a medication with the same name, and this prescription was added. Make sure you understand how and when to take each. Your last dose was: Your next dose is:    
   
   
 Dose:  1 Tab Take 1 Tab by mouth every four (4) hours as needed for Pain. Max Daily Amount: 6 Tabs. Quantity:  25 Tab Refills:  0  
     
   
   
   
  
 * Notice: This list has 2 medication(s) that are the same as other medications prescribed for you. Read the directions carefully, and ask your doctor or other care provider to review them with you. CONTINUE these medications which have NOT CHANGED Dose & Instructions Dispensing Information Comments Morning Noon Evening Bedtime  
 acetaminophen 325 mg tablet Commonly known as:  TYLENOL Your last dose was: Your next dose is:    
   
   
 Dose:  650 mg Take 650 mg by mouth every four (4) hours as needed for Pain. Refills:  0  
     
   
   
   
  
 * albuterol 90 mcg/actuation inhaler Commonly known as:  PROVENTIL HFA, VENTOLIN HFA, PROAIR HFA Your last dose was: Your next dose is:    
   
   
 Dose:  2 Puff Take 2 Puffs by inhalation every four (4) hours as needed for Wheezing. Quantity:  1 Inhaler Refills:  11  
     
   
   
   
  
 * albuterol 2.5 mg /3 mL (0.083 %) nebulizer solution Commonly known as:  PROVENTIL VENTOLIN Your last dose was: Your next dose is:    
   
   
  Refills:  0  
     
   
   
   
  
 buPROPion  mg SR tablet Commonly known as:  Michael Segal Your last dose was: Your next dose is: TAKE 1 TABLET BY MOUTH TWICE DAILY Quantity:  30 Tab Refills:  3 diphenoxylate-atropine 2.5-0.025 mg per tablet Commonly known as:  LOMOTIL Your last dose was: Your next dose is:    
   
   
 Dose:  1 Tab Take 1 Tab by mouth four (4) times daily as needed for Diarrhea. Max Daily Amount: 4 Tabs. Quantity:  30 Tab Refills:  1 FINACEA 15 % topical gel Generic drug:  azelaic acid Your last dose was: Your next dose is:    
   
   
  Refills:  5  
     
   
   
   
  
 hydroquinone 2 % topical cream  
   
Your last dose was: Your next dose is:    
   
   
 Apply  to affected area two (2) times a day. Refills:  0  
     
   
   
   
  
 magic mouthwash solution Your last dose was: Your next dose is:    
   
   
 Magic mouth wash  Maalox Lidocaine 2% viscous  Diphenhydramine oral solution   Pharmacy to mix equal portions of ingredients to a total volume as indicated in the dispense amount. Swish and spit four times daily as needed for mouth sores. Quantity:  240 mL Refills:  1 Mary Pen Needle 32 gauge x 5/32\" Ndle Generic drug:  Insulin Needles (Disposable) Your last dose was: Your next dose is: USE AS DIRECTED TWICE DAILY Quantity:  100 Pen Needle Refills:  11  
     
   
   
   
  
 naproxen 500 mg tablet Commonly known as:  NAPROSYN Your last dose was: Your next dose is: TAKE 1 TABLET BY MOUTH TWICE DAILY WITH MEALS Quantity:  60 Tab Refills:  1 NovoLOG Mix 70-30 FlexPen 100 unit/mL (70-30) Inpn Generic drug:  insulin aspart protamine/insulin aspart Your last dose was: Your next dose is:    
   
   
 USE 15 UNITS BEFORE BREAKFAST AND DINNER Quantity:  15 mL Refills:  11 One Touch Delica 33 gauge Misc Generic drug:  lancets Your last dose was: Your next dose is:    
   
   
 TEST THREE TIMES DAILY Quantity:  100 Lancet Refills:  11 ONETOUCH ULTRA TEST strip Generic drug:  glucose blood VI test strips Your last dose was: Your next dose is:    
   
   
 TEST 3 TIMES A DAY Quantity:  100 Strip Refills:  11  
     
   
   
   
  
 tretinoin 0.025 % tpical gel Your last dose was: Your next dose is:    
   
   
  Refills:  3  
     
   
   
   
  
 * Notice: This list has 2 medication(s) that are the same as other medications prescribed for you. Read the directions carefully, and ask your doctor or other care provider to review them with you. ASK your doctor about these medications Dose & Instructions Dispensing Information Comments Morning Noon Evening Bedtime VICTOZA 2-HUGO SC Your last dose was: Your next dose is:    
   
   
 Dose:  1.8 mg  
1.8 mg by SubCUTAneous route daily. Refills:  0 Where to Get Your Medications Information on where to get these meds will be given to you by the nurse or doctor. ! Ask your nurse or doctor about these medications HYDROcodone-acetaminophen 5-325 mg per tablet Discharge Instructions May shower, no heavy lifting today, ice as needed. Follow up in 3 months. Call to make appt. Discharge Orders None Introducing [facility] SERVICES! Dear Ole Hatch: 
Thank you for requesting a Huddle account. Our records indicate that you already have an active Huddle account. You can access your account anytime at [email or web address] Did you know that you can access your hospital and ER discharge instructions at any time in MyChart? You can also review all of your test results from your hospital stay or ER visit. Additional Information If you have questions, please visit the Frequently Asked Questions section of the [employer] website at [email or web address] Remember, MyChart is NOT to be used for urgent needs. For medical emergencies, dial 911. Now available from your iPhone and Android! General Information Please provide this summary of care documentation to your next provider. Patient Signature:  ____________________________________________________________ Date:  ____________________________________________________________  
  
Pham Sylvia Provider Signature:  ____________________________________________________________ Date:  ____________________________________________________________

## 2017-04-13 NOTE — BRIEF OP NOTE
BRIEF OPERATIVE NOTE    Date of Procedure: 4/13/2017   Preoperative Diagnosis: BREAST CANCER  Postoperative Diagnosis: BREAST CANCER    Procedure(s):   INFUSAPORT REMOVAL  Surgeon(s) and Role:     * Maritza Chau MD - Primary            Surgical Staff:  Circ-1: Aliyah Gastelum RN  Local Nurse Monitor: Arlen Mccray RN  Scrub RN-1: Willie Vázquez RN  Event Time In   Incision Start 0242   Incision Close      Anesthesia: Local   Estimated Blood Loss: minimal  Specimens: * No specimens in log *   Findings: intact portacath   Complications: none  Implants: * No implants in log *

## 2017-04-14 DIAGNOSIS — J30.89 ENVIRONMENTAL AND SEASONAL ALLERGIES: Primary | ICD-10-CM

## 2017-04-14 RX ORDER — CETIRIZINE HCL 10 MG
10 TABLET ORAL DAILY
Qty: 60 TAB | Refills: 1 | Status: SHIPPED | OUTPATIENT
Start: 2017-04-14 | End: 2017-08-10 | Stop reason: SDUPTHER

## 2017-05-02 ENCOUNTER — APPOINTMENT (OUTPATIENT)
Dept: INFUSION THERAPY | Age: 47
End: 2017-05-02

## 2017-05-30 ENCOUNTER — APPOINTMENT (OUTPATIENT)
Dept: INFUSION THERAPY | Age: 47
End: 2017-05-30

## 2017-06-19 ENCOUNTER — OFFICE VISIT (OUTPATIENT)
Dept: ONCOLOGY | Age: 47
End: 2017-06-19

## 2017-06-19 ENCOUNTER — NURSE NAVIGATOR (OUTPATIENT)
Dept: ONCOLOGY | Age: 47
End: 2017-06-19

## 2017-06-19 VITALS
DIASTOLIC BLOOD PRESSURE: 80 MMHG | TEMPERATURE: 98.7 F | BODY MASS INDEX: 46.14 KG/M2 | SYSTOLIC BLOOD PRESSURE: 122 MMHG | HEART RATE: 106 BPM | WEIGHT: 244.4 LBS | HEIGHT: 61 IN | OXYGEN SATURATION: 98 % | RESPIRATION RATE: 16 BRPM

## 2017-06-19 DIAGNOSIS — Z17.1 STAGE 2 CARCINOMA OF BREAST, ER-, LEFT (HCC): Primary | ICD-10-CM

## 2017-06-19 DIAGNOSIS — G62.0 CHEMOTHERAPY-INDUCED NEUROPATHY (HCC): ICD-10-CM

## 2017-06-19 DIAGNOSIS — M25.60 JOINT STIFFNESS: ICD-10-CM

## 2017-06-19 DIAGNOSIS — C50.912 STAGE 2 CARCINOMA OF BREAST, ER-, LEFT (HCC): Primary | ICD-10-CM

## 2017-06-19 DIAGNOSIS — T45.1X5A CHEMOTHERAPY-INDUCED NEUROPATHY (HCC): ICD-10-CM

## 2017-06-19 DIAGNOSIS — Z98.890 S/P LUMPECTOMY, LEFT BREAST: ICD-10-CM

## 2017-06-19 RX ORDER — GABAPENTIN 300 MG/1
300 CAPSULE ORAL
Qty: 30 CAP | Refills: 0 | Status: SHIPPED | OUTPATIENT
Start: 2017-06-19 | End: 2017-07-27

## 2017-06-19 NOTE — PROGRESS NOTES
Bharat Ramos is a 49 y.o. female here today for breast cancer f/u. Patient states she has 4/10 bilat hips and leg pain. Elevated pulse; states d/t walk from parking lot and stairs.

## 2017-06-19 NOTE — PROGRESS NOTES
HEME/ONC PROGRESS NOTE       Torrie Carbajal is a 49 y.o. 1970 female and presents with Follow-up and Breast Cancer    CC  Triple neg left breast cancer 8/16    HPI:  Pt seen today for office  3mo f/u for triple negative breast cancer not on any therapy from here. Pt is doing well overall. Done XRT 4/17   Pt still has neuropathy same. Joined a FB group called breast cancer sisters on FB. Interested in massage here. Last visit:  done TAC chemo x 4. Pt still has some neuropathy from chemo/ leg tightness/ stiffnes also. advil helps well. Pt is doing good overall. Pt is doing XRT 6/30 treatments now/ doing at [address] [place] [address]. Having breast ultrasound on thurs at [address] [place] [address] per rad/onc. Ready to get port out. DX   Encounter Diagnoses   Name Primary?  Stage 2 carcinoma of breast, ER-, left Yes    S/P lumpectomy, left breast     Chemotherapy-induced neuropathy (HCC)     Joint stiffness         STAGE: T2N0 triple neg    TREATMENT COURSE: lumpectomy 10/16 with b/l reduction, Adjuvant chemotherapy with TAC x 4, stopped due to peripheral neuropathy  Radiation 3/17 at [address] [place] [address].      Past Medical History:   Diagnosis Date    Arthritis     Asthma     Cancer ([facility]     BREAST left    Chronic pain     Diabetes ([facility]     Dyslipidemia     IBS (irritable bowel syndrome)     Nausea & vomiting     Noncompliance     Obesity     Psychiatric disorder     S/P breast biopsy, left 8/1/16    Sinusitis     Stress at home      Past Surgical History:   Procedure Laterality Date    HX BREAST LUMPECTOMY Left 10/13/2016    LEFT BREAST REDUCTION LUMPECTOMY, LEFT SENTINEL NODE BIOPSY, LEFT BRACKETED NEEDLE LOCALIZATION, LEFT BREAST RECONSTRUCTION performed by Lorenzo Orozco MD at [address] [place] [address] HX BREAST RECONSTRUCTION Bilateral 10/13/2016    BREAST REDUCTION performed by Rodrigo Callahan MD at [facility]      X2    HX GYN      ABLATION    HX HERNIA REPAIR      AS INFANT    HX HYSTERECTOMY      full    HX ORTHOPAEDIC      L THUMB     Social History     Social History    Marital status: SINGLE     Spouse name: N/A    Number of children: N/A    Years of education: N/A     Social History Main Topics    Smoking status: Never Smoker    Smokeless tobacco: Never Used    Alcohol use No    Drug use: No    Sexual activity: Not Asked     Other Topics Concern    None     Social History Narrative    ** Merged History Encounter **          Family History   Problem Relation Age of Onset    Heart Disease Mother     Hypertension Mother     Heart Disease Father     Seizures Sister     Anesth Problems Neg Hx        Current Outpatient Prescriptions   Medication Sig Dispense Refill    gabapentin (NEURONTIN) 300 mg capsule Take 1 Cap by mouth nightly. Indications: NEUROPATHIC PAIN 30 Cap 0    buPROPion SR (WELLBUTRIN SR) 150 mg SR tablet TAKE 1 TABLET BY MOUTH TWICE DAILY 30 Tab 11    cetirizine (ZYRTEC) 10 mg tablet Take 1 Tab by mouth daily. 60 Tab 1    LIRAGLUTIDE (VICTOZA 2-HUGO SC) 1.8 mg by SubCUTAneous route daily.  naproxen (NAPROSYN) 500 mg tablet TAKE 1 TABLET BY MOUTH TWICE DAILY WITH MEALS 60 Tab 1    ONE TOUCH DELICA 33 gauge misc TEST THREE TIMES DAILY 100 Lancet 11    ONETOUCH ULTRA TEST strip TEST 3 TIMES A  Strip 11    albuterol (PROVENTIL VENTOLIN) 2.5 mg /3 mL (0.083 %) nebulizer solution       dicyclomine (BENTYL) 10 mg capsule Take 2 Caps by mouth four (4) times daily. (Patient taking differently: Take 20 mg by mouth four (4) times daily.  Patient stated she usually takes 10mg twice daily) 120 Cap 11    NOVOLOG MIX 70-30 FLEXPEN 100 unit/mL (70-30) inpn USE 15 UNITS BEFORE BREAKFAST AND DINNER 15 mL 11    KELLY PEN NEEDLE 32 gauge x 5/32\" ndle USE AS DIRECTED TWICE DAILY 100 Pen Needle 11    FINACEA 15 % topical gel   5    tretinoin 0.025 % tpical gel   3    albuterol (PROVENTIL HFA, VENTOLIN HFA, PROAIR HFA) 90 mcg/actuation inhaler Take 2 Puffs by inhalation every four (4) hours as needed for Wheezing. 1 Inhaler 11    HYDROcodone-acetaminophen (NORCO) 5-325 mg per tablet Take 1 Tab by mouth every four (4) hours as needed for Pain. Max Daily Amount: 6 Tabs. 25 Tab 0    magic mouthwash solution Magic mouth wash   Maalox  Lidocaine 2% viscous   Diphenhydramine oral solution     Pharmacy to mix equal portions of ingredients to a total volume as indicated in the dispense amount. Swish and spit four times daily as needed for mouth sores. 240 mL 1    diphenoxylate-atropine (LOMOTIL) 2.5-0.025 mg per tablet Take 1 Tab by mouth four (4) times daily as needed for Diarrhea. Max Daily Amount: 4 Tabs. 30 Tab 1    HYDROcodone-acetaminophen (NORCO) 7.5-325 mg per tablet Take 1 Tab by mouth every four (4) hours as needed for Pain. Max Daily Amount: 6 Tabs. 35 Tab 0    acetaminophen (TYLENOL) 325 mg tablet Take 650 mg by mouth every four (4) hours as needed for Pain.  hydroquinone 2 % topical cream Apply  to affected area two (2) times a day. Allergies   Allergen Reactions    Codeine Diarrhea    Pravastatin Myalgia     Terrible leg aching.  Theophyl Diarrhea and Nausea and Vomiting    Theophylline Diarrhea       Review of Systems    A comprehensive review of systems was negative except for: per HPI     Objective:  Visit Vitals    /80 (BP 1 Location: Right arm, BP Patient Position: Sitting)    Pulse (!) 106    Temp 98.7 °F (37.1 °C) (Oral)    Resp 16    Ht 5' 1\" (1.549 m)    Wt 244 lb 6.4 oz (110.9 kg)    SpO2 98%    BMI 46.18 kg/m2       Physical Exam:   General appearance - alert, well appearing, and in no distress, oriented to person, place, and time and overweight  Mental status - alert, oriented to person, place, and time, normal mood, behavior, speech, dress, motor activity, and thought processes  EYE-conj clear  Mouth - mucous membranes pink, moist, intact. No lesions or thrush.   Neck - supple  Chest - clear to auscultation bilaterally  Heart - normal rate and regular rhythm   Abdomen - round, soft, nontender  Ext-no pedal edema noted bilaterally  Skin-Warm and dry. Intact without rash. Neuro -alert, oriented, normal speech, no focal findings or movement disorder noted  Breast no masses on right/  Lump at surgical scar on left/ minimal XRT changes    Diagnostic Imaging   reviewed  [facility] Results (most recent):    Results from Hospital Encounter encounter on 10/13/16   [facility] NDL/WIRE/CLIP/SEED BREAST LT   Narrative Clinical History:  79-year-old female with left breast cancer, presenting for  needle localization prior to surgical excision . Comparison and correlation has been performed with recent MRI as well as  mammography, both from August, 2016. Procedure:  Bracketing wire mammographic guided wire localization utilizing 2  wires    Technical Description:  The risks, benefits, and alternatives of needle  localization were discussed with the patient who gave verbal and written  consent. The skin was prepped with chloraprep. Local lidocaine was used for anesthesia. A needle was placed in each abnormal area. Positioning was confirmed with  additional imaging. A Ireland wire was then placed within each needle and each  needle was removed. The patient tolerated the procedure well with no immediate  complications. Explanation of positioning of the 2 wires:  Using mammographic guidance, the mass in the 9 o'clock position of the posterior  third of the left breast was localized. Of note, the mass is approximately 15  to 20 mm inferior to the biopsy clip, and was localized rather than the biopsy  clip. This represents the posterior wire. Next, microcalcifications were  localized anterior to the malignancy, in the approximate location of MR  abnormality. This serves as the anterior wire.     Post procedure digital mammogram shows the malignancy and approximate anterior  extension localized with bracketing wires from a medial approach. Impression Impression:     Status post bracketing wire localization utilizing 2 wires. Specimen radiograph  is recommended. Lab Results  reviewed  Lab Results   Component Value Date/Time    WBC 8.5 03/24/2017 12:28 PM    HGB 11.3 03/24/2017 12:28 PM    HCT 35.0 03/24/2017 12:28 PM    PLATELET 732 72/14/2504 12:28 PM    MCV 92 03/24/2017 12:28 PM       Lab Results   Component Value Date/Time    Sodium 141 03/24/2017 12:28 PM    Potassium 4.6 03/24/2017 12:28 PM    Chloride 100 03/24/2017 12:28 PM    CO2 21 03/24/2017 12:28 PM    Anion gap 13 02/21/2017 08:24 AM    Glucose 129 03/24/2017 12:28 PM    BUN 19 03/24/2017 12:28 PM    Creatinine 0.58 03/24/2017 12:28 PM    BUN/Creatinine ratio 33 03/24/2017 12:28 PM    GFR est  03/24/2017 12:28 PM    GFR est non- 03/24/2017 12:28 PM    Calcium 9.5 03/24/2017 12:28 PM    AST (SGOT) 15 03/24/2017 12:28 PM    Alk. phosphatase 72 03/24/2017 12:28 PM    Protein, total 6.7 03/24/2017 12:28 PM    Albumin 4.2 03/24/2017 12:28 PM    Globulin 2.8 02/21/2017 08:24 AM    A-G Ratio 1.7 03/24/2017 12:28 PM    ALT (SGPT) 12 03/24/2017 12:28 PM       Assessment/Plan:     Shalini Dobson is a  49 y.o. female and presents with Breast Cancer    CC  Triple neg left breast cancer 8/16    HPI  Pt seen today for office fu for triple neg left breast cancer. DX   Encounter Diagnoses   Name Primary?  Triple negative malignant neoplasm of breast ([facility] Yes    S/P lumpectomy, left breast     Stage 2 carcinoma of breast, ER-, left ([address]     Morbid obesity due to excess calories ([facility]     Type 2 diabetes mellitus without complication, unspecified long term insulin use status ([facility]     Irritable bowel syndrome without diarrhea     Essential hypertension       STAGE: T2N0 triple neg    TREATMENT COURSE: lumpectomy 101/6    1. Stage 2 triple neg breast cancer s/p lumpectomy/ b/l breast reduction/.     NERD.  Pt clinically doing well overall. Looks great. Pt stopped adjuvant TAC due to side effects. Recovered from chemo well. Still has some neuropathy. Pt is seeing radiation at [facility] and had mammo b/l 3/17 there and was good. Also had right last week again and was good. Labs good. 2. HTN/ asthma/ DM. Per PCP. 3. Peripheral neuropathy still present  Try gabapentin 300mg qhs.      4.  Integrative oncology. Seen with navigator today. Reviewed IO and complementary therapy offerings here. Will refer for massage due to MS pain and acupuncture for neuropathy. Call if questions. Follow-up in 3 month. ICD-10-CM ICD-9-CM    1. Stage 2 carcinoma of breast, ER-, left C50.912 174.9 REFERRAL TO MASSAGE THERAPY    Z17.1 V86.1 REFERRAL TO ACUPUNCTURE   2. S/P lumpectomy, left breast Z90.12 V45.89 REFERRAL TO MASSAGE THERAPY      REFERRAL TO ACUPUNCTURE   3. Chemotherapy-induced neuropathy (HCC) G62.0 357.6 REFERRAL TO MASSAGE THERAPY    T45.1X5A E933.1 REFERRAL TO ACUPUNCTURE   4. Joint stiffness M25.60 719.50 REFERRAL TO MASSAGE THERAPY      REFERRAL TO ACUPUNCTURE       Current Outpatient Prescriptions   Medication Sig    gabapentin (NEURONTIN) 300 mg capsule Take 1 Cap by mouth nightly. Indications: NEUROPATHIC PAIN    buPROPion SR (WELLBUTRIN SR) 150 mg SR tablet TAKE 1 TABLET BY MOUTH TWICE DAILY    cetirizine (ZYRTEC) 10 mg tablet Take 1 Tab by mouth daily.  LIRAGLUTIDE (VICTOZA 2-HUGO SC) 1.8 mg by SubCUTAneous route daily.  naproxen (NAPROSYN) 500 mg tablet TAKE 1 TABLET BY MOUTH TWICE DAILY WITH MEALS    ONE TOUCH DELICA 33 gauge misc TEST THREE TIMES DAILY    ONETOUCH ULTRA TEST strip TEST 3 TIMES A DAY    albuterol (PROVENTIL VENTOLIN) 2.5 mg /3 mL (0.083 %) nebulizer solution     dicyclomine (BENTYL) 10 mg capsule Take 2 Caps by mouth four (4) times daily. (Patient taking differently: Take 20 mg by mouth four (4) times daily.  Patient stated she usually takes 10mg twice daily)   Hero Jarvis MIX 70-30 FLEXPEN 100 unit/mL (70-30) inpn USE 15 UNITS BEFORE BREAKFAST AND DINNER    KELLY PEN NEEDLE 32 gauge x 5/32\" ndle USE AS DIRECTED TWICE DAILY    FINACEA 15 % topical gel     tretinoin 0.025 % tpical gel     albuterol (PROVENTIL HFA, VENTOLIN HFA, PROAIR HFA) 90 mcg/actuation inhaler Take 2 Puffs by inhalation every four (4) hours as needed for Wheezing.  HYDROcodone-acetaminophen (NORCO) 5-325 mg per tablet Take 1 Tab by mouth every four (4) hours as needed for Pain. Max Daily Amount: 6 Tabs.  magic mouthwash solution Magic mouth wash   Maalox  Lidocaine 2% viscous   Diphenhydramine oral solution     Pharmacy to mix equal portions of ingredients to a total volume as indicated in the dispense amount. Swish and spit four times daily as needed for mouth sores.  diphenoxylate-atropine (LOMOTIL) 2.5-0.025 mg per tablet Take 1 Tab by mouth four (4) times daily as needed for Diarrhea. Max Daily Amount: 4 Tabs.  HYDROcodone-acetaminophen (NORCO) 7.5-325 mg per tablet Take 1 Tab by mouth every four (4) hours as needed for Pain. Max Daily Amount: 6 Tabs.  acetaminophen (TYLENOL) 325 mg tablet Take 650 mg by mouth every four (4) hours as needed for Pain.  hydroquinone 2 % topical cream Apply  to affected area two (2) times a day. No current facility-administered medications for this visit. continue present plan, call if any problems  There are no Patient Instructions on file for this visit. Follow-up Disposition:  Return in about 4 months (around 10/19/2017).     Anai Simpson DO

## 2017-06-19 NOTE — PROGRESS NOTES
Lavelle Willard,, : 1970  Breast Cancer    I met patient for the first time this afternoon for her office visit with Dr. Vinny Cotto. The patient was pleasant and she stated that she was doing well except for her neuropathy pain involving all four extremities. Dr. Vinny Cotto has prescribed Gabapentin 300mg at HS and may increase to TID as needed prn. I explained my role as ONN and provided her with my contact information. She was very interested in the complimentary services that are provided at the [facility] and I provided her with the telephone number. 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 Patient has recently found a support group on  that she feels has been helpful: \"Breast Cancer Sisters\" and she said that it has been a positive experience for her. She is interested in Acupuncture services and referrals were ordered today by Dr. Vinny Cotto for acupuncture and additional [address] [place] [address] services. Patient has recently had Mammograms and Ultrasounds of her right breast that were done at [address] [place] [address]. She has a f/u appointment with Dr. Liu Woodruff on 8/49/15. She will f/u with Dr. Vinny Cotto in 4 months. She was instructed to notify MD/Nurse with any concerns or questions. ONN Action Plan:    -Introduced myself and explained my role as ONN and provided contact information.  -Educated patient regarding the complimentary services that are available at the [address] [place] [address] and provided contact information for her to schedule appointments at her convenience.   -Patient education as needed.  -No new financial barriers or other barriers to care identified.       Jaxson Dickson, RN, BSN, [facility], [facility]    Oncology Nurse Navigator

## 2017-06-19 NOTE — MR AVS SNAPSHOT
Visit Information Date & Time Provider Department Dept. Phone Encounter #  
 6/19/2017  2:15 PM Yvette Kline, [address] [address] [facility] [id number] Follow-up Instructions Return in about 4 months (around 10/19/2017). Routing History Follow-up and Disposition History Your Appointments 7/17/2017  9:20 AM  
POST OP with Beth Crow MD  
[facility] ([address]) Appt Note: 3 month follow up after port removal/ cp$ 6-19-17 LS  
 [place] [address] [place] [address]  
  
   
 [place] [address] [place] [address] Upcoming Health Maintenance Date Due Pneumococcal 19-64 Highest Risk (1 of 3 - PCV13) 6/8/1988 DTaP/Tdap/Td series (1 - Tdap) 6/8/1990 EYE EXAM RETINAL OR DILATED Q1 6/18/2016 MICROALBUMIN Q1 7/14/2017 PAP AKA CERVICAL CYTOLOGY 8/6/2017 INFLUENZA AGE 9 TO ADULT 8/1/2017 HEMOGLOBIN A1C Q6M 9/24/2017 FOOT EXAM Q1 3/24/2018 LIPID PANEL Q1 3/24/2018 Allergies as of 6/19/2017  Review Complete On: 6/19/2017 By: Yvette Kline DO Severity Noted Reaction Type Reactions Codeine  02/21/2011    Diarrhea Pravastatin  08/26/2016    Myalgia Terrible leg aching. Theophyl  09/29/2014   Side Effect Diarrhea, Nausea and Vomiting Theophylline  02/21/2011    Diarrhea Current Immunizations  Reviewed on 3/21/2017 Name Date Influenza Vaccine 9/28/2016 Not reviewed this visit You Were Diagnosed With   
  
 Codes Comments Stage 2 carcinoma of breast, ER-, left    -  Primary ICD-10-CM: C50.912, Z17.1 ICD-9-CM: 174.9, V86.1 S/P lumpectomy, left breast     ICD-10-CM: T87.70 ICD-9-CM: V45.89 Chemotherapy-induced neuropathy (HCC)     ICD-10-CM: G62.0, T45.1X5A 
ICD-9-CM: 357.6, E933.1 Joint stiffness     ICD-10-CM: M25.60 ICD-9-CM: 719.50 Vitals BP Pulse Temp Resp Height(growth percentile) Weight(growth percentile) 122/80 (BP 1 Location: Right arm, BP Patient Position: Sitting) (!) 106 98.7 °F (37.1 °C) (Oral) 16 5' 1\" (1.549 m) 244 lb 6.4 oz (110.9 kg) SpO2 BMI OB Status Smoking Status 98% 46.18 kg/m2 Hysterectomy Never Smoker BMI and BSA Data Body Mass Index Body Surface Area  
 46.18 kg/m 2 2.18 m 2 Preferred Pharmacy Pharmacy Name Phone [facility] [address] [facility] [address] [address] [address] [facility] [phone] Your Updated Medication List  
  
   
This list is accurate as of: 6/19/17  2:51 PM.  Always use your most recent med list.  
  
  
  
  
 acetaminophen 325 mg tablet Commonly known as:  TYLENOL Take 650 mg by mouth every four (4) hours as needed for Pain. * albuterol 90 mcg/actuation inhaler Commonly known as:  PROVENTIL HFA, VENTOLIN HFA, PROAIR HFA Take 2 Puffs by inhalation every four (4) hours as needed for Wheezing. * albuterol 2.5 mg /3 mL (0.083 %) nebulizer solution Commonly known as:  PROVENTIL VENTOLIN  
  
 buPROPion  mg SR tablet Commonly known as:  WELLBUTRIN SR  
TAKE 1 TABLET BY MOUTH TWICE DAILY  
  
 cetirizine 10 mg tablet Commonly known as:  ZYRTEC Take 1 Tab by mouth daily. dicyclomine 10 mg capsule Commonly known as:  BENTYL Take 2 Caps by mouth four (4) times daily. diphenoxylate-atropine 2.5-0.025 mg per tablet Commonly known as:  LOMOTIL Take 1 Tab by mouth four (4) times daily as needed for Diarrhea. Max Daily Amount: 4 Tabs. FINACEA 15 % topical gel Generic drug:  azelaic acid  
  
 gabapentin 300 mg capsule Commonly known as:  NEURONTIN Take 1 Cap by mouth nightly. Indications: NEUROPATHIC PAIN  
  
 * HYDROcodone-acetaminophen 7.5-325 mg per tablet Commonly known as:  [address] Take 1 Tab by mouth every four (4) hours as needed for Pain. Max Daily Amount: 6 Tabs. * HYDROcodone-acetaminophen 5-325 mg per tablet Commonly known as:  Lynnda Levo Take 1 Tab by mouth every four (4) hours as needed for Pain. Max Daily Amount: 6 Tabs. hydroquinone 2 % topical cream  
Apply  to affected area two (2) times a day. magic mouthwash solution Magic mouth wash  Maalox Lidocaine 2% viscous  Diphenhydramine oral solution   Pharmacy to mix equal portions of ingredients to a total volume as indicated in the dispense amount. Swish and spit four times daily as needed for mouth sores. Mary Pen Needle 32 gauge x 5/32\" Ndle Generic drug:  Insulin Needles (Disposable) USE AS DIRECTED TWICE DAILY  
  
 naproxen 500 mg tablet Commonly known as:  NAPROSYN  
TAKE 1 TABLET BY MOUTH TWICE DAILY WITH MEALS NovoLOG Mix 70-30 FlexPen 100 unit/mL (70-30) Inpn Generic drug:  insulin aspart protamine/insulin aspart USE 15 UNITS BEFORE BREAKFAST AND DINNER One Touch Delica 33 gauge Misc Generic drug:  lancets TEST THREE TIMES DAILY  
  
 ONETOUCH ULTRA TEST strip Generic drug:  glucose blood VI test strips TEST 3 TIMES A DAY  
  
 tretinoin 0.025 % tpical gel VICTOZA 2-HUGO SC  
1.8 mg by SubCUTAneous route daily. * Notice: This list has 4 medication(s) that are the same as other medications prescribed for you. Read the directions carefully, and ask your doctor or other care provider to review them with you. Prescriptions Sent to Pharmacy Refills  
 gabapentin (NEURONTIN) 300 mg capsule 0 Sig: Take 1 Cap by mouth nightly. Indications: NEUROPATHIC PAIN Class: Normal  
 Pharmacy: [facility] [address] [place] [address] AT [address] [facility] Ph #: [phone] Route: Oral  
  
We Performed the Following REFERRAL TO ACUPUNCTURE [REF1 Custom] Comments:  
 Please evaluate patient for triple negative breast cancer with neuropathy hands/ feet post chemo REFERRAL TO MASSAGE THERAPY [ADG047 Custom] Comments:  
 Please evaluate patient for triple neg breast cancer/ done TAC chemo Follow-up Instructions Return in about 4 months (around 10/19/2017). Referral Information Referral ID Referred By Referred To  
  
 [id number] Diego Carlson Not Available Visits Status Start Date End Date 1 New Request 6/19/17 6/19/18 If your referral has a status of pending review or denied, additional information will be sent to support the outcome of this decision. Referral ID Referred By Referred To  
 [id number] Diego Carlson Not Available Visits Status Start Date End Date 1 New Request 6/19/17 6/19/18 If your referral has a status of pending review or denied, additional information will be sent to support the outcome of this decision. Introducing \[facility] [place] [facility]\"" & HEALTH SERVICES! Dear Kenzie Bustillo: 
Thank you for requesting a [employer] account. Our records indicate that you already have an active [employer] account. You can access your account anytime at [email or web address] Did you know that you can access your hospital and ER discharge instructions at any time in [employer]? You can also review all of your test results from your hospital stay or ER visit. Additional Information If you have questions, please visit the Frequently Asked Questions section of the [employer] website at [email or web address] Remember, [employer] is NOT to be used for urgent needs. For medical emergencies, dial 911. Now available from your iPhone and Android! Please provide this summary of care documentation to your next provider. Your primary care clinician is listed as Lawrence Baeza. If you have any questions after today's visit, please call [phone].

## 2017-06-26 ENCOUNTER — TELEPHONE (OUTPATIENT)
Dept: PALLATIVE CARE | Age: 47
End: 2017-06-26

## 2017-06-27 ENCOUNTER — APPOINTMENT (OUTPATIENT)
Dept: INFUSION THERAPY | Age: 47
End: 2017-06-27

## 2017-07-17 ENCOUNTER — OFFICE VISIT (OUTPATIENT)
Dept: SURGERY | Age: 47
End: 2017-07-17

## 2017-07-17 VITALS
WEIGHT: 245 LBS | BODY MASS INDEX: 46.26 KG/M2 | SYSTOLIC BLOOD PRESSURE: 134 MMHG | HEART RATE: 98 BPM | DIASTOLIC BLOOD PRESSURE: 59 MMHG | HEIGHT: 61 IN

## 2017-07-17 DIAGNOSIS — C50.312 MALIGNANT NEOPLASM OF LOWER-INNER QUADRANT OF LEFT FEMALE BREAST (HCC): Primary | ICD-10-CM

## 2017-07-17 DIAGNOSIS — L91.0 KELOID SCAR: Primary | ICD-10-CM

## 2017-07-17 NOTE — COMMUNICATION BODY
HISTORY OF PRESENT ILLNESS  Clau Gallegos is a 49 y.o. female. HPI   ESTABLISHED patient here for follow-up of LEFT breast cancer. Is doing well. Has complaints of tenderness on a keloid at her PAC site. Otherwise, has off/on pain in the breasts, but nothing that is constant. Did have a LEFT mammogram for breast lumps that the patient was feeling 4/2017 which was normal other than fat necrosis. Had a RIGHT mammogram 6/2017 for pain and lumps which also revealed fat necrosis (per patient, we do not have reports from [address] [place] [address]).    10/13/16: LT lumpectomy with SNBx and RT reduction mammoplasty. LT breast: 3 cm, gr 3 IDC. 0/4 LN involved. Triple negative. Clear margins. pT2 pN0(sn) pMx. RT breast: Benign. 01/31/17: s/p adjuvant chemo (TAC x4) with Dr. Gamal Hayden, stopped due to peripheral neuropathy. 03/2017: started XRT (6/30 treatments) at [facility] [place] [facility]. RIGHT mammogram and ultrasound 6/14/17 at [address] [place] [address] showed fat necrosis (per patient). LEFT mammogram and ultrasound 4/2017 at [address] [place] [address] showed fat necrosis (per patient).       Past Medical History:   Diagnosis Date    Arthritis     Asthma     Cancer ([address]     BREAST left    Chronic pain     Diabetes ([facility]     Dyslipidemia     IBS (irritable bowel syndrome)     Nausea & vomiting     Noncompliance     Obesity     Psychiatric disorder     S/P breast biopsy, left 8/1/16    Sinusitis     Stress at home        Past Surgical History:   Procedure Laterality Date    HX BREAST LUMPECTOMY Left 10/13/2016    LEFT BREAST REDUCTION LUMPECTOMY, LEFT SENTINEL NODE BIOPSY, LEFT BRACKETED NEEDLE LOCALIZATION, LEFT BREAST RECONSTRUCTION performed by Ulises Truong MD at [address] HX BREAST RECONSTRUCTION Bilateral 10/13/2016    BREAST REDUCTION performed by Ragini Batista MD at [facility]      X2    HX GYN      ABLATION    HX HERNIA REPAIR      AS INFANT    HX HYSTERECTOMY      full    HX ORTHOPAEDIC      L THUMB Social History     Social History    Marital status: SINGLE     Spouse name: N/A    Number of children: N/A    Years of education: N/A     Occupational History    Not on file. Social History Main Topics    Smoking status: Never Smoker    Smokeless tobacco: Never Used    Alcohol use No    Drug use: No    Sexual activity: Not on file     Other Topics Concern    Not on file     Social History Narrative    ** Merged History Encounter **            Current Outpatient Prescriptions on File Prior to Visit   Medication Sig Dispense Refill    naproxen (NAPROSYN) 500 mg tablet TAKE 1 TABLET BY MOUTH TWICE DAILY WITH MEALS 60 Tab 6    buPROPion SR (WELLBUTRIN SR) 150 mg SR tablet TAKE 1 TABLET BY MOUTH TWICE DAILY 30 Tab 11    cetirizine (ZYRTEC) 10 mg tablet Take 1 Tab by mouth daily. 60 Tab 1    LIRAGLUTIDE (VICTOZA 2-HUGO SC) 1.8 mg by SubCUTAneous route daily.  ONE TOUCH DELICA 33 gauge misc TEST THREE TIMES DAILY 100 Lancet 11    ONETOUCH ULTRA TEST strip TEST 3 TIMES A  Strip 11    albuterol (PROVENTIL VENTOLIN) 2.5 mg /3 mL (0.083 %) nebulizer solution       dicyclomine (BENTYL) 10 mg capsule Take 2 Caps by mouth four (4) times daily. (Patient taking differently: Take 20 mg by mouth four (4) times daily. Patient stated she usually takes 10mg twice daily) 120 Cap 11    NOVOLOG MIX 70-30 FLEXPEN 100 unit/mL (70-30) inpn USE 15 UNITS BEFORE BREAKFAST AND DINNER 15 mL 11    KELLY PEN NEEDLE 32 gauge x 5/32\" ndle USE AS DIRECTED TWICE DAILY 100 Pen Needle 11    albuterol (PROVENTIL HFA, VENTOLIN HFA, PROAIR HFA) 90 mcg/actuation inhaler Take 2 Puffs by inhalation every four (4) hours as needed for Wheezing. 1 Inhaler 11    gabapentin (NEURONTIN) 300 mg capsule Take 1 Cap by mouth nightly. Indications: NEUROPATHIC PAIN 30 Cap 0     No current facility-administered medications on file prior to visit.         Allergies   Allergen Reactions    Codeine Diarrhea    Pravastatin Myalgia Terrible leg aching.  Theophyl Diarrhea and Nausea and Vomiting    Theophylline Diarrhea       OB History     Obstetric Comments    Menarche: 7. LMP: 2006. # of Children: 2. Age at Delivery of First Child: 22.   Hysterectomy/oophorectomy: yes/yes  Breast Bx: no.  Hx of Breast Feeding: no  BCP: yes [id number]. Hormone therapy: yes 2008-present. ROS  Constitutional: Negative    HENT: Negative. Eyes: Negative. Respiratory: Negative. Cardiovascular: Negative. Gastrointestinal: Negative. Genitourinary: Negative. Musculoskeletal: Negative. Skin: Negative. Neurological: Negative. Endo/Heme/Allergies: Negative. Psychiatric/Behavioral: Negative. Physical Exam   Cardiovascular: Normal rate and normal heart sounds. Pulmonary/Chest: Breath sounds normal. Right breast exhibits no inverted nipple, no mass, no nipple discharge, no skin change and no tenderness. Left breast exhibits no inverted nipple, no mass, no nipple discharge, no skin change and no tenderness. Breasts are symmetrical.       Lymphadenopathy:        Right cervical: No superficial cervical, no deep cervical and no posterior cervical adenopathy present. Left cervical: No superficial cervical, no deep cervical and no posterior cervical adenopathy present. Right axillary: No pectoral and no lateral adenopathy present. Left axillary: No pectoral and no lateral adenopathy present. ASSESSMENT and PLAN    ICD-10-CM ICD-9-CM    1. Malignant neoplasm of lower-inner quadrant of left female breast ([facility] C50.312 174.3      Pt s/p port removal and doing well. Addressed symptomatic keloid scar where port was removed, and discussed options including observation or surgical excision of scar followed by steroid injection. Pt elected for surgery. Will schedule excision of keloid scar with steroid injection. This plan was reviewed with the patient and patient agrees. All questions were answered.     Written by Alexandra Avalos, as dictated by Dr. Blade Saunders MD.

## 2017-07-17 NOTE — PROGRESS NOTES
HISTORY OF PRESENT ILLNESS  Monse Velasquez is a 49 y.o. female. HPI    ESTABLISHED patient here for follow-up of LEFT breast cancer. Is doing well. Has complaints of tenderness on a keloid at her PAC site. Otherwise, has off/on pain in the breasts, but nothing that is constant. Did have a LEFT mammogram for breast lumps that the patient was feeling 4/2017 which was normal other than fat necrosis. Had a RIGHT mammogram 6/2017 for pain and lumps which also revealed fat necrosis (per patient, we do not have reports from [facility]). 10/13/16: LT lumpectomy with SNBx and RT reduction mammoplasty. LT breast: 3 cm, gr 3 IDC. 0/4 LN involved. Triple negative. Clear margins. pT2 pN0(sn) pMx. RT breast: Benign. 01/31/17: s/p adjuvant chemo (TAC x4) with Dr. Dinah Penaloza, stopped due to peripheral neuropathy. 03/2017: started XRT (6/30 treatments) at [facility] [place]. RIGHT mammogram and ultrasound 6/14/17 at [facility] showed fat necrosis (per patient). LEFT mammogram and ultrasound 4/2017 at [facility] showed fat necrosis (per patient).       ROS    Physical Exam    ASSESSMENT and PLAN  {ASSESSMENT/PLAN:[id number]}

## 2017-07-17 NOTE — PROGRESS NOTES
HISTORY OF PRESENT ILLNESS  Golden Buerger is a 49 y.o. female. HPI   ESTABLISHED patient here for follow-up of LEFT breast cancer. Is doing well. Has complaints of tenderness on a keloid at her PAC site. Otherwise, has off/on pain in the breasts, but nothing that is constant. Did have a LEFT mammogram for breast lumps that the patient was feeling 4/2017 which was normal other than fat necrosis. Had a RIGHT mammogram 6/2017 for pain and lumps which also revealed fat necrosis (per patient, we do not have reports from [facility]).    10/13/16: LT lumpectomy with SNBx and RT reduction mammoplasty. LT breast: 3 cm, gr 3 IDC. 0/4 LN involved. Triple negative. Clear margins. pT2 pN0(sn) pMx. RT breast: Benign. 01/31/17: s/p adjuvant chemo (TAC x4) with Dr. Phillip Hancock, stopped due to peripheral neuropathy. 03/2017: started XRT (6/30 treatments) at [facility]. RIGHT mammogram and ultrasound 6/14/17 at [facility] showed fat necrosis (per patient). LEFT mammogram and ultrasound 4/2017 at [facility] showed fat necrosis (per patient).       Past Medical History:   Diagnosis Date    Arthritis     Asthma     Cancer ([address]     BREAST left    Chronic pain     Diabetes ([facility]     Dyslipidemia     IBS (irritable bowel syndrome)     Nausea & vomiting     Noncompliance     Obesity     Psychiatric disorder     S/P breast biopsy, left 8/1/16    Sinusitis     Stress at home        Past Surgical History:   Procedure Laterality Date    HX BREAST LUMPECTOMY Left 10/13/2016    LEFT BREAST REDUCTION LUMPECTOMY, LEFT SENTINEL NODE BIOPSY, LEFT BRACKETED NEEDLE LOCALIZATION, LEFT BREAST RECONSTRUCTION performed by Lisa Nunez MD at [address] [place] [address] HX BREAST RECONSTRUCTION Bilateral 10/13/2016    BREAST REDUCTION performed by Jimmy Lock MD at [facility]      X2    HX GYN      ABLATION    HX HERNIA REPAIR      AS INFANT    HX HYSTERECTOMY      full    HX ORTHOPAEDIC      L THUMB Social History     Social History    Marital status: SINGLE     Spouse name: N/A    Number of children: N/A    Years of education: N/A     Occupational History    Not on file. Social History Main Topics    Smoking status: Never Smoker    Smokeless tobacco: Never Used    Alcohol use No    Drug use: No    Sexual activity: Not on file     Other Topics Concern    Not on file     Social History Narrative    ** Merged History Encounter **            Current Outpatient Prescriptions on File Prior to Visit   Medication Sig Dispense Refill    naproxen (NAPROSYN) 500 mg tablet TAKE 1 TABLET BY MOUTH TWICE DAILY WITH MEALS 60 Tab 6    buPROPion SR (WELLBUTRIN SR) 150 mg SR tablet TAKE 1 TABLET BY MOUTH TWICE DAILY 30 Tab 11    cetirizine (ZYRTEC) 10 mg tablet Take 1 Tab by mouth daily. 60 Tab 1    LIRAGLUTIDE (VICTOZA 2-HUGO SC) 1.8 mg by SubCUTAneous route daily.  ONE TOUCH DELICA 33 gauge misc TEST THREE TIMES DAILY 100 Lancet 11    ONETOUCH ULTRA TEST strip TEST 3 TIMES A  Strip 11    albuterol (PROVENTIL VENTOLIN) 2.5 mg /3 mL (0.083 %) nebulizer solution       dicyclomine (BENTYL) 10 mg capsule Take 2 Caps by mouth four (4) times daily. (Patient taking differently: Take 20 mg by mouth four (4) times daily. Patient stated she usually takes 10mg twice daily) 120 Cap 11    NOVOLOG MIX 70-30 FLEXPEN 100 unit/mL (70-30) inpn USE 15 UNITS BEFORE BREAKFAST AND DINNER 15 mL 11    KELLY PEN NEEDLE 32 gauge x 5/32\" ndle USE AS DIRECTED TWICE DAILY 100 Pen Needle 11    albuterol (PROVENTIL HFA, VENTOLIN HFA, PROAIR HFA) 90 mcg/actuation inhaler Take 2 Puffs by inhalation every four (4) hours as needed for Wheezing. 1 Inhaler 11    gabapentin (NEURONTIN) 300 mg capsule Take 1 Cap by mouth nightly. Indications: NEUROPATHIC PAIN 30 Cap 0     No current facility-administered medications on file prior to visit.         Allergies   Allergen Reactions    Codeine Diarrhea    Pravastatin Myalgia Terrible leg aching.  Theophyl Diarrhea and Nausea and Vomiting    Theophylline Diarrhea       OB History     Obstetric Comments    Menarche: 7. LMP: 2006. # of Children: 2. Age at Delivery of First Child: 22.   Hysterectomy/oophorectomy: yes/yes  Breast Bx: no.  Hx of Breast Feeding: no  BCP: yes [id number]. Hormone therapy: yes 2008-present. ROS  Constitutional: Negative    HENT: Negative. Eyes: Negative. Respiratory: Negative. Cardiovascular: Negative. Gastrointestinal: Negative. Genitourinary: Negative. Musculoskeletal: Negative. Skin: Negative. Neurological: Negative. Endo/Heme/Allergies: Negative. Psychiatric/Behavioral: Negative. Physical Exam   Cardiovascular: Normal rate and normal heart sounds. Pulmonary/Chest: Breath sounds normal. Right breast exhibits no inverted nipple, no mass, no nipple discharge, no skin change and no tenderness. Left breast exhibits no inverted nipple, no mass, no nipple discharge, no skin change and no tenderness. Breasts are symmetrical.       Lymphadenopathy:        Right cervical: No superficial cervical, no deep cervical and no posterior cervical adenopathy present. Left cervical: No superficial cervical, no deep cervical and no posterior cervical adenopathy present. Right axillary: No pectoral and no lateral adenopathy present. Left axillary: No pectoral and no lateral adenopathy present. ASSESSMENT and PLAN    ICD-10-CM ICD-9-CM    1. Malignant neoplasm of lower-inner quadrant of left female breast ([facility] [address] C50.312 174.3      Pt s/p port removal and doing well. Addressed symptomatic keloid scar where port was removed, and discussed options including observation or surgical excision of scar followed by steroid injection. Pt elected for surgery. Will schedule excision of keloid scar with steroid injection. This plan was reviewed with the patient and patient agrees. All questions were answered.     Written by Jazzmine Shepherd, as dictated by Dr. Anna Marie Doll MD.

## 2017-07-25 ENCOUNTER — APPOINTMENT (OUTPATIENT)
Dept: INFUSION THERAPY | Age: 47
End: 2017-07-25

## 2017-07-25 ENCOUNTER — DOCUMENTATION ONLY (OUTPATIENT)
Dept: SURGERY | Age: 47
End: 2017-07-25

## 2017-07-26 ENCOUNTER — DOCUMENTATION ONLY (OUTPATIENT)
Dept: SURGERY | Age: 47
End: 2017-07-26

## 2017-07-26 NOTE — PROGRESS NOTES
PATIENT'S SURGERY TIME AT [facility] HAS CHANGED TO 11 AM; ARRIVING AT 9 AM.  PATIENT NOTIFIED

## 2017-07-27 ENCOUNTER — HOSPITAL ENCOUNTER (OUTPATIENT)
Dept: PREADMISSION TESTING | Age: 47
Discharge: HOME | End: 2017-07-27
Payer: COMMERCIAL

## 2017-07-27 VITALS
OXYGEN SATURATION: 98 % | WEIGHT: 247 LBS | BODY MASS INDEX: 46.63 KG/M2 | TEMPERATURE: 97.9 F | RESPIRATION RATE: 16 BRPM | SYSTOLIC BLOOD PRESSURE: 144 MMHG | DIASTOLIC BLOOD PRESSURE: 77 MMHG | HEIGHT: 61 IN | HEART RATE: 80 BPM

## 2017-07-27 LAB
ANION GAP BLD CALC-SCNC: 5 MMOL/L (ref 5–15)
ATRIAL RATE: 83 BPM
BASOPHILS # BLD AUTO: 0 K/UL (ref 0–0.1)
BASOPHILS # BLD: 0 % (ref 0–1)
BUN SERPL-MCNC: 9 MG/DL (ref 6–20)
BUN/CREAT SERPL: 16 (ref 12–20)
CALCIUM SERPL-MCNC: 8.8 MG/DL (ref 8.5–10.1)
CALCULATED P AXIS, ECG09: 63 DEGREES
CALCULATED R AXIS, ECG10: 49 DEGREES
CALCULATED T AXIS, ECG11: 48 DEGREES
CHLORIDE SERPL-SCNC: 107 MMOL/L (ref 97–108)
CO2 SERPL-SCNC: 30 MMOL/L (ref 21–32)
CREAT SERPL-MCNC: 0.57 MG/DL (ref 0.55–1.02)
DIAGNOSIS, 93000: NORMAL
DIFFERENTIAL METHOD BLD: NORMAL
EOSINOPHIL # BLD: 0 K/UL (ref 0–0.4)
EOSINOPHIL NFR BLD: 0 % (ref 0–7)
ERYTHROCYTE [DISTWIDTH] IN BLOOD BY AUTOMATED COUNT: 13.7 % (ref 11.5–14.5)
GLUCOSE SERPL-MCNC: 228 MG/DL (ref 65–100)
HCT VFR BLD AUTO: 36.4 % (ref 35–47)
HGB BLD-MCNC: 12.1 G/DL (ref 11.5–16)
LYMPHOCYTES # BLD AUTO: 32 % (ref 12–49)
LYMPHOCYTES # BLD: 2.2 K/UL (ref 0.8–3.5)
MCH RBC QN AUTO: 28.7 PG (ref 26–34)
MCHC RBC AUTO-ENTMCNC: 33.2 G/DL (ref 30–36.5)
MCV RBC AUTO: 86.3 FL (ref 80–99)
MONOCYTES # BLD: 0.4 K/UL (ref 0–1)
MONOCYTES NFR BLD AUTO: 5 % (ref 5–13)
NEUTS SEG # BLD: 4.4 K/UL (ref 1.8–8)
NEUTS SEG NFR BLD AUTO: 63 % (ref 32–75)
P-R INTERVAL, ECG05: 148 MS
PLATELET # BLD AUTO: 259 K/UL (ref 150–400)
POTASSIUM SERPL-SCNC: 4.5 MMOL/L (ref 3.5–5.1)
Q-T INTERVAL, ECG07: 378 MS
QRS DURATION, ECG06: 78 MS
QTC CALCULATION (BEZET), ECG08: 444 MS
RBC # BLD AUTO: 4.22 M/UL (ref 3.8–5.2)
RBC MORPH BLD: NORMAL
SODIUM SERPL-SCNC: 142 MMOL/L (ref 136–145)
VENTRICULAR RATE, ECG03: 83 BPM
WBC # BLD AUTO: 7 K/UL (ref 3.6–11)

## 2017-07-27 PROCEDURE — 80048 BASIC METABOLIC PNL TOTAL CA: CPT | Performed by: SURGERY

## 2017-07-27 PROCEDURE — 93005 ELECTROCARDIOGRAM TRACING: CPT

## 2017-07-27 PROCEDURE — 85025 COMPLETE CBC W/AUTO DIFF WBC: CPT | Performed by: SURGERY

## 2017-07-27 PROCEDURE — 36415 COLL VENOUS BLD VENIPUNCTURE: CPT | Performed by: SURGERY

## 2017-07-27 RX ORDER — DICYCLOMINE HYDROCHLORIDE 10 MG/1
20 CAPSULE ORAL
COMMUNITY
End: 2020-06-23 | Stop reason: SDUPTHER

## 2017-07-27 NOTE — PERIOP NOTES
[facility]  PREOPERATIVE INSTRUCTIONS    Surgery Date:   8/1/17      Surgery arrival time given by surgeon: YES  0900  1. Report  to the 2nd Floor Admitting Desk at the time you were told the night before surgery. Bring your insurance card, photo identification, and any copayment (if applicable). 2. You must have a responsible adult to drive you home and stay with you the first 24 hours after surgery if you are going home the same day of your surgery. 3. Nothing to eat or drink after midnight the night before surgery. This means NO water, gum, mints, coffee, juice, etc.    4. MEDICATIONS TO TAKE THE MORNING OF SURGERY WITH A SIP OF WATER:bring Albuterol inhaler,  5. No alcoholic beverages 24 hours before and after your surgery. 6. If you are being admitted to the hospital,please leave personal belongings/luggage in your car until you have an assigned hospital room number. ( The hospital discharge time is 12 PM NOON. Your adult  should be at the hospital prior to the noon discharge time unless otherwise instructed.)   7. STOP Aspirin and/or any non-steroidal anti-inflammatory drugs (i.e. Ibuprofen, Naproxen, Advil, Aleve) as directed by your surgeon. You may take Tylenol. Stop herbal supplements 1 week prior to  surgery. 8. If you are currently taking Plavix, Coumadin,or any other blood-thinning/ anticoagulant medication contact your surgeon for instructions. 9. Wear comfortable clothes. Wear your glasses instead of contacts. Please leave all money, jewelry and valuables at home. No make up, particularly mascara, the day of surgery. 10.  REMOVE ALL body piercings, rings,and jewelry and leave at home. Wear your hair loose or down, no pony-tails, buns, or any metal hair clips. 11. If you shower the morning of surgery, please do not apply any lotions, powders, or deodorants afterwards. Do not shave any body area within 24 hours of your surgery.   12. Please follow all instructions to avoid any potential surgical cancellation. 13. Should your physical condition change, (i.e. fever, cold, flu, etc.) please notify your surgeon as soon as possible. 14. It is important to be on time. If a situation occurs where you may be delayed, please call: ( [phone] on the day of surgery. 15. The [employer] staff can be reached at [phone]. 16. Special instructions: free  parking  17. The patient was contacted  in person. She  verbalize  understanding of all instructions does not  need reinforcement.

## 2017-07-31 ENCOUNTER — ANESTHESIA EVENT (OUTPATIENT)
Dept: SURGERY | Age: 47
End: 2017-07-31
Payer: COMMERCIAL

## 2017-08-01 ENCOUNTER — ANESTHESIA (OUTPATIENT)
Dept: SURGERY | Age: 47
End: 2017-08-01
Payer: COMMERCIAL

## 2017-08-01 ENCOUNTER — HOSPITAL ENCOUNTER (OUTPATIENT)
Age: 47
Setting detail: OUTPATIENT SURGERY
Discharge: HOME | End: 2017-08-01
Attending: SURGERY | Admitting: SURGERY
Payer: COMMERCIAL

## 2017-08-01 VITALS
BODY MASS INDEX: 46.62 KG/M2 | SYSTOLIC BLOOD PRESSURE: 139 MMHG | HEIGHT: 61 IN | OXYGEN SATURATION: 99 % | WEIGHT: 246.91 LBS | DIASTOLIC BLOOD PRESSURE: 78 MMHG | TEMPERATURE: 97.6 F | HEART RATE: 95 BPM | RESPIRATION RATE: 22 BRPM

## 2017-08-01 DIAGNOSIS — L91.0 KELOID SCAR: ICD-10-CM

## 2017-08-01 LAB
GLUCOSE BLD STRIP.AUTO-MCNC: 109 MG/DL (ref 65–100)
GLUCOSE BLD STRIP.AUTO-MCNC: 121 MG/DL (ref 65–100)
SERVICE CMNT-IMP: ABNORMAL
SERVICE CMNT-IMP: ABNORMAL

## 2017-08-01 PROCEDURE — 77030032490 HC SLV COMPR SCD KNE COVD -B: Performed by: SURGERY

## 2017-08-01 PROCEDURE — 77030002986 HC SUT PROL J&J -A: Performed by: SURGERY

## 2017-08-01 PROCEDURE — 74011250636 HC RX REV CODE- 250/636

## 2017-08-01 PROCEDURE — 76210000056 HC AMBSU PH II REC 1.5 TO 2 HR: Performed by: SURGERY

## 2017-08-01 PROCEDURE — 77030031139 HC SUT VCRL2 J&J -A: Performed by: SURGERY

## 2017-08-01 PROCEDURE — 74011250636 HC RX REV CODE- 250/636: Performed by: ANESTHESIOLOGY

## 2017-08-01 PROCEDURE — 74011000250 HC RX REV CODE- 250: Performed by: SURGERY

## 2017-08-01 PROCEDURE — 82962 GLUCOSE BLOOD TEST: CPT

## 2017-08-01 PROCEDURE — 74011000258 HC RX REV CODE- 258: Performed by: SURGERY

## 2017-08-01 PROCEDURE — 77030020782 HC GWN BAIR PAWS FLX 3M -B

## 2017-08-01 PROCEDURE — 77030010507 HC ADH SKN DERMBND J&J -B: Performed by: SURGERY

## 2017-08-01 PROCEDURE — 76030000002 HC AMB SURG OR TIME FIRST 0.: Performed by: SURGERY

## 2017-08-01 PROCEDURE — 77030018836 HC SOL IRR NACL ICUM -A: Performed by: SURGERY

## 2017-08-01 PROCEDURE — 77030002933 HC SUT MCRYL J&J -A: Performed by: SURGERY

## 2017-08-01 PROCEDURE — 88305 TISSUE EXAM BY PATHOLOGIST: CPT | Performed by: SURGERY

## 2017-08-01 PROCEDURE — 76060000073 HC AMB SURG ANES FIRST 0.5 HR: Performed by: SURGERY

## 2017-08-01 PROCEDURE — 77030011267 HC ELECTRD BLD COVD -A: Performed by: SURGERY

## 2017-08-01 PROCEDURE — 74011250636 HC RX REV CODE- 250/636: Performed by: SURGERY

## 2017-08-01 RX ORDER — SODIUM CHLORIDE, SODIUM LACTATE, POTASSIUM CHLORIDE, CALCIUM CHLORIDE 600; 310; 30; 20 MG/100ML; MG/100ML; MG/100ML; MG/100ML
125 INJECTION, SOLUTION INTRAVENOUS CONTINUOUS
Status: DISCONTINUED | OUTPATIENT
Start: 2017-08-01 | End: 2017-08-01 | Stop reason: HOSPADM

## 2017-08-01 RX ORDER — MIDAZOLAM HYDROCHLORIDE 1 MG/ML
INJECTION, SOLUTION INTRAMUSCULAR; INTRAVENOUS AS NEEDED
Status: DISCONTINUED | OUTPATIENT
Start: 2017-08-01 | End: 2017-08-01 | Stop reason: HOSPADM

## 2017-08-01 RX ORDER — BUPIVACAINE HYDROCHLORIDE AND EPINEPHRINE 5; 5 MG/ML; UG/ML
30 INJECTION, SOLUTION EPIDURAL; INTRACAUDAL; PERINEURAL ONCE
Status: DISCONTINUED | OUTPATIENT
Start: 2017-08-01 | End: 2017-08-01 | Stop reason: HOSPADM

## 2017-08-01 RX ORDER — SODIUM CHLORIDE 0.9 % (FLUSH) 0.9 %
5-10 SYRINGE (ML) INJECTION EVERY 8 HOURS
Status: DISCONTINUED | OUTPATIENT
Start: 2017-08-01 | End: 2017-08-01 | Stop reason: HOSPADM

## 2017-08-01 RX ORDER — ONDANSETRON 2 MG/ML
INJECTION INTRAMUSCULAR; INTRAVENOUS AS NEEDED
Status: DISCONTINUED | OUTPATIENT
Start: 2017-08-01 | End: 2017-08-01 | Stop reason: HOSPADM

## 2017-08-01 RX ORDER — SODIUM CHLORIDE 0.9 % (FLUSH) 0.9 %
5-10 SYRINGE (ML) INJECTION AS NEEDED
Status: DISCONTINUED | OUTPATIENT
Start: 2017-08-01 | End: 2017-08-01 | Stop reason: HOSPADM

## 2017-08-01 RX ORDER — PROPOFOL 10 MG/ML
INJECTION, EMULSION INTRAVENOUS
Status: DISCONTINUED | OUTPATIENT
Start: 2017-08-01 | End: 2017-08-01 | Stop reason: HOSPADM

## 2017-08-01 RX ORDER — HYDROMORPHONE HYDROCHLORIDE 1 MG/ML
.25-1 INJECTION, SOLUTION INTRAMUSCULAR; INTRAVENOUS; SUBCUTANEOUS
Status: DISCONTINUED | OUTPATIENT
Start: 2017-08-01 | End: 2017-08-01 | Stop reason: HOSPADM

## 2017-08-01 RX ORDER — DIPHENHYDRAMINE HYDROCHLORIDE 50 MG/ML
12.5 INJECTION, SOLUTION INTRAMUSCULAR; INTRAVENOUS AS NEEDED
Status: DISCONTINUED | OUTPATIENT
Start: 2017-08-01 | End: 2017-08-01 | Stop reason: HOSPADM

## 2017-08-01 RX ORDER — HYDROCODONE BITARTRATE AND ACETAMINOPHEN 7.5; 325 MG/1; MG/1
1 TABLET ORAL
Qty: 20 TAB | Refills: 0 | Status: SHIPPED | OUTPATIENT
Start: 2017-08-01 | End: 2017-11-08 | Stop reason: ALTCHOICE

## 2017-08-01 RX ORDER — SODIUM CHLORIDE, SODIUM LACTATE, POTASSIUM CHLORIDE, CALCIUM CHLORIDE 600; 310; 30; 20 MG/100ML; MG/100ML; MG/100ML; MG/100ML
100 INJECTION, SOLUTION INTRAVENOUS CONTINUOUS
Status: DISCONTINUED | OUTPATIENT
Start: 2017-08-01 | End: 2017-08-01 | Stop reason: HOSPADM

## 2017-08-01 RX ORDER — FENTANYL CITRATE 50 UG/ML
INJECTION, SOLUTION INTRAMUSCULAR; INTRAVENOUS AS NEEDED
Status: DISCONTINUED | OUTPATIENT
Start: 2017-08-01 | End: 2017-08-01 | Stop reason: HOSPADM

## 2017-08-01 RX ORDER — PROPOFOL 10 MG/ML
INJECTION, EMULSION INTRAVENOUS AS NEEDED
Status: DISCONTINUED | OUTPATIENT
Start: 2017-08-01 | End: 2017-08-01 | Stop reason: HOSPADM

## 2017-08-01 RX ORDER — LIDOCAINE HYDROCHLORIDE AND EPINEPHRINE 10; 10 MG/ML; UG/ML
30 INJECTION, SOLUTION INFILTRATION; PERINEURAL ONCE
Status: DISCONTINUED | OUTPATIENT
Start: 2017-08-01 | End: 2017-08-01 | Stop reason: HOSPADM

## 2017-08-01 RX ORDER — TRIAMCINOLONE ACETONIDE 40 MG/ML
40 INJECTION, SUSPENSION INTRA-ARTICULAR; INTRAMUSCULAR ONCE
Status: DISCONTINUED | OUTPATIENT
Start: 2017-08-01 | End: 2017-08-01 | Stop reason: HOSPADM

## 2017-08-01 RX ORDER — LIDOCAINE HYDROCHLORIDE 10 MG/ML
0.1 INJECTION, SOLUTION EPIDURAL; INFILTRATION; INTRACAUDAL; PERINEURAL AS NEEDED
Status: DISCONTINUED | OUTPATIENT
Start: 2017-08-01 | End: 2017-08-01 | Stop reason: HOSPADM

## 2017-08-01 RX ORDER — ONDANSETRON 2 MG/ML
4 INJECTION INTRAMUSCULAR; INTRAVENOUS AS NEEDED
Status: DISCONTINUED | OUTPATIENT
Start: 2017-08-01 | End: 2017-08-01 | Stop reason: HOSPADM

## 2017-08-01 RX ADMIN — SODIUM CHLORIDE, SODIUM LACTATE, POTASSIUM CHLORIDE, AND CALCIUM CHLORIDE 100 ML/HR: 600; 310; 30; 20 INJECTION, SOLUTION INTRAVENOUS at 09:42

## 2017-08-01 RX ADMIN — FENTANYL CITRATE 50 MCG: 50 INJECTION, SOLUTION INTRAMUSCULAR; INTRAVENOUS at 09:55

## 2017-08-01 RX ADMIN — PROPOFOL 50 MG: 10 INJECTION, EMULSION INTRAVENOUS at 10:00

## 2017-08-01 RX ADMIN — PROPOFOL 110 MCG/KG/MIN: 10 INJECTION, EMULSION INTRAVENOUS at 10:00

## 2017-08-01 RX ADMIN — ONDANSETRON 4 MG: 2 INJECTION INTRAMUSCULAR; INTRAVENOUS at 10:12

## 2017-08-01 RX ADMIN — MIDAZOLAM HYDROCHLORIDE 2 MG: 1 INJECTION, SOLUTION INTRAMUSCULAR; INTRAVENOUS at 09:54

## 2017-08-01 NOTE — PERIOP NOTES
1011  Pt awake, VSS. \"I am ready to get dressed. \" Pt dressed. Pt transfer to wheelchair. To Br with assistance. 1117  Pt returned to stretcher, positioned for comfort. Waiting for ride. 80  Son here. Discharge instructions reviewed with pt/son, verbalized understanding. Written instructions and prescription given to son. 18  Pt discharge to car accompanied by son.

## 2017-08-01 NOTE — H&P
HISTORY OF PRESENT ILLNESS  Mariel Nesbitt is a 49 y.o. female. HPI   ESTABLISHED patient here for follow-up of LEFT breast cancer.   Is doing well.  Has complaints of tenderness on a keloid at her PAC site.  Otherwise, has off/on pain in the breasts, but nothing that is constant.  Did have a LEFT mammogram for breast lumps that the patient was feeling 2017 which was normal other than fat necrosis.  Had a RIGHT mammogram 2017 for pain and lumps which also revealed fat necrosis (per patient, we do not have reports from [facility]).        10/13/16: LT lumpectomy with SNBx and RT reduction mammoplasty. LT breast: 3 cm, gr 3 IDC. 0/4 LN involved. Triple negative. Clear margins. pT2 pN0(sn) pMx. RT breast: Benign. 17: s/p adjuvant chemo (TAC x4) with Dr. Elana Dotson, stopped due to peripheral neuropathy. 2017: started XRT ( treatments) at [facility] [place].     RIGHT mammogram and ultrasound 17 at [facility] showed fat necrosis (per patient). LEFT mammogram and ultrasound 2017 at [facility] showed fat necrosis (per patient).             Past Medical History:   Diagnosis Date    Arthritis      Asthma      Cancer (HCC)       BREAST left    Chronic pain      Diabetes (HCC)      Dyslipidemia      IBS (irritable bowel syndrome)      Nausea & vomiting      Noncompliance      Obesity      Psychiatric disorder      S/P breast biopsy, left 16    Sinusitis      Stress at home                 Past Surgical History:   Procedure Laterality Date    HX BREAST LUMPECTOMY Left 10/13/2016     LEFT BREAST REDUCTION LUMPECTOMY, LEFT SENTINEL NODE BIOPSY, LEFT BRACKETED NEEDLE LOCALIZATION, LEFT BREAST RECONSTRUCTION performed by Landon Beltran MD at [address] HX BREAST RECONSTRUCTION Bilateral 10/13/2016     BREAST REDUCTION performed by Mell Reid MD at [address] HX  SECTION         X2    HX GYN         ABLATION    HX HERNIA REPAIR         AS INFANT    HX HYSTERECTOMY         full    HX ORTHOPAEDIC         L THUMB         Social History            Social History    Marital status: SINGLE       Spouse name: N/A    Number of children: N/A    Years of education: N/A          Occupational History    Not on file.           Social History Main Topics    Smoking status: Never Smoker    Smokeless tobacco: Never Used    Alcohol use No    Drug use: No    Sexual activity: Not on file           Other Topics Concern    Not on file          Social History Narrative     ** Merged History Encounter **                       Current Outpatient Prescriptions on File Prior to Visit   Medication Sig Dispense Refill    naproxen (NAPROSYN) 500 mg tablet TAKE 1 TABLET BY MOUTH TWICE DAILY WITH MEALS 60 Tab 6    buPROPion SR (WELLBUTRIN SR) 150 mg SR tablet TAKE 1 TABLET BY MOUTH TWICE DAILY 30 Tab 11    cetirizine (ZYRTEC) 10 mg tablet Take 1 Tab by mouth daily. 60 Tab 1    LIRAGLUTIDE (VICTOZA 2-HUGO SC) 1.8 mg by SubCUTAneous route daily.        ONE TOUCH DELICA 33 gauge misc TEST THREE TIMES DAILY 100 Lancet 11    ONETOUCH ULTRA TEST strip TEST 3 TIMES A  Strip 11    albuterol (PROVENTIL VENTOLIN) 2.5 mg /3 mL (0.083 %) nebulizer solution          dicyclomine (BENTYL) 10 mg capsule Take 2 Caps by mouth four (4) times daily. (Patient taking differently: Take 20 mg by mouth four (4) times daily. Patient stated she usually takes 10mg twice daily) 120 Cap 11    NOVOLOG MIX 70-30 FLEXPEN 100 unit/mL (70-30) inpn USE 15 UNITS BEFORE BREAKFAST AND DINNER 15 mL 11    KELLY PEN NEEDLE 32 gauge x 5/32\" ndle USE AS DIRECTED TWICE DAILY 100 Pen Needle 11    albuterol (PROVENTIL HFA, VENTOLIN HFA, PROAIR HFA) 90 mcg/actuation inhaler Take 2 Puffs by inhalation every four (4) hours as needed for Wheezing. 1 Inhaler 11    gabapentin (NEURONTIN) 300 mg capsule Take 1 Cap by mouth nightly.  Indications: NEUROPATHIC PAIN 30 Cap 0      No current facility-administered medications on file prior to visit.                Allergies   Allergen Reactions    Codeine Diarrhea    Pravastatin Myalgia       Terrible leg aching.  Theophyl Diarrhea and Nausea and Vomiting    Theophylline Diarrhea         OB History     Obstetric Comments     Menarche: 7. LMP: 2006. # of Children: 2. Age at Delivery of First Child: 22.   Hysterectomy/oophorectomy: yes/yes  Breast Bx: no.  Hx of Breast Feeding: no  BCP: yes [id number]. Hormone therapy: yes 2008-present.             ROS  Constitutional: Negative    HENT: Negative. Eyes: Negative. Respiratory: Negative. Cardiovascular: Negative. Gastrointestinal: Negative. Genitourinary: Negative. Musculoskeletal: Negative. Skin: Negative. Neurological: Negative. Endo/Heme/Allergies: Negative. Psychiatric/Behavioral: Negative.     Physical Exam   Cardiovascular: Normal rate and normal heart sounds. Pulmonary/Chest: Breath sounds normal. Right breast exhibits no inverted nipple, no mass, no nipple discharge, no skin change and no tenderness. Left breast exhibits no inverted nipple, no mass, no nipple discharge, no skin change and no tenderness. Breasts are symmetrical.       Lymphadenopathy:        Right cervical: No superficial cervical, no deep cervical and no posterior cervical adenopathy present. Left cervical: No superficial cervical, no deep cervical and no posterior cervical adenopathy present. Right axillary: No pectoral and no lateral adenopathy present. Left axillary: No pectoral and no lateral adenopathy present.        ASSESSMENT and PLAN      ICD-10-CM ICD-9-CM     1. Malignant neoplasm of lower-inner quadrant of left female breast ([facility] C50.312 174. 3        Pt s/p port removal and doing well. Addressed symptomatic keloid scar where port was removed, and discussed options including observation or surgical excision of scar followed by steroid injection.  Pt elected for surgery.     Will schedule excision of keloid scar with steroid injection. This plan was reviewed with the patient and patient agrees. All questions were answered.

## 2017-08-01 NOTE — BRIEF OP NOTE
BRIEF OPERATIVE NOTE    Date of Procedure: 8/1/2017   Preoperative Diagnosis: KELOID SCAR PORT SITE   Postoperative Diagnosis: KELOID SCAR PORT SITE     Procedure(s):  EXCISION KELOID SCAR AT PORT (RIGHT SIDE) W/ KENALOG INJECTION   Surgeon(s) and Role:     * Landon Beltran MD - Primary         Assistant Staff:       Surgical Staff:  Circ-1: Misha Murphy RN  Scrub Tech-1: Lauren Chong  Surg Asst-1: Danay Sloan  Surg Asst-2: Merna Linton  Float Staff: Nikita Kingston RN  Event Time In   Incision Start 1012   Incision Close 1021     Anesthesia: MAC   Estimated Blood Loss: minimal  Specimens:   ID Type Source Tests Collected by Time Destination   1 : excised keloid scar right chest Preservative Chest  Landon Beltran MD 6/6/4852 1016 Pathology      Findings: keloid scar   Complications: none  Implants: * No implants in log *

## 2017-08-01 NOTE — OP NOTES
Jt Fuentes [facility] 79   201 [facility], [address]   OP NOTE       Name:  Lawanda Bob   MR#:  [id number]   :  1970   Account #:  [de-identified]    Surgery Date:  2017   Date of Adm:  2017       PREOPERATIVE DIAGNOSIS: Symptomatic keloid scar of the Port-A-  Cath site. POSTOPERATIVE DIAGNOSIS: Symptomatic keloid scar of the [employer] site. PROCEDURE PERFORMED: Excision of keloid scar approximately 4   cm with injection of 40 mg Kenalog and primary closure. SURGEON: Amarilys Rivas. Lorence Ahumada., MD     ANESTHESIA: Local standby. SPECIMENS REMOVED: Keloids scar. ESTIMATED BLOOD LOSS: Minimal.    INDICATIONS: The patient is a 54-year-old female who has completed   chemotherapy and surgery and radiation for breast cancer, who has a   very symptomatic keloid scar in a Port-A-Cath site. DESCRIPTION OF PROCEDURE: After adequate IV sedation, sterile   prep and drape, local using 1% lidocaine mixed with 0.5% Marcaine,   an elliptical incision was made around the previous scar. This was   excised in its entirety with Bovie cautery. The wound was hemostatic. The skin and dermis were infiltrated with 40 mg of Kenalog which had   been diluted with 10 mL of injectable saline. After completely infiltrating   the skin, the incision was closed with a running 3-0 Prolene suture in a   subcuticular fashion. The patient tolerated the procedure well without any complications. She was taken to the recovery room in stable condition.         Jami Arguelles.MD AVENDAÑO / ANGELA   D:  2017   10:46   T:  2017   10:59   Job #:  [id number]

## 2017-08-01 NOTE — ANESTHESIA PREPROCEDURE EVALUATION
Anesthetic History     PONV          Review of Systems / Medical History  Patient summary reviewed, nursing notes reviewed and pertinent labs reviewed    Pulmonary            Asthma        Neuro/Psych         Psychiatric history     Cardiovascular    Hypertension                   GI/Hepatic/Renal  Within defined limits              Endo/Other    Diabetes    Morbid obesity, arthritis and cancer     Other Findings              Physical Exam    Airway  Mallampati: II  TM Distance: > 6 cm  Neck ROM: normal range of motion   Mouth opening: Normal     Cardiovascular  Regular rate and rhythm,  S1 and S2 normal,  no murmur, click, rub, or gallop             Dental  No notable dental hx       Pulmonary  Breath sounds clear to auscultation               Abdominal  GI exam deferred       Other Findings            Anesthetic Plan    ASA: 2  Anesthesia type: MAC          Induction: Intravenous  Anesthetic plan and risks discussed with: Patient

## 2017-08-01 NOTE — ANESTHESIA POSTPROCEDURE EVALUATION
Post-Anesthesia Evaluation and Assessment    Patient: Ny Dye MRN: [id number]  SSN: [id number]    YOB: 1970  Age: 49 y.o. Sex: female       Cardiovascular Function/Vital Signs  Visit Vitals    /52    Pulse 98    Temp 36.6 °C (97.8 °F)    Resp 21    Ht 5' 1\" (1.549 m)    Wt 112 kg (246 lb 14.6 oz)    SpO2 99%    BMI 46.65 kg/m2       Patient is status post MAC anesthesia for Procedure(s):  EXCISION KELOID SCAR AT PORT (RIGHT SIDE) W/ KENALOG INJECTION . Nausea/Vomiting: None    Postoperative hydration reviewed and adequate. Pain:  Pain Scale 1: Numeric (0 - 10) (08/01/17 1035)  Pain Intensity 1: 0 (08/01/17 1035)   Managed    Neurological Status:   Neuro (WDL): Exceptions to WDL (08/01/17 1041)  Neuro  Neurologic State: Confused; Eyes open to stimulus (08/01/17 1041)  LUE Motor Response: Purposeful (08/01/17 1041)  LLE Motor Response: Purposeful (08/01/17 1041)  RUE Motor Response: Purposeful (08/01/17 1041)  RLE Motor Response: Purposeful (08/01/17 1041)   At baseline    Mental Status and Level of Consciousness: Arousable    Pulmonary Status:   O2 Device: Room air (08/01/17 1035)   Adequate oxygenation and airway patent    Complications related to anesthesia: None    Post-anesthesia assessment completed.  No concerns    Signed By: Faheem Rivers MD     August 1, 2017

## 2017-08-01 NOTE — DISCHARGE INSTRUCTIONS
Discharge Instructions from Dr. Denny Talamantes    · I will call you with the pathology results, typically within 1 week from today. · You may shower, but no hot tubs, swimming pools, or baths until your incision is healed. · No heavy lifting with the affected extremity (nothing greater than 5 pounds), and limit its use for the next 4-5 days. · You may use an ice pack for comfort for the next couple of days, but do not place ice directly on the skin. Rather, use a towel or clothing to serve as a barrier between skin and ice to prevent injury. · If I placed a drain, follow the drain instructions provided, especially as you keep a record of the drain output. · Follow medication instructions carefully. · Watch for signs of infection as listed below. · Redness  · Swelling  · Drainage from the incision or from your nipple that appears infected  · Fever over 101 degrees for consecutive readings, or over 99.5 if you are currently undergoing chemotherapy. · Call our office (number is below) for a follow-up appointment. · If you have any problems, our phone number is [phone]. DISCHARGE SUMMARY from your Nurse    The following personal items collected during your admission are returned to you:   Dental Appliance: Dental Appliances: None  Vision: Visual Aid: None  Hearing Aid:    Jewelry:    Clothing:    Other Valuables:    Valuables sent to safe:      PATIENT INSTRUCTIONS:    After general anesthesia or intravenous sedation, for 24 hours or while taking prescription Narcotics:  · Limit your activities  · Do not drive and operate hazardous machinery  · Do not make important personal or business decisions  · Do  not drink alcoholic beverages  · If you have not urinated within 8 hours after discharge, please contact your surgeon on call.     Report the following to your surgeon:  · Excessive pain, swelling, redness or odor of or around the surgical area  · Temperature over 100.5  · Nausea and vomiting lasting longer than 4 hours or if unable to take medications  · Any signs of decreased circulation or nerve impairment to extremity: change in color, persistent  numbness, tingling, coldness or increase pain  · Any questions    COUGH AND DEEP BREATHE    Breathing deep and coughing are very important exercises to do after surgery. Deep breathing and coughing open the little air tubes and air sacks in your lungs. You take deep breaths every day. You may not even notice - it is just something you do when you sigh or yawn. It is a natural exercise you do to keep these air passages open. After surgery, take deep breaths and cough, on purpose. Coughing and deep breathing help prevent bronchitis and pneumonia after surgery. If you had chest or belly surgery, use a pillow as a \"hug andrei\" and hold it tightly to your chest or belly when you cough. DIRECTIONS:  6. Take 10 to 15 slow deep breaths every hour while awake. 7. Breathe in deeply, and hold it for 2 seconds. 8. Exhale slowly through puckered lips, like blowing up a balloon. 9. After every 4th or 5th deep breath, hug your pillow to your chest or belly and give a hard, deep cough. Yes, it will probably hurt. But doing this exercise is very important part of healing after surgery. Take your pain medicine to help you do this exercise without too much pain. IF YOU HAVE BEEN DIAGNOSED WITH SLEEP APNEA, PLEASE USE YOUR SLEEIFP APNEA DEVICE OR CPAP MACHINE WHEN YOU INTEND TO NAP AFTER TAKING PAIN MEDICATION. Ankle Pumps    Ankle pumps increase the circulation of oxygenated blood to your lower extremities and decrease your risk for circulation problems such as blood clots. They also stretch the muscles, tendons and ligaments in your foot and ankle, and prevent joint contracture in the ankle and foot, especially after surgeries on the legs.     It is important to do ankle pump exercises regularly after surgery because immobility increases your risk for developing a blood clot. Your doctor may also have you take an Aspirin for the next few days as well. If your doctor did not ask you to take an Aspirin, consult with him before starting Aspirin therapy on your own. Slowly point your foot forward, feeling the muscles on the top of your lower leg stretch, and hold this position for 5 seconds. Next, pull your foot back toward you as far as possible, stretching the calf muscles, and hold that position for 5 seconds. Repeat with the other foot. Perform 10 repetitions every hour while awake for both ankles if possible (down and then up with the foot once is one repetition). You should feel gentle stretching of the muscles in your lower leg when doing this exercise. If you feel pain, or your range of motion is limited, don't  Push too hard. Only go the limit your joint and muscles will let you go. If you have increasing pain, progressively worsening leg warmth or swelling, STOP the exercise and call your doctor. Below is information about the medications your doctor is prescribing after your visit:    Other information in your discharge envelope:  [x]     PRESCRIPTIONS  []     PHYSICAL THERAPY PRESCRIPTION  []     APPOINTMENT CARDS  []     Regional Anesthesia Pamphlet for block or block with On-Q Catheter from Anesthesia Service  []     Medical device information sheets/pamphlets from their    []     School/work excuse note. []     /parent work excuse note. These are general instructions for a healthy lifestyle:    *  Please give a list of your current medications to your Primary Care Provider. *  Please update this list whenever your medications are discontinued, doses are      changed, or new medications (including over-the-counter products) are added. *  Please carry medication information at all times in case of emergency situations.     About Smoking  No smoking / No tobacco products / Avoid exposure to second hand smoke    Surgeon General's Warning:  Quitting smoking now greatly reduces serious risk to your health. Obesity, smoking, and sedentary lifestyle greatly increases your risk for illness and disease. A healthy diet, regular physical exercise & weight monitoring are important for maintaining a healthy lifestyle. Congestive Heart Failure  You may be retaining fluid if you have a history of heart failure or if you experience any of the following symptoms:  Weight gain of 3 pounds or more overnight or 5 pounds in a week, increased swelling in our hands or feet or shortness of breath while lying flat in bed. Please call your doctor as soon as you notice any of these symptoms; do not wait until your next office visit. Recognize signs and symptoms of STROKE:  F - face looks uneven  A - arms unable to move or move even  S - speech slurred or non-existent  T - time-call 911 as soon as signs and symptoms begin-DO NOT go         Back to bed or wait to see if you get better-TIME IS BRAIN. Warning signs of HEART ATTACK  Call 911 if you have these symptoms    · Chest discomfort. Most heart attacks involve discomfort in the center of the chest that lasts more than a few minutes, or that goes away and comes back. It can feel like uncomfortable pressure, squeezing, fullness, or pain. · Discomfort in other areas of the upper body. Symptoms can include pain or discomfort in one or both        Arms, the back, neck, jaw, or stomach. ·  Shortness of breath with or without chest discomfort. · Other signs may include breaking out in a cold sweat, nausea, or lightheadedness    Don't wait more than five minutes to call 911 - MINUTES MATTER! Fast action can save your life. Calling 911 is almost always the fastest way to get lifesaving treatment.   Emergency Medical Services staff can begin treatment when they arrive - up to an hour sooner than if someone gets to the hospital by car. [facility] MEDICATION AND SIDE EFFECT GUIDE    The [facility] MEDICATION AND SIDE EFFECT GUIDE was provided to the PATIENT AND CARE PROVIDER.   Information provided includes instruction about drug purpose and common side effects for the following medications:    · norco

## 2017-08-01 NOTE — IP AVS SNAPSHOT
Kaye Mcdaniel 
 
 
 [address] [facility] 
[phone] Patient: Henry Mckeon MRN: [id number] :1970 You are allergic to the following Allergen Reactions Codeine Diarrhea Pravastatin Myalgia Terrible leg aching. Theophylline Diarrhea Recent Documentation Height Weight BMI OB Status Smoking Status 1.549 m 112 kg 46.65 kg/m2 Hysterectomy Never Smoker Emergency Contacts Name Discharge Info Relation Home Work Mobile [facility] DISCHARGE CAREGIVER [3] Mother [14] [phone] About your hospitalization You were admitted on:  2017 You last received care in the:  [facility] You were discharged on:  2017 Unit phone number:  [phone] Why you were hospitalized Your primary diagnosis was:  Not on File Providers Seen During Your Hospitalizations Provider Role Specialty Primary office phone Darcy Cooks., MD Attending Provider Breast Surgery [phone] Your Primary Care Physician (PCP) Primary Care Physician Office Phone Office Fax Guzmannoe Calvo [phone] Follow-up Information Follow up With Details Comments Contact Info Rossi Grossman MD   [facility] [address] [place] [address] 
[phone] Your Appointments 2017  1:30 PM EDT Acupuncture Est Pt with Dulce Martin MD  
[address] at [facility]) [address] [facility] [address] [phone] 2017  1:45 PM EDT  
POST OP with Darcy Cooks., MD  
[facility] [place] [address] [facility]  
[phone] Current Discharge Medication List  
  
 START taking these medications Dose & Instructions Dispensing Information Comments Morning Noon Evening Bedtime HYDROcodone-acetaminophen 7.5-325 mg per tablet Commonly known as:  Haim Stearns Your last dose was: Your next dose is:    
   
   
 Dose:  1 Tab Take 1 Tab by mouth every four (4) hours as needed for Pain. Max Daily Amount: 6 Tabs. Quantity:  20 Tab Refills:  0 CONTINUE these medications which have NOT CHANGED Dose & Instructions Dispensing Information Comments Morning Noon Evening Bedtime * albuterol 90 mcg/actuation inhaler Commonly known as:  PROVENTIL HFA, VENTOLIN HFA, PROAIR HFA Your last dose was: Your next dose is:    
   
   
 Dose:  2 Puff Take 2 Puffs by inhalation every four (4) hours as needed for Wheezing. Quantity:  1 Inhaler Refills:  11  
     
   
   
   
  
 * albuterol 2.5 mg /3 mL (0.083 %) nebulizer solution Commonly known as:  PROVENTIL VENTOLIN Your last dose was: Your next dose is:    
   
   
 every four (4) hours as needed. Refills:  0 BENTYL 10 mg capsule Generic drug:  dicyclomine Your last dose was: Your next dose is:    
   
   
 Dose:  20 mg Take 20 mg by mouth daily as needed. Refills:  0  
     
   
   
   
  
 buPROPion  mg SR tablet Commonly known as:  Melly Israele Your last dose was: Your next dose is: TAKE 1 TABLET BY MOUTH TWICE DAILY Quantity:  30 Tab Refills:  11  
     
   
   
   
  
 cetirizine 10 mg tablet Commonly known as:  ZYRTEC Your last dose was: Your next dose is:    
   
   
 Dose:  10 mg Take 1 Tab by mouth daily. Quantity:  60 Tab Refills:  1 Mary Pen Needle 32 gauge x 5/32\" Ndle Generic drug:  Insulin Needles (Disposable) Your last dose was: Your next dose is: USE AS DIRECTED TWICE DAILY Quantity:  100 Pen Needle Refills:  11  
     
   
   
   
  
 naproxen 500 mg tablet Commonly known as:  NAPROSYN Your last dose was: Your next dose is: TAKE 1 TABLET BY MOUTH TWICE DAILY WITH MEALS Quantity:  60 Tab Refills:  6 NovoLOG Mix 70-30 FlexPen 100 unit/mL (70-30) Inpn Generic drug:  insulin aspart protamine/insulin aspart Your last dose was: Your next dose is:    
   
   
 USE 15 UNITS BEFORE BREAKFAST AND DINNER Quantity:  15 mL Refills:  11 One Touch Delica 33 gauge Misc Generic drug:  lancets Your last dose was: Your next dose is:    
   
   
 TEST THREE TIMES DAILY Quantity:  100 Lancet Refills:  11 ONETOUCH ULTRA TEST strip Generic drug:  glucose blood VI test strips Your last dose was: Your next dose is:    
   
   
 TEST 3 TIMES A DAY Quantity:  100 Strip Refills:  11 VICTOZA 2-HUGO SC Your last dose was: Your next dose is:    
   
   
 Dose:  1.8 mg  
1.8 mg by SubCUTAneous route daily. Refills:  0  
     
   
   
   
  
 * Notice: This list has 2 medication(s) that are the same as other medications prescribed for you. Read the directions carefully, and ask your doctor or other care provider to review them with you. Where to Get Your Medications Information on where to get these meds will be given to you by the nurse or doctor. ! Ask your nurse or doctor about these medications HYDROcodone-acetaminophen 7.5-325 mg per tablet Discharge Instructions Discharge Instructions from Dr. Ulises Nolasco · I will call you with the pathology results, typically within 1 week from today. · You may shower, but no hot tubs, swimming pools, or baths until your incision is healed. · No heavy lifting with the affected extremity (nothing greater than 5 pounds), and limit its use for the next 4-5 days. · You may use an ice pack for comfort for the next couple of days, but do not place ice directly on the skin. Rather, use a towel or clothing to serve as a barrier between skin and ice to prevent injury. · If I placed a drain, follow the drain instructions provided, especially as you keep a record of the drain output. · Follow medication instructions carefully. · Watch for signs of infection as listed below. · Redness · Swelling · Drainage from the incision or from your nipple that appears infected · Fever over 101 degrees for consecutive readings, or over 99.5 if you are currently undergoing chemotherapy. · Call our office (number is below) for a follow-up appointment. · If you have any problems, our phone number is [phone]. DISCHARGE SUMMARY from your Nurse The following personal items collected during your admission are returned to you:  
Dental Appliance: Dental Appliances: None Vision: Visual Aid: None Hearing Aid:   
Jewelry:   
Clothing:   
Other Valuables:   
Valuables sent to safe:   
 
PATIENT INSTRUCTIONS: 
 
After general anesthesia or intravenous sedation, for 24 hours or while taking prescription Narcotics: · Limit your activities · Do not drive and operate hazardous machinery · Do not make important personal or business decisions · Do  not drink alcoholic beverages · If you have not urinated within 8 hours after discharge, please contact your surgeon on call. Report the following to your surgeon: 
· Excessive pain, swelling, redness or odor of or around the surgical area · Temperature over 100.5 · Nausea and vomiting lasting longer than 4 hours or if unable to take medications · Any signs of decreased circulation or nerve impairment to extremity: change in color, persistent  numbness, tingling, coldness or increase pain · Any questions [address] Breathing deep and coughing are very important exercises to do after surgery. Deep breathing and coughing open the little air tubes and air sacks in your lungs. You take deep breaths every day. You may not even notice - it is just something you do when you sigh or yawn. It is a natural exercise you do to keep these air passages open. After surgery, take deep breaths and cough, on purpose. Coughing and deep breathing help prevent bronchitis and pneumonia after surgery. If you had chest or belly surgery, use a pillow as a \"hug andrei\" and hold it tightly to your chest or belly when you cough. DIRECTIONS: 
6. Take 10 to 15 slow deep breaths every hour while awake. 7. Breathe in deeply, and hold it for 2 seconds. 8. Exhale slowly through puckered lips, like blowing up a balloon. 9. After every 4th or 5th deep breath, hug your pillow to your chest or belly and give a hard, deep cough. Yes, it will probably hurt. But doing this exercise is very important part of healing after surgery. Take your pain medicine to help you do this exercise without too much pain. IF YOU HAVE BEEN DIAGNOSED WITH SLEEP APNEA, PLEASE USE YOUR SLEEIFP APNEA DEVICE OR CPAP MACHINE WHEN YOU INTEND TO NAP AFTER TAKING PAIN MEDICATION. Ankle Pumps Ankle pumps increase the circulation of oxygenated blood to your lower extremities and decrease your risk for circulation problems such as blood clots. They also stretch the muscles, tendons and ligaments in your foot and ankle, and prevent joint contracture in the ankle and foot, especially after surgeries on the legs. It is important to do ankle pump exercises regularly after surgery because immobility increases your risk for developing a blood clot. Your doctor may also have you take an Aspirin for the next few days as well.  
 
If your doctor did not ask you to take an Aspirin, consult with him before starting Aspirin therapy on your own. Slowly point your foot forward, feeling the muscles on the top of your lower leg stretch, and hold this position for 5 seconds. Next, pull your foot back toward you as far as possible, stretching the calf muscles, and hold that position for 5 seconds. Repeat with the other foot. Perform 10 repetitions every hour while awake for both ankles if possible (down and then up with the foot once is one repetition). You should feel gentle stretching of the muscles in your lower leg when doing this exercise. If you feel pain, or your range of motion is limited, don't  Push too hard. Only go the limit your joint and muscles will let you go. If you have increasing pain, progressively worsening leg warmth or swelling, STOP the exercise and call your doctor. Below is information about the medications your doctor is prescribing after your visit: 
 
 
· norco 
 
 
 
 
 
 
 
 
Discharge Orders None Introducing [facility]! Dear Brenda Hooper: 
Thank you for requesting a [employer] account. Our records indicate that you already have an active [employer] account. You can access your account anytime at [email or web address] Did you know that you can access your hospital and ER discharge instructions at any time in [employer]? You can also review all of your test results from your hospital stay or ER visit. Additional Information If you have questions, please visit the Frequently Asked Questions section of the [employer] website at [email or web address] Remember, [employer] is NOT to be used for urgent needs. For medical emergencies, dial 911. Now available from your iPhone and Android! General Information Please provide this summary of care documentation to your next provider. Patient Signature:  ____________________________________________________________ Date:  ____________________________________________________________  
  
[facility] Provider Signature:  ____________________________________________________________ Date:  ____________________________________________________________

## 2017-08-02 ENCOUNTER — TELEPHONE (OUTPATIENT)
Dept: SURGERY | Age: 47
End: 2017-08-02

## 2017-08-02 ENCOUNTER — OFFICE VISIT (OUTPATIENT)
Dept: SURGERY | Age: 47
End: 2017-08-02

## 2017-08-02 VITALS — HEIGHT: 61 IN | BODY MASS INDEX: 46.44 KG/M2 | WEIGHT: 246 LBS

## 2017-08-02 DIAGNOSIS — C50.312 MALIGNANT NEOPLASM OF LOWER-INNER QUADRANT OF LEFT FEMALE BREAST (HCC): Primary | ICD-10-CM

## 2017-08-02 RX ORDER — GABAPENTIN 300 MG/1
CAPSULE ORAL
Refills: 0 | COMMUNITY
Start: 2017-06-19 | End: 2017-11-08 | Stop reason: ALTCHOICE

## 2017-08-02 NOTE — PROGRESS NOTES
HISTORY OF PRESENT ILLNESS  Monse Velasquez is a 49 y.o. female. HPI  ESTABLISHED patient here on POD #1 after port-a-cath scar revision of the RIGHT chest.  Patient noticed this morning that her scar had opened up. She can see the sutures underneath. Is not having any pain and feels well today.      10/13/16: LT lumpectomy with SNBx and RT reduction mammoplasty. LT breast: 3 cm, gr 3 IDC. 0/4 LN involved. Triple negative. Clear margins. pT2 pN0(sn) pMx. RT breast: Benign. 01/31/17: s/p adjuvant chemo (TAC x4) with Dr. Dinah Penaloza, stopped due to peripheral neuropathy. 03/2017: started XRT (6/30 treatments) at [facility]. Past Medical History:   Diagnosis Date    Arthritis     Asthma     Cancer ([facility] [address]     BREAST left    Chronic pain     lower extremities    Diabetes ([facility] [address] 2012    Dyslipidemia     IBS (irritable bowel syndrome)     Nausea & vomiting     Noncompliance     Obesity     Psychiatric disorder     S/P breast biopsy, left 8/1/16    Sinusitis     Stress at home        Past Surgical History:   Procedure Laterality Date    HX BREAST LUMPECTOMY Left 10/13/2016    LEFT BREAST REDUCTION LUMPECTOMY, LEFT SENTINEL NODE BIOPSY, LEFT BRACKETED NEEDLE LOCALIZATION, LEFT BREAST RECONSTRUCTION performed by Paola Alvarez., MD at [address] [place] [address] Bilateral 10/13/2016    BREAST REDUCTION performed by Jennifer Alicia MD at [place]  [phone]    X2    HX GYN  2002    ABLATION    HX HERNIA REPAIR       umbilical as infant    HX HYSTERECTOMY  2008    rosangela bso    HX ORTHOPAEDIC Left 2006    left dequervains    HX VASCULAR ACCESS Right 11/2016,4/2017    insertiona and removal of power port       Social History     Social History    Marital status: SINGLE     Spouse name: N/A    Number of children: N/A    Years of education: N/A     Occupational History    Not on file.      Social History Main Topics    Smoking status: Never Smoker    Smokeless tobacco: Never Used    Alcohol use No    Drug use: No    Sexual activity: Not on file     Other Topics Concern    Not on file     Social History Narrative    ** Merged History Encounter **            Current Outpatient Prescriptions on File Prior to Visit   Medication Sig Dispense Refill    HYDROcodone-acetaminophen (NORCO) 7.5-325 mg per tablet Take 1 Tab by mouth every four (4) hours as needed for Pain. Max Daily Amount: 6 Tabs. 20 Tab 0    dicyclomine (BENTYL) 10 mg capsule Take 20 mg by mouth daily as needed.  buPROPion SR (WELLBUTRIN SR) 150 mg SR tablet TAKE 1 TABLET BY MOUTH TWICE DAILY 30 Tab 11    cetirizine (ZYRTEC) 10 mg tablet Take 1 Tab by mouth daily. 60 Tab 1    LIRAGLUTIDE (VICTOZA 2-HUGO SC) 1.8 mg by SubCUTAneous route daily.  ONE TOUCH DELICA 33 gauge misc TEST THREE TIMES DAILY 100 Lancet 11    ONETOUCH ULTRA TEST strip TEST 3 TIMES A  Strip 11    albuterol (PROVENTIL VENTOLIN) 2.5 mg /3 mL (0.083 %) nebulizer solution every four (4) hours as needed.  NOVOLOG MIX 70-30 FLEXPEN 100 unit/mL (70-30) inpn USE 15 UNITS BEFORE BREAKFAST AND DINNER 15 mL 11    KELLY PEN NEEDLE 32 gauge x 5/32\" ndle USE AS DIRECTED TWICE DAILY 100 Pen Needle 11    albuterol (PROVENTIL HFA, VENTOLIN HFA, PROAIR HFA) 90 mcg/actuation inhaler Take 2 Puffs by inhalation every four (4) hours as needed for Wheezing.  1 Inhaler 11    naproxen (NAPROSYN) 500 mg tablet TAKE 1 TABLET BY MOUTH TWICE DAILY WITH MEALS 60 Tab 6     Current Facility-Administered Medications on File Prior to Visit   Medication Dose Route Frequency Provider Last Rate Last Dose    [DISCONTINUED] lactated Ringers infusion  125 mL/hr IntraVENous CONTINUOUS Jasmyn Hinton MD        [DISCONTINUED] sodium chloride (NS) flush 5-10 mL  5-10 mL IntraVENous PRN Jasmyn Hinton MD        [DISCONTINUED] HYDROmorphone (PF) (DILAUDID) injection 0.25-1 mg  0.25-1 mg IntraVENous Q10MIN PRN Jasmyn Hinton MD  [DISCONTINUED] ondansetron (ZOFRAN) injection 4 mg  4 mg IntraVENous PRN Alena Flower MD        [DISCONTINUED] diphenhydrAMINE (BENADRYL) injection 12.5 mg  12.5 mg IntraVENous PRN Alena Flower MD        [DISCONTINUED] triamcinolone acetonide (KENALOG-40) 40 mg/mL injection 40 mg  40 mg Other ONCE Carlos Keller MD           Allergies   Allergen Reactions    Codeine Diarrhea    Pravastatin Myalgia     Terrible leg aching.  Theophylline Diarrhea       OB History     Obstetric Comments    Menarche: 7. LMP: 2006. # of Children: 2. Age at Delivery of First Child: 22.   Hysterectomy/oophorectomy: yes/yes  Breast Bx: no.  Hx of Breast Feeding: no  BCP: yes [id number]. Hormone therapy: yes 2008-present. ROS  Constitutional: Negative    HENT: Negative. Eyes: Negative. Respiratory: Negative. Cardiovascular: Negative. Gastrointestinal: Negative. Genitourinary: Negative. Musculoskeletal: Negative. Skin: Negative. Neurological: Negative. Endo/Heme/Allergies: Negative. Psychiatric/Behavioral: Negative. Physical Exam   Cardiovascular: Normal rate and normal heart sounds. Pulmonary/Chest: Breath sounds normal. Right breast exhibits no inverted nipple, no mass, no nipple discharge, no skin change and no tenderness. Left breast exhibits no inverted nipple, no mass, no nipple discharge, no skin change and no tenderness. Breasts are symmetrical.       Lymphadenopathy:        Right cervical: No superficial cervical, no deep cervical and no posterior cervical adenopathy present. Left cervical: No superficial cervical, no deep cervical and no posterior cervical adenopathy present. Right axillary: No pectoral and no lateral adenopathy present. Left axillary: No pectoral and no lateral adenopathy present.       ASSESSMENT and PLAN    ICD-10-CM ICD-9-CM    1. Malignant neoplasm of lower-inner quadrant of left female breast (HCC) C50.312 174.3 Pt s/p excision of keloid scar with kenalog injection and presents with  incision. Tightened suture today without complications pulling incision together, which pt tolerated well. Secured site with steristrips. F/u Monday for stitch removal. This plan was reviewed with the patient and patient agrees. All questions were answered.     Written by Marc Rivera, as dictated by Dr. Naida Apgar, MD.

## 2017-08-02 NOTE — COMMUNICATION BODY
HISTORY OF PRESENT ILLNESS  Tk Peters is a 49 y.o. female. HPI  ESTABLISHED patient here on POD #1 after port-a-cath scar revision of the RIGHT chest.  Patient noticed this morning that her scar had opened up. She can see the sutures underneath. Is not having any pain and feels well today.      10/13/16: LT lumpectomy with SNBx and RT reduction mammoplasty. LT breast: 3 cm, gr 3 IDC. 0/4 LN involved. Triple negative. Clear margins. pT2 pN0(sn) pMx. RT breast: Benign. 01/31/17: s/p adjuvant chemo (TAC x4) with Dr. Nataliia Siegel, stopped due to peripheral neuropathy. 03/2017: started XRT (6/30 treatments) at [facility]. Past Medical History:   Diagnosis Date    Arthritis     Asthma     Cancer ([facility] [address]     BREAST left    Chronic pain     lower extremities    Diabetes ([facility] [address] 2012    Dyslipidemia     IBS (irritable bowel syndrome)     Nausea & vomiting     Noncompliance     Obesity     Psychiatric disorder     S/P breast biopsy, left 8/1/16    Sinusitis     Stress at home        Past Surgical History:   Procedure Laterality Date    HX BREAST LUMPECTOMY Left 10/13/2016    LEFT BREAST REDUCTION LUMPECTOMY, LEFT SENTINEL NODE BIOPSY, LEFT BRACKETED NEEDLE LOCALIZATION, LEFT BREAST RECONSTRUCTION performed by Rain Iglesias MD at [address] [place] [address] Bilateral 10/13/2016    BREAST REDUCTION performed by Trev Bowles MD at [place]  [phone]    X2    HX GYN  2002    ABLATION    HX HERNIA REPAIR       umbilical as infant    HX HYSTERECTOMY  2008    rosangela bso    HX ORTHOPAEDIC Left 2006    left dequervains    HX VASCULAR ACCESS Right 11/2016,4/2017    insertiona and removal of power port       Social History     Social History    Marital status: SINGLE     Spouse name: N/A    Number of children: N/A    Years of education: N/A     Occupational History    Not on file.      Social History Main Topics    Smoking status: Never Smoker    Smokeless tobacco: Never Used    Alcohol use No    Drug use: No    Sexual activity: Not on file     Other Topics Concern    Not on file     Social History Narrative    ** Merged History Encounter **            Current Outpatient Prescriptions on File Prior to Visit   Medication Sig Dispense Refill    HYDROcodone-acetaminophen (NORCO) 7.5-325 mg per tablet Take 1 Tab by mouth every four (4) hours as needed for Pain. Max Daily Amount: 6 Tabs. 20 Tab 0    dicyclomine (BENTYL) 10 mg capsule Take 20 mg by mouth daily as needed.  buPROPion SR (WELLBUTRIN SR) 150 mg SR tablet TAKE 1 TABLET BY MOUTH TWICE DAILY 30 Tab 11    cetirizine (ZYRTEC) 10 mg tablet Take 1 Tab by mouth daily. 60 Tab 1    LIRAGLUTIDE (VICTOZA 2-HUGO SC) 1.8 mg by SubCUTAneous route daily.  ONE TOUCH DELICA 33 gauge misc TEST THREE TIMES DAILY 100 Lancet 11    ONETOUCH ULTRA TEST strip TEST 3 TIMES A  Strip 11    albuterol (PROVENTIL VENTOLIN) 2.5 mg /3 mL (0.083 %) nebulizer solution every four (4) hours as needed.  NOVOLOG MIX 70-30 FLEXPEN 100 unit/mL (70-30) inpn USE 15 UNITS BEFORE BREAKFAST AND DINNER 15 mL 11    KELLY PEN NEEDLE 32 gauge x 5/32\" ndle USE AS DIRECTED TWICE DAILY 100 Pen Needle 11    albuterol (PROVENTIL HFA, VENTOLIN HFA, PROAIR HFA) 90 mcg/actuation inhaler Take 2 Puffs by inhalation every four (4) hours as needed for Wheezing.  1 Inhaler 11    naproxen (NAPROSYN) 500 mg tablet TAKE 1 TABLET BY MOUTH TWICE DAILY WITH MEALS 60 Tab 6     Current Facility-Administered Medications on File Prior to Visit   Medication Dose Route Frequency Provider Last Rate Last Dose    [DISCONTINUED] lactated Ringers infusion  125 mL/hr IntraVENous CONTINUOUS Skylar Briseno MD        [DISCONTINUED] sodium chloride (NS) flush 5-10 mL  5-10 mL IntraVENous PRN Skylar Briseno MD        [DISCONTINUED] HYDROmorphone (PF) (DILAUDID) injection 0.25-1 mg  0.25-1 mg IntraVENous Q10MIN PRN Skylar Briseno MD  [DISCONTINUED] ondansetron (ZOFRAN) injection 4 mg  4 mg IntraVENous PRN Matias Sheets MD        [DISCONTINUED] diphenhydrAMINE (BENADRYL) injection 12.5 mg  12.5 mg IntraVENous PRN Matias Sheets MD        [DISCONTINUED] triamcinolone acetonide (KENALOG-40) 40 mg/mL injection 40 mg  40 mg Other ONCE Leila Emmanuel MD           Allergies   Allergen Reactions    Codeine Diarrhea    Pravastatin Myalgia     Terrible leg aching.  Theophylline Diarrhea       OB History     Obstetric Comments    Menarche: 7. LMP: 2006. # of Children: 2. Age at Delivery of First Child: 22.   Hysterectomy/oophorectomy: yes/yes  Breast Bx: no.  Hx of Breast Feeding: no  BCP: yes [id number]. Hormone therapy: yes 2008-present. ROS  Constitutional: Negative    HENT: Negative. Eyes: Negative. Respiratory: Negative. Cardiovascular: Negative. Gastrointestinal: Negative. Genitourinary: Negative. Musculoskeletal: Negative. Skin: Negative. Neurological: Negative. Endo/Heme/Allergies: Negative. Psychiatric/Behavioral: Negative. Physical Exam   Cardiovascular: Normal rate and normal heart sounds. Pulmonary/Chest: Breath sounds normal. Right breast exhibits no inverted nipple, no mass, no nipple discharge, no skin change and no tenderness. Left breast exhibits no inverted nipple, no mass, no nipple discharge, no skin change and no tenderness. Breasts are symmetrical.       Lymphadenopathy:        Right cervical: No superficial cervical, no deep cervical and no posterior cervical adenopathy present. Left cervical: No superficial cervical, no deep cervical and no posterior cervical adenopathy present. Right axillary: No pectoral and no lateral adenopathy present. Left axillary: No pectoral and no lateral adenopathy present.       ASSESSMENT and PLAN    ICD-10-CM ICD-9-CM    1. Malignant neoplasm of lower-inner quadrant of left female breast (HCC) C50.312 174.3 Pt s/p excision of keloid scar with kenalog injection and presents with  incision. Tightened suture today without complications pulling incision together, which pt tolerated well. Secured site with steristrips. F/u Monday for stitch removal. This plan was reviewed with the patient and patient agrees. All questions were answered.     Written by Addie Herman, as dictated by Dr. Hugo Wilkinson MD.

## 2017-08-02 NOTE — TELEPHONE ENCOUNTER
The patient called stating she had scar revision surgery yesterday and she is concerned because she thinks her incision has opened up. It is lightly bleeding , but it is opened enough that she can see the \"sutures underneath the incision. \" Dr. Kenyatta Amaya made aware and would like the patient to be seen today. The patient was notified and she will come in to [facility] at 130pm today.

## 2017-08-02 NOTE — ADDENDUM NOTE
Addendum  created 08/02/17 0909 by Elayne Waldrop CRNA    Anesthesia Event edited, Anesthesia Intra Flowsheets edited, Procedure Event Log accessed

## 2017-08-07 ENCOUNTER — TELEPHONE (OUTPATIENT)
Dept: ONCOLOGY | Age: 47
End: 2017-08-07

## 2017-08-07 ENCOUNTER — OFFICE VISIT (OUTPATIENT)
Dept: SURGERY | Age: 47
End: 2017-08-07

## 2017-08-07 ENCOUNTER — DOCUMENTATION ONLY (OUTPATIENT)
Dept: ONCOLOGY | Age: 47
End: 2017-08-07

## 2017-08-07 VITALS
BODY MASS INDEX: 46.44 KG/M2 | DIASTOLIC BLOOD PRESSURE: 81 MMHG | HEART RATE: 95 BPM | SYSTOLIC BLOOD PRESSURE: 140 MMHG | WEIGHT: 246 LBS | HEIGHT: 61 IN

## 2017-08-07 DIAGNOSIS — L91.0 KELOID SCAR: Primary | ICD-10-CM

## 2017-08-07 DIAGNOSIS — C50.312 MALIGNANT NEOPLASM OF LOWER-INNER QUADRANT OF LEFT FEMALE BREAST (HCC): ICD-10-CM

## 2017-08-07 NOTE — PROGRESS NOTES
HISTORY OF PRESENT ILLNESS  Bruno Cabrera is a 49 y.o. female. HPI  ESTABLISHED patient here for removal of sutures from incision, s/p port-a-cath scar revision of the RIGHT chest. Last week the incision  and she came in to have sutures tightened and steri strips placed. Incision is dry and intact. No redness or swelling. Denies pain.      10/13/16: LT lumpectomy with SNBx and RT reduction mammoplasty. LT breast: 3 cm, gr 3 IDC. 0/4 LN involved. Triple negative. Clear margins. pT2 pN0(sn) pMx. RT breast: Benign.      01/31/17: s/p adjuvant chemo (TAC x4) with Dr. Colt Rolle, stopped due to peripheral neuropathy.      03/2017: started XRT (6/30 treatments) at [facility]. Past Medical History:   Diagnosis Date    Arthritis     Asthma     Cancer ([facility] [address]     BREAST left    Chronic pain     lower extremities    Diabetes ([facility] [address] 2012    Dyslipidemia     IBS (irritable bowel syndrome)     Nausea & vomiting     Noncompliance     Obesity     Psychiatric disorder     S/P breast biopsy, left 8/1/16    Sinusitis     Stress at home        Past Surgical History:   Procedure Laterality Date    HX BREAST LUMPECTOMY Left 10/13/2016    LEFT BREAST REDUCTION LUMPECTOMY, LEFT SENTINEL NODE BIOPSY, LEFT BRACKETED NEEDLE LOCALIZATION, LEFT BREAST RECONSTRUCTION performed by Neal Moreno MD at [address] [place] [address] Bilateral 10/13/2016    BREAST REDUCTION performed by Renna Holter, MD at [place]  [phone]    X2    HX GYN  2002    ABLATION    HX HERNIA REPAIR       umbilical as infant    HX HYSTERECTOMY  2008    rosangela bso    HX ORTHOPAEDIC Left 2006    left dequervains    HX VASCULAR ACCESS Right 11/2016,4/2017    insertiona and removal of power port       Social History     Social History    Marital status: SINGLE     Spouse name: N/A    Number of children: N/A    Years of education: N/A     Occupational History    Not on file. Social History Main Topics    Smoking status: Never Smoker    Smokeless tobacco: Never Used    Alcohol use No    Drug use: No    Sexual activity: Not on file     Other Topics Concern    Not on file     Social History Narrative    ** Merged History Encounter **            Current Outpatient Prescriptions on File Prior to Visit   Medication Sig Dispense Refill    gabapentin (NEURONTIN) 300 mg capsule TK 1 C PO Q NIGHT FOR NEUROPATHIC PAIN  0    dicyclomine (BENTYL) 10 mg capsule Take 20 mg by mouth daily as needed.  naproxen (NAPROSYN) 500 mg tablet TAKE 1 TABLET BY MOUTH TWICE DAILY WITH MEALS 60 Tab 6    buPROPion SR (WELLBUTRIN SR) 150 mg SR tablet TAKE 1 TABLET BY MOUTH TWICE DAILY 30 Tab 11    cetirizine (ZYRTEC) 10 mg tablet Take 1 Tab by mouth daily. 60 Tab 1    LIRAGLUTIDE (VICTOZA 2-HUGO SC) 1.8 mg by SubCUTAneous route daily.  ONE TOUCH DELICA 33 gauge misc TEST THREE TIMES DAILY 100 Lancet 11    ONETOUCH ULTRA TEST strip TEST 3 TIMES A  Strip 11    albuterol (PROVENTIL VENTOLIN) 2.5 mg /3 mL (0.083 %) nebulizer solution every four (4) hours as needed.  NOVOLOG MIX 70-30 FLEXPEN 100 unit/mL (70-30) inpn USE 15 UNITS BEFORE BREAKFAST AND DINNER 15 mL 11    KELLY PEN NEEDLE 32 gauge x 5/32\" ndle USE AS DIRECTED TWICE DAILY 100 Pen Needle 11    albuterol (PROVENTIL HFA, VENTOLIN HFA, PROAIR HFA) 90 mcg/actuation inhaler Take 2 Puffs by inhalation every four (4) hours as needed for Wheezing. 1 Inhaler 11    HYDROcodone-acetaminophen (NORCO) 7.5-325 mg per tablet Take 1 Tab by mouth every four (4) hours as needed for Pain. Max Daily Amount: 6 Tabs. 20 Tab 0     No current facility-administered medications on file prior to visit. Allergies   Allergen Reactions    Codeine Diarrhea    Pravastatin Myalgia     Terrible leg aching.  Theophylline Diarrhea       OB History     Obstetric Comments    Menarche: 7. LMP: 2006. # of Children: 2.   Age at Delivery of First Child: 22.   Hysterectomy/oophorectomy: yes/yes  Breast Bx: no.  Hx of Breast Feeding: no  BCP: yes [id number]. Hormone therapy: yes 2008-present. ROS  Constitutional: Negative    HENT: Negative. Eyes: Negative. Respiratory: Negative. Cardiovascular: Negative. Gastrointestinal: Negative. Genitourinary: Negative. Musculoskeletal: Negative. Skin: Negative. Neurological: Negative. Endo/Heme/Allergies: Negative. Psychiatric/Behavioral: Negative. Physical Exam   Cardiovascular: Normal rate and normal heart sounds. Pulmonary/Chest: Breath sounds normal. Right breast exhibits no inverted nipple, no mass, no nipple discharge, no skin change and no tenderness. Left breast exhibits no inverted nipple, no mass, no nipple discharge, no skin change and no tenderness. Breasts are symmetrical.       Lymphadenopathy:        Right cervical: No superficial cervical, no deep cervical and no posterior cervical adenopathy present. Left cervical: No superficial cervical, no deep cervical and no posterior cervical adenopathy present. Right axillary: No pectoral and no lateral adenopathy present. Left axillary: No pectoral and no lateral adenopathy present. ASSESSMENT and PLAN    ICD-10-CM ICD-9-CM    1. Keloid scar L91.0 701.4    2. Malignant neoplasm of lower-inner quadrant of left female breast ([facility] [address] C50.312 174.3      Pt s/p excision of keloid scar and is here today for stitch removal. Removed stitch today without complications, which pt tolerated well. Discussed benign pathology. Will cancel postop appt on 9/11 and f/u in 6 months. This plan was reviewed with the patient and patient agrees. All questions were answered.     Written by Louie Bullock, as dictated by Dr. Martha Metzger MD.

## 2017-08-07 NOTE — PROGRESS NOTES
[address], [facility] 22.    Breast Cancer Survivorship Care Plan    GENERAL INFORMATION    NAME/AGE/GENDER: Lisa Sellers is a 49 y.o. female who was born on 1970. PHONE: [phone]     Date: 8/7/2017    Referring Provider: Aurora Cheng RN, Oncology Nurse Navigator and Dr. Jenise Jordan    Patient Care Team:  Bethany Veloz MD as PCP - General (Internal Medicine) Sports Medicine and Primary Care [phone]  Killian Alves MD (Obstetrics & Gynecology) [phone]   Venus Boas., MD as Surgeon (Breast Surgery) [address] [facility] [phone]  Juan Pablo Judd DO as Physician (Oncology) Medical Oncology at [facility] [phone]  Jennifer Robbins NP as Nurse Practitioner (Cancer Survivorship) Medical Oncology [phone]  Deepa Jones MD as Surgeon (Plastic Surgery) [address] [facility] [phone]  Jim Gama MD as Physician (Radiation Oncology) [facility] Hiwot Locke at [address] [facility] [phone]    DIAGNOSIS    Cancer type/location/histologic subtype/receptor status: LEFT breast, 3.0 cm, 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 Roselia Eng     Please continue to see your primary care provider for all general health care recommended for a woman your age, including cancer screening tests.     Possible late and long-term effects that someone with this type of cancer and treatment may experience:  cardiovascular effects, lymphedema, neuropathy (nervous system changes) and acute leukemia (rarely)    Schedule of Clinical Visits        Coordinating Provider  When/How often Contact information   Primary Care Provider As needed for non-cancer health care [phone]   Medical Oncologist Every 3 to 6 months for the first 3 years, every 6 to 12 months for years 4 and 5, and annually thereafter [phone]   Radiation Oncologist Rotate visits with medical oncologist [phone]   Surgeon Rotate visits with medical oncologist [phone] As he recommends in your particular situation [phone]   Plastic Surgeon As she recommends in your particular situation   [phone]     CANCER TREATMENT SUMMARY     Surgery: Date and procedure/location/findings: 10/13/16 LEFT lumpectomy with LEFT axillary sentinel lymph node biopsy and RIGHT breast reduction, margins clear, no lymphovascular invasion identified, 4 lymph nodes removed--all were negative for cancer    Radiation: Yes End date (year): 4/26/17   Body area treated/dosage: 5000 cGy to LEFT breast, 6000 cGy to LEFT breast tumor bed--includes boost of 1000 cGy    Systemic Therapy (chemotherapy, biologics, other): Yes: 11/29/16 to 1/31/17    Regimen Drug Name End Date (Year)   TAC (Q3w x 6) Doxorubicin (Adriamycin) 50mg/m2 2017    Cyclophosphamide (Cytoxan) 500mg/m2 2017    Docetaxel (Taxotere) 75mg/m2 2017     Total dose of Doxorubicin--200mg/m2  Docetaxel (Taxotere) dose-reduced to 60mg/m2 on Cycle #4 on 1/31/17 due to peripheral neuropathy; your chemotherapy was discontinued after 4 cycles due to ongoing issues with peripheral neuropathy    Persistent symptoms or side effects at completion of treatment: Yes (enter type(s)): neuropathy, leg tightness and stiffness    Clinical Trial: No     Date of other cancer and/or recurrence of primary cancer and subsequent treatment: none, according to documentation in [facility], your [facility] medical record    FAMILIAL CANCER RISK ASSESSMENT    Family history of cancer: none, according to documentation in [facility]    Genetic/hereditary risk factor(s) or predisposing conditions: none apparent   Genetic testing: No      CONTINUING TREATMENT    Need for Ongoing (Adjuvant) Treatment for Cancer: No, your breast cancer did not have estrogen or progesterone receptors present (it was ER/WV negative) and you would not have benefited from these treatments     DOING YOUR PART AS A CANCER SURVIVOR    Many survivors feel worried or anxious that the cancer will come back after treatment. While it often does not, its important to talk with your doctor about the possibility of the cancer returning. Most breast cancer recurrences are found by patients between visits. Tell your doctor if you notice any of the following symptoms, as they may be signs of a cancer recurrence:     · New lumps in the breast  · Bone pain  · Chest pain  · Abdominal pain  · Shortness of breath or difficulty breathing  · Persistent headaches  · Persistent coughing  · Rash on breast  · Nipple discharge (liquid coming from the nipple)    Or any other symptoms should be brought to the attention of your provider:   1. Anything that represents a brand new symptom   2. Anything that represents a persistent symptom   3. Anything you are worried about that might be related to the cancer coming back    Cancer survivors may experience issues with the areas listed below. If you have any concerns in these or other areas, please speak with your survivorship nurse practitioner or a member of your physician team to find out how to get help with them.     Emotional and mental health, fatigue, weight changes, stopping smoking, physical functioning, insurance, financial advice/assistance, school/work issues, parenting, memory or concentration loss, fertility, sexual functioning, or any other survivorship concerns            General Guidelines for Health and Cancer Prevention/Risk Reduction      · Work to achieve and maintain a healthy weight  · Engage in regular physical activity including:  Aerobic exercise at least 150 minutes per week                            Strength training exercise at least 2 days per week    · Eat a diet that is high in vegetables, fruits, whole grains, legumes (beans) and low in saturated fats (animal fats)    · Quit/do not start using tobacco  · Limit alcohol consumption to no more than 1 drink per day for women/no more than 2 drinks per day for men    If you have any questions or need additional information/support, please discuss these recommendations with your doctor or nurse practitioner.         RESOURCES FOR THE JOURNEY    Breast Cancer Specific:  Living Beyond Breast Cancer www.lbbc.org  Breast Cancer. org NotSimilar.no. [facility] http://ww5.rancho. [address] Yanni Mckenzie www.[facility] [place]. Mathew Cunha 173. org    General:  Liv[employer]rong www.livestrong. [facility] www.cancer. [address] www.cancer. org  American Society of Clinical Oncology http://[employer].Gabstr/. net/research-and-advocacy/asco-care-and-treatment-  recommendations-patients/follow-care-breast-cancer  [facility] www.X2TV. [facility] [employer] www.canceradvocacy. 3 Jeanette Glover www.nccn. org  [address] www. patientadvocate. org  Cancer and Careers www.cancerandcareers. [address] [place] [place] [address]  Cancer Survivorship www.Loopster. Gabstr/cancersurvivorship    Prepared By: Pankaj Nj [facility]  [address]  [phone] or [phone]  [email or web address]    Delivered on: 8/8/17    This [facility] is a cancer treatment summary and follow-up plan provided to you to keep with your healthcare records and to share with your healthcare providers.   This summary is a brief record of major aspects of your cancer treatment, not a detailed or comprehensive record of your care.

## 2017-08-07 NOTE — PATIENT INSTRUCTIONS
Breast Cancer: Care Instructions  Your Care Instructions  Breast cancer occurs when abnormal cells grow out of control in the breast. These cancer cells can spread within the breast, to nearby lymph nodes and other tissues, and to other parts of the body. Being treated for cancer can weaken your body, and you may feel very tired. Get the rest your body needs so you can feel better. Finding out that you have cancer is scary. You may feel many emotions and may need some help coping. Seek out family, friends, and counselors for support. You also can do things at home to make yourself feel better while you go through treatment. Call the [employer] (2-937.878.7436) or visit its website at [employer] for more information. Follow-up care is a key part of your treatment and safety. Be sure to make and go to all appointments, and call your doctor if you are having problems. It's also a good idea to know your test results and keep a list of the medicines you take. How can you care for yourself at home? · Take your medicines exactly as prescribed. Call your doctor if you think you are having a problem with your medicine. You may get medicine for nausea and vomiting if you have these side effects. · Follow your doctor's instructions to relieve pain. Pain from cancer and surgery can almost always be controlled. Use pain medicine when you first notice pain, before it becomes severe. · Eat healthy food. If you do not feel like eating, try to eat food that has protein and extra calories to keep up your strength and prevent weight loss. Drink liquid meal replacements for extra calories and protein. Try to eat your main meal early. · Get some physical activity every day, but do not get too tired. Keep doing the hobbies you enjoy as your energy allows. · Do not smoke. Smoking can make your cancer worse. If you need help quitting, talk to your doctor about stop-smoking programs and medicines.  These can increase your chances of quitting for good. · Take steps to control your stress and workload. Learn relaxation techniques. ¨ Share your feelings. Stress and tension affect our emotions. By expressing your feelings to others, you may be able to understand and cope with them. ¨ Consider joining a support group. Talking about a problem with your spouse, a good friend, or other people with similar problems is a good way to reduce tension and stress. ¨ Express yourself through art. Try writing, crafts, dance, or art to relieve stress. Some dance, writing, or art groups may be available just for people who have cancer. ¨ Be kind to your body and mind. Getting enough sleep, eating a healthy diet, and taking time to do things you enjoy can contribute to an overall feeling of balance in your life and can help reduce stress. ¨ Get help if you need it. Discuss your concerns with your doctor or counselor. · If you are vomiting or have diarrhea:  ¨ Drink plenty of fluids (enough so that your urine is light yellow or clear like water) to prevent dehydration. Choose water and other caffeine-free clear liquids. If you have kidney, heart, or liver disease and have to limit fluids, talk with your doctor before you increase the amount of fluids you drink. ¨ When you are able to eat, try clear soups, mild foods, and liquids until all symptoms are gone for 12 to 48 hours. Other good choices include dry toast, crackers, cooked cereal, and gelatin dessert, such as Jell-O.  · If you have not already done so, prepare a list of advance directives. Advance directives are instructions to your doctor and family members about what kind of care you want if you become unable to speak or express yourself. When should you call for help? Call your doctor now or seek immediate medical care if:  · You have a fever. · Any part of your breast becomes red, tender, swollen, or hot.   · You have pain, redness, or swelling in the arm on the same side as your breast cancer. Watch closely for changes in your health, and be sure to contact your doctor if:  · You have pain that is not controlled by medicine. · You have nausea or vomiting. · You are constipated or have diarrhea. Where can you learn more? Go to http://rex-roro.info/. Enter V321 in the search box to learn more about \"Breast Cancer: Care Instructions. \"  Current as of: July 26, 2016  Content Version: 11.3  © 4888-1170 [employer]. Care instructions adapted under license by [employer] (which disclaims liability or warranty for this information). If you have questions about a medical condition or this instruction, always ask your healthcare professional. [address] any warranty or liability for your use of this information. Breast Cancer: Care Instructions  Your Care Instructions  Breast cancer occurs when abnormal cells grow out of control in the breast. These cancer cells can spread within the breast, to nearby lymph nodes and other tissues, and to other parts of the body. Being treated for cancer can weaken your body, and you may feel very tired. Get the rest your body needs so you can feel better. Finding out that you have cancer is scary. You may feel many emotions and may need some help coping. Seek out family, friends, and counselors for support. You also can do things at home to make yourself feel better while you go through treatment. Call the Felicia Johnson (7-987.534.2845) or visit its website at [address]. HAUL for more information. Follow-up care is a key part of your treatment and safety. Be sure to make and go to all appointments, and call your doctor if you are having problems. It's also a good idea to know your test results and keep a list of the medicines you take. How can you care for yourself at home? · Take your medicines exactly as prescribed.  Call your doctor if you think you are having a problem with your medicine. You may get medicine for nausea and vomiting if you have these side effects. · Follow your doctor's instructions to relieve pain. Pain from cancer and surgery can almost always be controlled. Use pain medicine when you first notice pain, before it becomes severe. · Eat healthy food. If you do not feel like eating, try to eat food that has protein and extra calories to keep up your strength and prevent weight loss. Drink liquid meal replacements for extra calories and protein. Try to eat your main meal early. · Get some physical activity every day, but do not get too tired. Keep doing the hobbies you enjoy as your energy allows. · Do not smoke. Smoking can make your cancer worse. If you need help quitting, talk to your doctor about stop-smoking programs and medicines. These can increase your chances of quitting for good. · Take steps to control your stress and workload. Learn relaxation techniques. ¨ Share your feelings. Stress and tension affect our emotions. By expressing your feelings to others, you may be able to understand and cope with them. ¨ Consider joining a support group. Talking about a problem with your spouse, a good friend, or other people with similar problems is a good way to reduce tension and stress. ¨ Express yourself through art. Try writing, crafts, dance, or art to relieve stress. Some dance, writing, or art groups may be available just for people who have cancer. ¨ Be kind to your body and mind. Getting enough sleep, eating a healthy diet, and taking time to do things you enjoy can contribute to an overall feeling of balance in your life and can help reduce stress. ¨ Get help if you need it. Discuss your concerns with your doctor or counselor. · If you are vomiting or have diarrhea:  ¨ Drink plenty of fluids (enough so that your urine is light yellow or clear like water) to prevent dehydration. Choose water and other caffeine-free clear liquids. If you have kidney, heart, or liver disease and have to limit fluids, talk with your doctor before you increase the amount of fluids you drink. ¨ When you are able to eat, try clear soups, mild foods, and liquids until all symptoms are gone for 12 to 48 hours. Other good choices include dry toast, crackers, cooked cereal, and gelatin dessert, such as Jell-O.  · If you have not already done so, prepare a list of advance directives. Advance directives are instructions to your doctor and family members about what kind of care you want if you become unable to speak or express yourself. When should you call for help? Call your doctor now or seek immediate medical care if:  · You have a fever. · Any part of your breast becomes red, tender, swollen, or hot. · You have pain, redness, or swelling in the arm on the same side as your breast cancer. Watch closely for changes in your health, and be sure to contact your doctor if:  · You have pain that is not controlled by medicine. · You have nausea or vomiting. · You are constipated or have diarrhea. Where can you learn more? Go to http://rex-roro.info/. Enter V321 in the search box to learn more about \"Breast Cancer: Care Instructions. \"  Current as of: July 26, 2016  Content Version: 11.3  © 3192-5512 [employer]. Care instructions adapted under license by "[employer]" (which disclaims liability or warranty for this information). If you have questions about a medical condition or this instruction, always ask your healthcare professional. [place] [address] any warranty or liability for your use of this information.

## 2017-08-07 NOTE — PROGRESS NOTES
HISTORY OF PRESENT ILLNESS  Luis Elizabeth is a 49 y.o. female. HPI  ESTABLISHED patient here for removal of sutures from incision, s/p port-a-cath scar revision of the RIGHT chest. Last week the incision  and she came in to have sutures tightened and steri strips placed. Incision is dry and intact. No redness or swelling. Denies pain. 10/13/16: LT lumpectomy with SNBx and RT reduction mammoplasty. LT breast: 3 cm, gr 3 IDC. 0/4 LN involved. Triple negative. Clear margins. pT2 pN0(sn) pMx. RT breast: Benign.     01/31/17: s/p adjuvant chemo (TAC x4) with Dr. Fredy Dove, stopped due to peripheral neuropathy.     03/2017: started XRT (6/30 treatments) at [facility].   [facility]    Physical Exam    ASSESSMENT and PLAN  {ASSESSMENT/PLAN:16946}

## 2017-08-07 NOTE — TELEPHONE ENCOUNTER
I will also send a copy to her PCP, Dr. Dalton Rodriguez. Unable to reach her today, but left message describing what is included in the survivorship care plan and encouraging her to call me with any questions she has once she has reviewed the information or to set up a more formal opportunity to go over this and receive additional resources--in a clinic visit or by phone  Left my contact phone #s in message for her return call now or when she has received the packet.

## 2017-08-07 NOTE — COMMUNICATION BODY
HISTORY OF PRESENT ILLNESS  Golden Buerger is a 49 y.o. female. HPI  ESTABLISHED patient here for removal of sutures from incision, s/p port-a-cath scar revision of the RIGHT chest. Last week the incision  and she came in to have sutures tightened and steri strips placed. Incision is dry and intact. No redness or swelling. Denies pain.      10/13/16: LT lumpectomy with SNBx and RT reduction mammoplasty. LT breast: 3 cm, gr 3 IDC. 0/4 LN involved. Triple negative. Clear margins. pT2 pN0(sn) pMx. RT breast: Benign.      01/31/17: s/p adjuvant chemo (TAC x4) with Dr. Phillip Hancock, stopped due to peripheral neuropathy.      03/2017: started XRT (6/30 treatments) at [facility]. Past Medical History:   Diagnosis Date    Arthritis     Asthma     Cancer ([facility] [address]     BREAST left    Chronic pain     lower extremities    Diabetes ([facility] [address] 2012    Dyslipidemia     IBS (irritable bowel syndrome)     Nausea & vomiting     Noncompliance     Obesity     Psychiatric disorder     S/P breast biopsy, left 8/1/16    Sinusitis     Stress at home        Past Surgical History:   Procedure Laterality Date    HX BREAST LUMPECTOMY Left 10/13/2016    LEFT BREAST REDUCTION LUMPECTOMY, LEFT SENTINEL NODE BIOPSY, LEFT BRACKETED NEEDLE LOCALIZATION, LEFT BREAST RECONSTRUCTION performed by Lisa Nunez MD at [address] [place] [address] Bilateral 10/13/2016    BREAST REDUCTION performed by Jimmy Lock MD at [place]  [phone]    X2    HX GYN  2002    ABLATION    HX HERNIA REPAIR       umbilical as infant    HX HYSTERECTOMY  2008    rosangela bso    HX ORTHOPAEDIC Left 2006    left dequervains    HX VASCULAR ACCESS Right 11/2016,4/2017    insertiona and removal of power port       Social History     Social History    Marital status: SINGLE     Spouse name: N/A    Number of children: N/A    Years of education: N/A     Occupational History    Not on file. Social History Main Topics    Smoking status: Never Smoker    Smokeless tobacco: Never Used    Alcohol use No    Drug use: No    Sexual activity: Not on file     Other Topics Concern    Not on file     Social History Narrative    ** Merged History Encounter **            Current Outpatient Prescriptions on File Prior to Visit   Medication Sig Dispense Refill    gabapentin (NEURONTIN) 300 mg capsule TK 1 C PO Q NIGHT FOR NEUROPATHIC PAIN  0    dicyclomine (BENTYL) 10 mg capsule Take 20 mg by mouth daily as needed.  naproxen (NAPROSYN) 500 mg tablet TAKE 1 TABLET BY MOUTH TWICE DAILY WITH MEALS 60 Tab 6    buPROPion SR (WELLBUTRIN SR) 150 mg SR tablet TAKE 1 TABLET BY MOUTH TWICE DAILY 30 Tab 11    cetirizine (ZYRTEC) 10 mg tablet Take 1 Tab by mouth daily. 60 Tab 1    LIRAGLUTIDE (VICTOZA 2-HUGO SC) 1.8 mg by SubCUTAneous route daily.  ONE TOUCH DELICA 33 gauge misc TEST THREE TIMES DAILY 100 Lancet 11    ONETOUCH ULTRA TEST strip TEST 3 TIMES A  Strip 11    albuterol (PROVENTIL VENTOLIN) 2.5 mg /3 mL (0.083 %) nebulizer solution every four (4) hours as needed.  NOVOLOG MIX 70-30 FLEXPEN 100 unit/mL (70-30) inpn USE 15 UNITS BEFORE BREAKFAST AND DINNER 15 mL 11    KELLY PEN NEEDLE 32 gauge x 5/32\" ndle USE AS DIRECTED TWICE DAILY 100 Pen Needle 11    albuterol (PROVENTIL HFA, VENTOLIN HFA, PROAIR HFA) 90 mcg/actuation inhaler Take 2 Puffs by inhalation every four (4) hours as needed for Wheezing. 1 Inhaler 11    HYDROcodone-acetaminophen (NORCO) 7.5-325 mg per tablet Take 1 Tab by mouth every four (4) hours as needed for Pain. Max Daily Amount: 6 Tabs. 20 Tab 0     No current facility-administered medications on file prior to visit. Allergies   Allergen Reactions    Codeine Diarrhea    Pravastatin Myalgia     Terrible leg aching.  Theophylline Diarrhea       OB History     Obstetric Comments    Menarche: 7. LMP: 2006. # of Children: 2.   Age at Delivery of First Child: 22.   Hysterectomy/oophorectomy: yes/yes  Breast Bx: no.  Hx of Breast Feeding: no  BCP: yes [id number]. Hormone therapy: yes 2008-present. ROS  Constitutional: Negative    HENT: Negative. Eyes: Negative. Respiratory: Negative. Cardiovascular: Negative. Gastrointestinal: Negative. Genitourinary: Negative. Musculoskeletal: Negative. Skin: Negative. Neurological: Negative. Endo/Heme/Allergies: Negative. Psychiatric/Behavioral: Negative. Physical Exam   Cardiovascular: Normal rate and normal heart sounds. Pulmonary/Chest: Breath sounds normal. Right breast exhibits no inverted nipple, no mass, no nipple discharge, no skin change and no tenderness. Left breast exhibits no inverted nipple, no mass, no nipple discharge, no skin change and no tenderness. Breasts are symmetrical.       Lymphadenopathy:        Right cervical: No superficial cervical, no deep cervical and no posterior cervical adenopathy present. Left cervical: No superficial cervical, no deep cervical and no posterior cervical adenopathy present. Right axillary: No pectoral and no lateral adenopathy present. Left axillary: No pectoral and no lateral adenopathy present. ASSESSMENT and PLAN    ICD-10-CM ICD-9-CM    1. Keloid scar L91.0 701.4    2. Malignant neoplasm of lower-inner quadrant of left female breast ([facility] [address] C50.312 174.3      Pt s/p excision of keloid scar and is here today for stitch removal. Removed stitch today without complications, which pt tolerated well. Discussed benign pathology. Will cancel postop appt on 9/11 and f/u in 6 months. This plan was reviewed with the patient and patient agrees. All questions were answered.     Written by Chano Perez, as dictated by Dr. Amira Keith MD.

## 2017-08-09 ENCOUNTER — TELEPHONE (OUTPATIENT)
Dept: PALLATIVE CARE | Age: 47
End: 2017-08-09

## 2017-08-09 NOTE — TELEPHONE ENCOUNTER
Patient calling to cancel her appt with Dr. Jacobo Alford on 8/11/17. Did not wish to reschedule at this time.

## 2017-08-10 DIAGNOSIS — J30.89 ENVIRONMENTAL AND SEASONAL ALLERGIES: ICD-10-CM

## 2017-08-10 RX ORDER — CETIRIZINE HCL 10 MG
TABLET ORAL
Qty: 60 TAB | Refills: 0 | Status: SHIPPED | OUTPATIENT
Start: 2017-08-10 | End: 2017-11-08 | Stop reason: ALTCHOICE

## 2017-08-12 ENCOUNTER — HOSPITAL ENCOUNTER (EMERGENCY)
Age: 47
Discharge: HOME | End: 2017-08-12
Attending: EMERGENCY MEDICINE
Payer: COMMERCIAL

## 2017-08-12 VITALS
BODY MASS INDEX: 46.44 KG/M2 | TEMPERATURE: 98.4 F | RESPIRATION RATE: 18 BRPM | HEIGHT: 61 IN | WEIGHT: 246 LBS | DIASTOLIC BLOOD PRESSURE: 72 MMHG | HEART RATE: 107 BPM | OXYGEN SATURATION: 97 % | SYSTOLIC BLOOD PRESSURE: 130 MMHG

## 2017-08-12 DIAGNOSIS — T81.30XA WOUND DEHISCENCE: Primary | ICD-10-CM

## 2017-08-12 PROCEDURE — 99282 EMERGENCY DEPT VISIT SF MDM: CPT

## 2017-08-12 NOTE — ED PROVIDER NOTES
HPI Comments: 49 y.o. female with past medical history significant for asthma, obesity, IBS, left breast CA, DM, and s/p left breast reduction and lumpectomy who presents from home via private vehicle for evaluation of wound. Pt reports that she had a port removed from her right upper chest 4 months ago. She recently had an incision revision at the site for keloids with the sutures removed 5 days ago. Pt reports that the wound dehisced last night but has not had any bleeding or drainage. No fever or any other complaint. There are no other acute medical concerns at this time. Social hx: Never smoker. No alcohol use. PCP: Erik Pollock MD  Surgery: Levy Cushing., MD    Note written by Khoa Velazquez, as dictated by Violetta Dubin, MD  11:10 AM     The history is provided by the patient. No  was used.         Past Medical History:   Diagnosis Date    Arthritis     Asthma     Cancer ([facility] [address]     BREAST left    Chronic pain     lower extremities    Diabetes ([facility] [address] 2012    Dyslipidemia     IBS (irritable bowel syndrome)     Nausea & vomiting     Noncompliance     Obesity     Psychiatric disorder     S/P breast biopsy, left 8/1/16    Sinusitis     Stress at home        Past Surgical History:   Procedure Laterality Date    HX BREAST LUMPECTOMY Left 10/13/2016    LEFT BREAST REDUCTION LUMPECTOMY, LEFT SENTINEL NODE BIOPSY, LEFT BRACKETED NEEDLE LOCALIZATION, LEFT BREAST RECONSTRUCTION performed by Petrona Adamson MD at [address] [place] [address] HX BREAST RECONSTRUCTION Bilateral 10/13/2016    BREAST REDUCTION performed by Kira Edward MD at [place]  [phone]    X2    HX GYN  2002    ABLATION    HX HERNIA REPAIR       umbilical as infant    HX HYSTERECTOMY  2008    rosangela bso    HX ORTHOPAEDIC Left 2006    left dequervains    HX VASCULAR ACCESS Right 11/2016,4/2017    insertiona and removal of power port Family History:   Problem Relation Age of Onset    Heart Disease Mother     Hypertension Mother     Heart Disease Father     Seizures Sister     Anesth Problems Neg Hx        Social History     Social History    Marital status: SINGLE     Spouse name: N/A    Number of children: N/A    Years of education: N/A     Occupational History    Not on file. Social History Main Topics    Smoking status: Never Smoker    Smokeless tobacco: Never Used    Alcohol use No    Drug use: No    Sexual activity: Not on file     Other Topics Concern    Not on file     Social History Narrative    ** Merged History Encounter **              ALLERGIES: Codeine; Pravastatin; and Theophylline    Review of Systems   Constitutional: Negative for chills and fever. HENT: Negative for rhinorrhea and sore throat. Respiratory: Negative for cough and shortness of breath. Cardiovascular: Negative for chest pain. Gastrointestinal: Negative for abdominal pain, diarrhea, nausea and vomiting. Genitourinary: Negative for dysuria and urgency. Musculoskeletal: Negative for arthralgias and back pain. Skin: Positive for wound. Negative for rash. Neurological: Negative for dizziness, weakness and light-headedness.        Vitals:    08/12/17 1043   BP: 130/72   Pulse: (!) 107   Resp: 18   Temp: 98.4 °F (36.9 °C)   SpO2: 97%   Weight: 111.6 kg (246 lb)   Height: 5' 1\" (1.549 m)            Physical Exam     Const:  No acute distress, well developed, well nourished  Head:  Atraumatic, normocephalic  Eyes:  PERRL, conjunctiva normal, no scleral icterus  Neck:  Supple, trachea midline  Cardiovascular:  RRR, no murmurs, no gallops, no rubs  Resp:  No resp distress, no increased work of breathing, no wheezes, no rhonchi, no rales,  Abd:  Soft, non-tender, non-distended, no rebound, no guarding, no CVA tenderness  :  Deferred  MSK:  No pedal edema, normal ROM  Neuro:  Alert and oriented x3, no cranial nerve defect  Skin:  Warm, dry. 2.5 cm dehiscence of half of scar over old port site on left upper anterior chest wall that is very superficial and does not track deep. No active bleeding, fluctuance, or surrounding erythema. Psych: normal mood and affect, behavior is normal, judgement and thought content is normal    Note written by Cynthea Goldberg, Scribe, as dictated by Arabella Ruiz MD 11:12 AM       MDM  Number of Diagnoses or Management Options  Wound dehiscence:      Amount and/or Complexity of Data Reviewed  Clinical lab tests: ordered and reviewed  Tests in the radiology section of CPT®: ordered and reviewed  Review and summarize past medical records: yes    Patient Progress  Patient progress: stable    ED Course     Pt. Presents to the ER with wound dehiscence. The dehiscence is superficial. No signs of infection. It was dressed and covered. Pt. To f/u with her surgeon or return to the ER with worsening sx.       Procedures

## 2017-08-12 NOTE — DISCHARGE INSTRUCTIONS
We hope that we have addressed all of your medical concerns. The examination and treatment you received in the Emergency Department were for an emergent problem and were not intended as complete care. It is important that you follow up with your healthcare provider(s) for ongoing care. If your symptoms worsen or do not improve as expected, and you are unable to reach your usual health care provider(s), you should return to the Emergency Department. Today's healthcare is undergoing tremendous change, and patient satisfaction surveys are one of the many tools to assess the quality of medical care. You may receive a survey from the [employer] regarding your experience in the Emergency Department. I hope that your experience has been completely positive, particularly the medical care that I provided. As such, please participate in the survey; anything less than excellent does not meet my expectations or intentions. [address] [facility] and [address] [place] [address] participate in nationally recognized quality of care measures. If your blood pressure is greater than 120/80, as reported below, we urge that you seek medical care to address the potential of high blood pressure, commonly known as hypertension. Hypertension can be hereditary or can be caused by certain medical conditions, pain, stress, or \"white coat syndrome. \"       Please make an appointment with your health care provider(s) for follow up of your Emergency Department visit. VITALS:   Patient Vitals for the past 8 hrs:   Temp Pulse Resp BP SpO2   08/12/17 1043 98.4 °F (36.9 °C) (!) 107 18 130/72 97 %          Thank you for allowing us to provide you with medical care today. We realize that you have many choices for your emergency care needs. Please choose us in the future for any continued health care needs. Murali Caban, [address] [facility] [address] Office: [phone]

## 2017-08-12 NOTE — ED NOTES
Pt given discharge instructions by provider. Opportunity given to ask questions. Pt ambulated to waiting area.

## 2017-10-09 DIAGNOSIS — J30.89 ENVIRONMENTAL AND SEASONAL ALLERGIES: ICD-10-CM

## 2017-10-09 RX ORDER — CETIRIZINE HCL 10 MG
TABLET ORAL
Qty: 60 TAB | Refills: 0 | OUTPATIENT
Start: 2017-10-09

## 2017-10-10 NOTE — TELEPHONE ENCOUNTER
Refill request form for Cetirizine faxed complete to [facility] with denial & rationale note per provider.

## 2017-10-17 ENCOUNTER — OFFICE VISIT (OUTPATIENT)
Dept: ONCOLOGY | Age: 47
End: 2017-10-17

## 2017-10-17 VITALS
HEIGHT: 61 IN | RESPIRATION RATE: 16 BRPM | BODY MASS INDEX: 47.2 KG/M2 | TEMPERATURE: 99 F | DIASTOLIC BLOOD PRESSURE: 78 MMHG | SYSTOLIC BLOOD PRESSURE: 138 MMHG | WEIGHT: 250 LBS | HEART RATE: 80 BPM | OXYGEN SATURATION: 98 %

## 2017-10-17 DIAGNOSIS — C50.912 STAGE 2 CARCINOMA OF BREAST, ER-, LEFT (HCC): Primary | ICD-10-CM

## 2017-10-17 DIAGNOSIS — T45.1X5A CHEMOTHERAPY-INDUCED NEUROPATHY (HCC): ICD-10-CM

## 2017-10-17 DIAGNOSIS — G62.0 CHEMOTHERAPY-INDUCED NEUROPATHY (HCC): ICD-10-CM

## 2017-10-17 DIAGNOSIS — Z98.890 S/P LUMPECTOMY, LEFT BREAST: ICD-10-CM

## 2017-10-17 DIAGNOSIS — Z17.1 STAGE 2 CARCINOMA OF BREAST, ER-, LEFT (HCC): Primary | ICD-10-CM

## 2017-10-17 NOTE — MR AVS SNAPSHOT
Visit Information Date & Time Provider Department Dept. Phone Encounter #  
 10/17/2017  8:45 AM Maria Carmona NP [facility] On[facility] at [address] [facility] [phone] [id number] Follow-up Instructions Return in about 3 months (around 1/17/2018). Your Appointments 2/12/2018  8:30 AM  
Follow Up with Edinson Samuels MD  
[facility]) Appt Note: 6 month follow up/cc reports/cp?/St  
 [place] [address]  
  
   
 [place] [address] [place] [address] Upcoming Health Maintenance Date Due Pneumococcal 19-64 Highest Risk (1 of 3 - PCV13) 6/8/1988 DTaP/Tdap/Td series (1 - Tdap) 6/8/1990 EYE EXAM RETINAL OR DILATED Q1 6/18/2016 MICROALBUMIN Q1 7/14/2017 INFLUENZA AGE 9 TO ADULT 8/1/2017 PAP AKA CERVICAL CYTOLOGY 8/6/2017 HEMOGLOBIN A1C Q6M 9/24/2017 FOOT EXAM Q1 3/24/2018 LIPID PANEL Q1 3/24/2018 Allergies as of 10/17/2017  Review Complete On: 10/17/2017 By: Ad Bui LPN Severity Noted Reaction Type Reactions Codeine  02/21/2011    Diarrhea Pravastatin  08/26/2016    Myalgia Terrible leg aching. Theophylline  02/21/2011    Diarrhea Current Immunizations  Reviewed on 10/17/2017 Name Date Influenza Vaccine 9/28/2016 Reviewed by Ad Bui LPN on 22/86/0013 at  8:47 AM  
Vitals BP Pulse Temp Resp Height(growth percentile) Weight(growth percentile) 138/78 80 99 °F (37.2 °C) (Oral) 16 5' 1\" (1.549 m) 250 lb (113.4 kg) SpO2 BMI OB Status Smoking Status 98% 47.24 kg/m2 Hysterectomy Never Smoker Vitals History BMI and BSA Data Body Mass Index Body Surface Area  
 47.24 kg/m 2 2.21 m 2 Preferred Pharmacy Pharmacy Name Phone [facility] [address]Derek AShruthi [address] [place] [address] [address] [facility] [phone] Your Updated Medication List  
  
   
This list is accurate as of: 10/17/17  9:22 AM.  Always use your most recent med list.  
  
  
  
  
 * albuterol 90 mcg/actuation inhaler Commonly known as:  PROVENTIL HFA, VENTOLIN HFA, PROAIR HFA Take 2 Puffs by inhalation every four (4) hours as needed for Wheezing. * albuterol 2.5 mg /3 mL (0.083 %) nebulizer solution Commonly known as:  PROVENTIL VENTOLIN  
every four (4) hours as needed. BENTYL 10 mg capsule Generic drug:  dicyclomine Take 20 mg by mouth daily as needed. buPROPion  mg SR tablet Commonly known as:  WELLBUTRIN SR  
TAKE 1 TABLET BY MOUTH TWICE DAILY  
  
 cetirizine 10 mg tablet Commonly known as:  ZYRTEC  
TAKE 1 TABLET BY MOUTH DAILY  
  
 gabapentin 300 mg capsule Commonly known as:  NEURONTIN  
TK 1 C PO Q NIGHT FOR NEUROPATHIC PAIN  
  
 HYDROcodone-acetaminophen 7.5-325 mg per tablet Commonly known as:  March [place] Take 1 Tab by mouth every four (4) hours as needed for Pain. Max Daily Amount: 6 Tabs. Mary Pen Needle 32 gauge x 5/32\" Ndle Generic drug:  Insulin Needles (Disposable) USE AS DIRECTED TWICE DAILY  
  
 naproxen 500 mg tablet Commonly known as:  NAPROSYN  
TAKE 1 TABLET BY MOUTH TWICE DAILY WITH MEALS NovoLOG Mix 70-30 FlexPen 100 unit/mL (70-30) Inpn Generic drug:  insulin aspart protamine/insulin aspart USE 15 UNITS BEFORE BREAKFAST AND DINNER One Touch Delica 33 gauge Misc Generic drug:  lancets TEST THREE TIMES DAILY  
  
 ONETOUCH ULTRA TEST strip Generic drug:  glucose blood VI test strips TEST 3 TIMES A DAY  
  
 VICTOZA 2-HUGO SC  
1.8 mg by SubCUTAneous route daily. * Notice: This list has 2 medication(s) that are the same as other medications prescribed for you. Read the directions carefully, and ask your doctor or other care provider to review them with you. Follow-up Instructions Return in about 3 months (around 1/17/2018). Introducing [facility]! Dear Luis Bynum: 
Thank you for requesting a [employer] account. Our records indicate that you already have an active [employer] account. You can access your account anytime at [email or web address] Did you know that you can access your hospital and ER discharge instructions at any time in [employer]? You can also review all of your test results from your hospital stay or ER visit. Additional Information If you have questions, please visit the Frequently Asked Questions section of the [employer] website at [email or web address] Remember, [employer] is NOT to be used for urgent needs. For medical emergencies, dial 911. Now available from your iPhone and Android! Please provide this summary of care documentation to your next provider. Your primary care clinician is listed as Teofilo Collet. If you have any questions after today's visit, please call [phone].

## 2017-10-17 NOTE — PROGRESS NOTES
HEME/ONC PROGRESS NOTE       Raul Chaves is a 49 y.o. 1970 female and presents with Breast Cancer    CC  Triple neg left breast cancer 8/16    HPI: Ms. Mary Adams is here for follow-up today for triple negative breast cancer, not on any therapy. She reports she is feeling well overall. She reports she continues to have occasional shooting pains to the left breast which are not new and are unchanged. She reports she had a headache twice last week but thinks it is related to needing new glasses. She denies shortness of breath or cough. She reports neuropathy has been better. She denies nausea, vomiting, or diarrhea. Otherwise, complete ROS is per the symptom report form which has been scanned into the media section of the electronic medical record. DX   Encounter Diagnoses   Name Primary?  Stage 2 carcinoma of breast, ER-, left ([facility] Yes    S/P lumpectomy, left breast     Chemotherapy-induced neuropathy ([facility]         STAGE: T2N0 triple neg    TREATMENT COURSE: lumpectomy 10/16 with b/l reduction, Adjuvant chemotherapy with TAC x 4, stopped due to peripheral neuropathy  Radiation 3/17 at [facility].      Past Medical History:   Diagnosis Date    Arthritis     Asthma     Cancer ([address]     BREAST left    Chronic pain     lower extremities    Diabetes ([address] 2012    Dyslipidemia     IBS (irritable bowel syndrome)     Nausea & vomiting     Noncompliance     Obesity     Psychiatric disorder     S/P breast biopsy, left 8/1/16    Sinusitis     Stress at home      Past Surgical History:   Procedure Laterality Date    HX BREAST LUMPECTOMY Left 10/13/2016    LEFT BREAST REDUCTION LUMPECTOMY, LEFT SENTINEL NODE BIOPSY, LEFT BRACKETED NEEDLE LOCALIZATION, LEFT BREAST RECONSTRUCTION performed by Ramona Diamond MD at [address] [place] [address] HX BREAST RECONSTRUCTION Bilateral 10/13/2016    BREAST REDUCTION performed by Cisco Brown MD at [facility] [phone]    X2    HX GYN  2002    ABLATION    HX HERNIA REPAIR       umbilical as infant    HX HYSTERECTOMY  2008    rosangela bso    HX ORTHOPAEDIC Left 2006    left dequervains    HX VASCULAR ACCESS Right 11/2016,4/2017    insertiona and removal of power port     Social History     Social History    Marital status: SINGLE     Spouse name: N/A    Number of children: N/A    Years of education: N/A     Social History Main Topics    Smoking status: Never Smoker    Smokeless tobacco: Never Used    Alcohol use No    Drug use: No    Sexual activity: Not Asked     Other Topics Concern    None     Social History Narrative    ** Merged History Encounter **          Family History   Problem Relation Age of Onset    Heart Disease Mother     Hypertension Mother     Heart Disease Father     Seizures Sister     Anesth Problems Neg Hx        Current Outpatient Prescriptions   Medication Sig Dispense Refill    dicyclomine (BENTYL) 10 mg capsule Take 20 mg by mouth daily as needed.  buPROPion SR (WELLBUTRIN SR) 150 mg SR tablet TAKE 1 TABLET BY MOUTH TWICE DAILY 30 Tab 11    LIRAGLUTIDE (VICTOZA 2-HUGO SC) 1.8 mg by SubCUTAneous route daily.  ONE TOUCH DELICA 33 gauge misc TEST THREE TIMES DAILY 100 Lancet 11    ONETOUCH ULTRA TEST strip TEST 3 TIMES A  Strip 11    NOVOLOG MIX 70-30 FLEXPEN 100 unit/mL (70-30) inpn USE 15 UNITS BEFORE BREAKFAST AND DINNER 15 mL 11    KELLY PEN NEEDLE 32 gauge x 5/32\" ndle USE AS DIRECTED TWICE DAILY 100 Pen Needle 11    cetirizine (ZYRTEC) 10 mg tablet TAKE 1 TABLET BY MOUTH DAILY 60 Tab 0    gabapentin (NEURONTIN) 300 mg capsule TK 1 C PO Q NIGHT FOR NEUROPATHIC PAIN  0    HYDROcodone-acetaminophen (NORCO) 7.5-325 mg per tablet Take 1 Tab by mouth every four (4) hours as needed for Pain. Max Daily Amount: 6 Tabs.  20 Tab 0    naproxen (NAPROSYN) 500 mg tablet TAKE 1 TABLET BY MOUTH TWICE DAILY WITH MEALS 60 Tab 6    albuterol (PROVENTIL VENTOLIN) 2.5 mg /3 mL (0.083 %) nebulizer solution every four (4) hours as needed.  albuterol (PROVENTIL HFA, VENTOLIN HFA, PROAIR HFA) 90 mcg/actuation inhaler Take 2 Puffs by inhalation every four (4) hours as needed for Wheezing. 1 Inhaler 11       Allergies   Allergen Reactions    Codeine Diarrhea    Pravastatin Myalgia     Terrible leg aching.  Theophylline Diarrhea       Review of Systems    A comprehensive review of systems was negative except for: per HPI     ECOG PS 0. Objective:  Visit Vitals    /78    Pulse 80    Temp 99 °F (37.2 °C) (Oral)    Resp 16    Ht 5' 1\" (1.549 m)    Wt 250 lb (113.4 kg)    SpO2 98%    BMI 47.24 kg/m2       Physical Exam:   General appearance - alert, well appearing, and in no distress, oriented to person, place, and time and overweight  Mental status - alert, oriented to person, place, and time, normal mood, behavior, speech, dress, motor activity, and thought processes  EYE-conj clear  Mouth - mucous membranes pink, moist, intact. No lesions or thrush. Neck - supple  Chest - clear to auscultation bilaterally  Heart - normal rate and regular rhythm   Abdomen - round, soft, nontender  Ext-no pedal edema noted bilaterally  Skin-Warm and dry. Intact without rash. Neuro -alert, oriented, normal speech, no focal findings or movement disorder noted  Breast - No palpable masses bilaterally. Left lumpectomy scar with fullness noted, unchanged per patient report. No axillary adenopathy bilaterally.     Diagnostic Imaging   reviewed  [place] [facility] Results (most recent):    Results from Abstract encounter on 07/25/17   [place] [facility] MAMMO RT DX INCL CAD  Lab Results  reviewed  Lab Results   Component Value Date/Time    WBC 7.0 07/27/2017 09:55 AM    HGB 12.1 07/27/2017 09:55 AM    HCT 36.4 07/27/2017 09:55 AM    PLATELET 023 06/90/5179 09:55 AM    MCV 86.3 07/27/2017 09:55 AM       Lab Results   Component Value Date/Time    Sodium 142 07/27/2017 09:55 AM    Potassium 4.5 07/27/2017 09:55 AM Chloride 107 07/27/2017 09:55 AM    CO2 30 07/27/2017 09:55 AM    Anion gap 5 07/27/2017 09:55 AM    Glucose 228 07/27/2017 09:55 AM    BUN 9 07/27/2017 09:55 AM    Creatinine 0.57 07/27/2017 09:55 AM    BUN/Creatinine ratio 16 07/27/2017 09:55 AM    GFR est AA >60 07/27/2017 09:55 AM    GFR est non-AA >60 07/27/2017 09:55 AM    Calcium 8.8 07/27/2017 09:55 AM    AST (SGOT) 15 03/24/2017 12:28 PM    Alk. phosphatase 72 03/24/2017 12:28 PM    Protein, total 6.7 03/24/2017 12:28 PM    Albumin 4.2 03/24/2017 12:28 PM    Globulin 2.8 02/21/2017 08:24 AM    A-G Ratio 1.7 03/24/2017 12:28 PM    ALT (SGPT) 12 03/24/2017 12:28 PM       Assessment/Plan:     Sherman Bennett is a  49 y.o. female and presents with Breast Cancer    CC  Triple neg left breast cancer 8/16    HPI  Pt seen today for office fu for triple neg left breast cancer. DX   Encounter Diagnoses   Name Primary?  Triple negative malignant neoplasm of breast ([facility] Yes    S/P lumpectomy, left breast     Stage 2 carcinoma of breast, ER-, left ([address]     Morbid obesity due to excess calories ([address]     Type 2 diabetes mellitus without complication, unspecified long term insulin use status ([address]     Irritable bowel syndrome without diarrhea     Essential hypertension       STAGE: T2N0 triple neg    TREATMENT COURSE: lumpectomy 101/6    1. Stage 2 triple neg breast cancer s/p lumpectomy/ b/l breast reduction/. She completed chemotherapy with TAC x 4. Stopped due to side effects. She has had no evidence of recurrent disease. Pt clinically doing well. She had radiation at [facility]. Last bilateral mammogram in 2/2017. Right breast mammogram completed in June 2017. She is due for bilateral mammogram again in February. Last labs July 2017 were stable. 2. HTN/ asthma/ DM. Per PCP. 3. Peripheral neuropathy. Improving. Follow-up in 3 months. Seen in conjunction with Werner Mccormack NP.       ICD-10-CM ICD-9-CM    1. Stage 2 carcinoma of breast, ER-, left (HCC) C50.912 174.9     Z17.1 V86.1    2. S/P lumpectomy, left breast Z98.890 V45.89    3. Chemotherapy-induced neuropathy (HCC) G62.0 357.6     T45.1X5A E933.1        Current Outpatient Prescriptions   Medication Sig    dicyclomine (BENTYL) 10 mg capsule Take 20 mg by mouth daily as needed.  buPROPion SR (WELLBUTRIN SR) 150 mg SR tablet TAKE 1 TABLET BY MOUTH TWICE DAILY    LIRAGLUTIDE (VICTOZA 2-HUGO SC) 1.8 mg by SubCUTAneous route daily.  ONE TOUCH DELICA 33 gauge misc TEST THREE TIMES DAILY    ONETOUCH ULTRA TEST strip TEST 3 TIMES A DAY    NOVOLOG MIX 70-30 FLEXPEN 100 unit/mL (70-30) inpn USE 15 UNITS BEFORE BREAKFAST AND DINNER    KELLY PEN NEEDLE 32 gauge x 5/32\" ndle USE AS DIRECTED TWICE DAILY    cetirizine (ZYRTEC) 10 mg tablet TAKE 1 TABLET BY MOUTH DAILY    gabapentin (NEURONTIN) 300 mg capsule TK 1 C PO Q NIGHT FOR NEUROPATHIC PAIN    HYDROcodone-acetaminophen (NORCO) 7.5-325 mg per tablet Take 1 Tab by mouth every four (4) hours as needed for Pain. Max Daily Amount: 6 Tabs.  naproxen (NAPROSYN) 500 mg tablet TAKE 1 TABLET BY MOUTH TWICE DAILY WITH MEALS    albuterol (PROVENTIL VENTOLIN) 2.5 mg /3 mL (0.083 %) nebulizer solution every four (4) hours as needed.  albuterol (PROVENTIL HFA, VENTOLIN HFA, PROAIR HFA) 90 mcg/actuation inhaler Take 2 Puffs by inhalation every four (4) hours as needed for Wheezing. No current facility-administered medications for this visit. continue present plan, call if any problems  There are no Patient Instructions on file for this visit. Follow-up Disposition:  Return in about 3 months (around 1/17/2018).     Janell Francois NP

## 2017-11-08 ENCOUNTER — OFFICE VISIT (OUTPATIENT)
Dept: INTERNAL MEDICINE CLINIC | Age: 47
End: 2017-11-08

## 2017-11-08 VITALS
HEART RATE: 101 BPM | RESPIRATION RATE: 20 BRPM | SYSTOLIC BLOOD PRESSURE: 135 MMHG | TEMPERATURE: 98.2 F | WEIGHT: 246.4 LBS | HEIGHT: 61 IN | BODY MASS INDEX: 46.52 KG/M2 | DIASTOLIC BLOOD PRESSURE: 80 MMHG | OXYGEN SATURATION: 20 %

## 2017-11-08 DIAGNOSIS — Z17.1 CARCINOMA OF LEFT BREAST, STAGE 2, ESTROGEN RECEPTOR NEGATIVE (HCC): ICD-10-CM

## 2017-11-08 DIAGNOSIS — E11.9 TYPE 2 DIABETES MELLITUS WITHOUT COMPLICATION, UNSPECIFIED LONG TERM INSULIN USE STATUS: ICD-10-CM

## 2017-11-08 DIAGNOSIS — C50.912 CARCINOMA OF LEFT BREAST, STAGE 2, ESTROGEN RECEPTOR NEGATIVE (HCC): ICD-10-CM

## 2017-11-08 DIAGNOSIS — Z23 ENCOUNTER FOR IMMUNIZATION: Primary | ICD-10-CM

## 2017-11-08 DIAGNOSIS — K58.9 IRRITABLE BOWEL SYNDROME WITHOUT DIARRHEA: ICD-10-CM

## 2017-11-08 DIAGNOSIS — E78.5 DYSLIPIDEMIA: ICD-10-CM

## 2017-11-08 RX ORDER — INSULIN LISPRO 100 [IU]/ML
4 INJECTION, SOLUTION INTRAVENOUS; SUBCUTANEOUS
Qty: 1 PACKAGE | Refills: 11 | Status: SHIPPED | OUTPATIENT
Start: 2017-11-08 | End: 2018-11-12 | Stop reason: SDUPTHER

## 2017-11-08 RX ORDER — ATORVASTATIN CALCIUM 10 MG/1
10 TABLET, FILM COATED ORAL DAILY
Qty: 30 TAB | Refills: 11 | Status: SHIPPED | OUTPATIENT
Start: 2017-11-08 | End: 2018-05-30 | Stop reason: SINTOL

## 2017-11-08 RX ORDER — INSULIN GLARGINE 100 [IU]/ML
25 INJECTION, SOLUTION SUBCUTANEOUS
Qty: 5 ADJUSTABLE DOSE PRE-FILLED PEN SYRINGE | Refills: 11 | Status: SHIPPED | OUTPATIENT
Start: 2017-11-08 | End: 2019-01-07 | Stop reason: SDUPTHER

## 2017-11-08 NOTE — PROGRESS NOTES
SPORTS MEDICINE AND PRIMARY CARE  Sumit Graves MD, [address] [address]  Phone:  [phone]  Fax: [phone]       Chief Complaint   Patient presents with    Diabetes     f/u   . SUBJECTIVE:    Kirill Villa is a 49 y.o. female Patient returns today after excision of keloid scar, approximately 4 cm, with injection of 40 mg of Kenalog and primary closure for symptomatic keloid scar of the Port-A-Cath site on 06/40/64 by Sunday Guzmán M.D. . 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 She has a known history of IBS, , diabetes, and is seen for evaluation. She is currently under the care of Aleksandra Pope M.D., plastic surgeon, for the recurrence of the keloid over the Port-A-Cath. Patient complains of occipital headache. She recalls that she had an MRI of her head [place] [facility] and they mentioned degenerative changes in her cervical spine. Patient claims she has been doing pretty good, but then goes on to say that \"the diabetes has been out of control\". Current Outpatient Prescriptions   Medication Sig Dispense Refill    Liraglutide (VICTOZA) 0.6 mg/0.1 mL (18 mg/3 mL) pnij 1.8 mg by SubCUTAneous route daily. 2 Adjustable Dose Pre-filled Pen Syringe 11    insulin lispro (HUMALOG) 100 unit/mL kwikpen 4 Units by SubCUTAneous route Before breakfast, lunch, and dinner. Or as directed 1 Package 11    insulin glargine (LANTUS,BASAGLAR) 100 unit/mL (3 mL) inpn 25 Units by SubCUTAneous route nightly. 5 Adjustable Dose Pre-filled Pen Syringe 11    atorvastatin (LIPITOR) 10 mg tablet Take 1 Tab by mouth daily. 30 Tab 11    dicyclomine (BENTYL) 10 mg capsule Take 20 mg by mouth daily as needed.       naproxen (NAPROSYN) 500 mg tablet TAKE 1 TABLET BY MOUTH TWICE DAILY WITH MEALS 60 Tab 6    buPROPion SR (WELLBUTRIN SR) 150 mg SR tablet TAKE 1 TABLET BY MOUTH TWICE DAILY 30 Tab 11    ONE TOUCH DELICA 33 gauge misc TEST THREE TIMES DAILY 100 Lancet 11    ONETOUCH ULTRA TEST strip TEST 3 TIMES A  Strip 11    albuterol (PROVENTIL VENTOLIN) 2.5 mg /3 mL (0.083 %) nebulizer solution every four (4) hours as needed.  KELLY PEN NEEDLE 32 gauge x 5/32\" ndle USE AS DIRECTED TWICE DAILY 100 Pen Needle 11    albuterol (PROVENTIL HFA, VENTOLIN HFA, PROAIR HFA) 90 mcg/actuation inhaler Take 2 Puffs by inhalation every four (4) hours as needed for Wheezing.  1 Inhaler 11     Past Medical History:   Diagnosis Date    Arthritis     Asthma     Cancer of left breast ([facility] 08/01/2016    BREAST left lumpectomy 10/16 with b/l reduction, Adjuvant chemotherapy with TAC x 4,stopped due to peripheral neuropathy,Radiation 3/17 at [facility] Chronic pain     lower extremities    Diabetes ([facility] [facility] 2012    Dyslipidemia     IBS (irritable bowel syndrome)     Keloid 08/01/2017    Excision of keloid scar approximately 4 cm -Vicki Riley MD     Nausea & vomiting     Noncompliance     Obesity     Psychiatric disorder     S/P breast biopsy, left 8/1/16    Sinusitis     Stress at home      Past Surgical History:   Procedure Laterality Date    HX BREAST LUMPECTOMY Left 10/13/2016    LEFT BREAST REDUCTION LUMPECTOMY, LEFT SENTINEL NODE BIOPSY, LEFT BRACKETED NEEDLE LOCALIZATION, LEFT BREAST RECONSTRUCTION performed by Vicki Riley MD at [address] [place], [place] [address]y Bilateral 10/13/2016    BREAST REDUCTION performed by Jesus Johnson MD at [place]  [phone]    X2    HX GYN  2002    ABLATION    HX HERNIA REPAIR       umbilical as infant    HX HYSTERECTOMY  2008    rosangela bso    HX ORTHOPAEDIC Left 2006    left dequervains    HX VASCULAR ACCESS Right 11/2016,4/2017    insertiona and removal of power port     Allergies   Allergen Reactions    Codeine Diarrhea    Pravastatin Myalgia     Terrible leg aching.  Theophylline Diarrhea         REVIEW OF SYSTEMS:  General: negative for - chills or fever  ENT: negative for - headaches, nasal congestion or tinnitus  Respiratory: negative for - cough, hemoptysis, shortness of breath or wheezing  Cardiovascular : negative for - chest pain, edema, palpitations or shortness of breath  Gastrointestinal: negative for - abdominal pain, blood in stools, heartburn or nausea/vomiting  Genito-Urinary: no dysuria, trouble voiding, or hematuria  Musculoskeletal: negative for - gait disturbance, joint pain, joint stiffness or joint swelling  Neurological: no TIA or stroke symptoms  Hematologic: no bruises, no bleeding, no swollen glands  Integument: no lumps, mole changes, nail changes or rash  Endocrine: no malaise/lethargy or unexpected weight changes      Social History     Social History    Marital status: SINGLE     Spouse name: N/A    Number of children: N/A    Years of education: N/A     Social History Main Topics    Smoking status: Never Smoker    Smokeless tobacco: Never Used    Alcohol use No    Drug use: No    Sexual activity: Yes     Partners: Male     Birth control/ protection: None     Other Topics Concern    None     Social History Narrative         Medical History: Recurrent depressive d/o 2012Muscloskeletal back pain 2012Carpal tunnel syndrome 2012Asthma 1979Obesity 2012IBS (nima Carter) 2012diabetes mellitus type 2 2013    Gyn History: Last mammogram date 2013. Surgical History:  x2, RA 10/02    Hospitalization/Major Diagnostic Procedure: asthma childhood    Family History: Mother: alive 58 yrs CAD, HTN, depression Father: alive 61 yrs Brother(s): 40 yrs mental issues Son(s): alive 19,23 yrs 1:    bipolar and schizophrenia on SSI kelley [id number] 1 brother(s) . 2 son(s) .     Social History: Alcohol Use Patient does not use alcohol. Smoking Status Patient is a never smoker. Marital Status: Single. Lives w ith: alone    son. Occupation/W ork: employed full time sw [employer] at [employer] [place] Education/School: has highschool diploma, college. Je[employer]'s witness: [employer].     Family History   Problem Relation Age of Onset    Heart Disease Mother     Hypertension Mother     Heart Disease Father     Seizures Sister     Anesth Problems Neg Hx        OBJECTIVE:    Visit Vitals    /80    Pulse (!) 101    Temp 98.2 °F (36.8 °C) (Oral)    Resp 20    Ht 5' 1\" (1.549 m)    Wt 246 lb 6.4 oz (111.8 kg)    SpO2 (!) 20%    BMI 46.56 kg/m2     CONSTITUTIONAL: well , well nourished, appears age appropriate  EYES: perrla, eom intact  ENMT:moist mucous membranes, pharynx clear  NECK: supple. Thyroid normal  RESPIRATORY: Chest: clear bilaterally   CARDIOVASCULAR: Heart: regular rate and rhythm  GASTROINTESTINAL: Abdomen: soft, bowel sounds active  HEMATOLOGIC: no pathological lymph nodes palpated  MUSCULOSKELETAL: Extremities: no edema, pulse 1+   INTEGUMENT: No unusual rashes or suspicious skin lesions noted. Nails appear normal.  NEUROLOGIC: non-focal exam   MENTAL STATUS: alert and oriented, appropriate affect           ASSESSMENT:  1. Encounter for immunization    2. Type 2 diabetes mellitus without complication, unspecified long term insulin use status (HCC)    3. Carcinoma of left breast, stage 2, estrogen receptor negative ([facility] [address]    4. Irritable bowel syndrome without diarrhea    5. Dyslipidemia      Patient is having great hassles by the insurance company. Not only do they not want to pay for her insulin, they don't want to pay for a meter. She filled out the forms as they have requested and they have not responded. They don't want to pay for the needles for her insulin, syringes, etc.  We asked her to call the insurance company and request those items. We have also put a request in for the Victoza.   It is a preauthorized medication she has been taking right along and do not want to stop it. They will pay for Lantus and therefore will change her insulin to Lantus 25 units q.h.s., and Humalog 40 units a.c. meals. She will increase each insulin by 2 units pending the blood sugar results every three days. If the fasting blood sugars are greater than 120 she'll increase the Lantus at bedtime by 2 units. If the mealtime sugars are still running greater than 140 she'll increase them by 2 units every three days until her blood sugars are well controlled. Her BMI is reflective of a 4 pound weight loss and we congratulate her for the weight loss. We asked her to continue physical activity for 30 minutes five days a week and a heart healthy, weight reducing diet. Blood pressure control is at goal.    Appropriate laboratory studies will be requested. She'll return to the office in three months, but she'll be in contact with us regarding this insurance company and their mechanisms they have of trying to avoid for her immediate supplies. PLAN:  .  Orders Placed This Encounter    Influenza virus vaccine (QUADRIVALENT PRES FREE SYRINGE) IM ([id number])    MICROALBUMIN, UR, RAND W/ MICROALBUMIN/CREA RATIO    HEMOGLOBIN A1C WITH EAG    CK    Liraglutide (VICTOZA) 0.6 mg/0.1 mL (18 mg/3 mL) pnij    insulin lispro (HUMALOG) 100 unit/mL kwikpen    insulin glargine (LANTUS,BASAGLAR) 100 unit/mL (3 mL) inpn    atorvastatin (LIPITOR) 10 mg tablet       Follow-up Disposition:  Return in about 3 months (around 2/8/2018). ATTENTION:   This medical record was transcribed using an electronic medical records system. Although proofread, it may and can contain electronic and spelling errors. Other human spelling and other errors may be present. Corrections may be executed at a later time. Please feel free to contact us for any clarifications as needed.

## 2017-11-08 NOTE — PROGRESS NOTES
1. Have you been to the ER, urgent care clinic since your last visit? Hospitalized since your last visit? Yes When: 10-16-17 Where: [facility] Reason for visit: open incision    2. Have you seen or consulted any other health care providers outside of the [address] [place] [address] since your last visit? Include any pap smears or colon screening.  No

## 2017-11-08 NOTE — MR AVS SNAPSHOT
Visit Information Date & Time Provider Department Dept. Phone Encounter #  
 11/8/2017 11:30 AM Dana Lofton [facility] and [place] 34 [id number] Follow-up Instructions Return in about 3 months (around 2/8/2018). Follow-up and Disposition History Your Appointments 1/11/2018  9:00 AM  
Follow Up with DO Justino [facility]) Appt Note: follow up [address] [facility] Derek [address] [place] [address]  
[phone]  
  
   
 [address] Em[address] CALL [facility] [address]  
  
    
 2/7/2018  9:30 AM  
Any with Rhina Burns MD  
[address] [facility]) Appt Note: follow up 3mnths  
 [address] [facility]  
  
   
 [address] [id number]  
  
    
 2/12/2018  8:30 AM  
Follow Up with Nestor Bailey MD  
[facility]) Appt Note: 6 month follow up/cc reports/cp?/St  
 [address] [facility] Noe Adolfo Caldron [address]  
  
   
 [address] [facility] [address] [facility] Upcoming Health Maintenance Date Due Pneumococcal 19-64 Highest Risk (1 of 3 - PCV13) 6/8/1988 DTaP/Tdap/Td series (1 - Tdap) 6/8/1990 EYE EXAM RETINAL OR DILATED Q1 6/18/2016 MICROALBUMIN Q1 7/14/2017 Influenza Age 5 to Adult 8/1/2017 PAP AKA CERVICAL CYTOLOGY 8/6/2017 HEMOGLOBIN A1C Q6M 9/24/2017 FOOT EXAM Q1 3/24/2018 LIPID PANEL Q1 3/24/2018 Allergies as of 11/8/2017  Review Complete On: 11/8/2017 By: Rhina Burns MD  
  
 Severity Noted Reaction Type Reactions Codeine  02/21/2011    Diarrhea Pravastatin  08/26/2016    Myalgia Terrible leg aching. Theophylline  02/21/2011    Diarrhea Current Immunizations  Reviewed on 10/17/2017 Name Date Influenza Vaccine 9/28/2016 Influenza Vaccine (Quad) PF 11/8/2017 Not reviewed this visit You Were Diagnosed With   
  
 Codes Comments Encounter for immunization    -  Primary ICD-10-CM: S23 ICD-9-CM: V03.89 Type 2 diabetes mellitus without complication, unspecified long term insulin use status (HCC)     ICD-10-CM: E11.9 ICD-9-CM: 250.00 Carcinoma of left breast, stage 2, estrogen receptor negative [facility]     ICD-10-CM: C50.912, Z17.1 ICD-9-CM: 174.9, V86.1 Irritable bowel syndrome without diarrhea     ICD-10-CM: K58.9 ICD-9-CM: 040.3 Dyslipidemia     ICD-10-CM: E78.5 ICD-9-CM: 272.4 Vitals BP Pulse Temp Resp Height(growth percentile) Weight(growth percentile) 135/80 (!) 101 98.2 °F (36.8 °C) (Oral) 20 5' 1\" (1.549 m) 246 lb 6.4 oz (111.8 kg) SpO2 BMI OB Status Smoking Status (!) 20% 46.56 kg/m2 Hysterectomy Never Smoker BMI and BSA Data Body Mass Index Body Surface Area  
 46.56 kg/m 2 2.19 m 2 Preferred Pharmacy Pharmacy Name Phone [facility] [address] [place] [address] [phone] Your Updated Medication List  
  
   
This list is accurate as of: 11/8/17 12:50 PM.  Always use your most recent med list.  
  
  
  
  
 * albuterol 90 mcg/actuation inhaler Commonly known as:  PROVENTIL HFA, VENTOLIN HFA, PROAIR HFA Take 2 Puffs by inhalation every four (4) hours as needed for Wheezing. * albuterol 2.5 mg /3 mL (0.083 %) nebulizer solution Commonly known as:  PROVENTIL VENTOLIN  
every four (4) hours as needed. atorvastatin 10 mg tablet Commonly known as:  LIPITOR Take 1 Tab by mouth daily. BENTYL 10 mg capsule Generic drug:  dicyclomine Take 20 mg by mouth daily as needed. buPROPion  mg SR tablet Commonly known as:  WELLBUTRIN SR  
TAKE 1 TABLET BY MOUTH TWICE DAILY  
  
 insulin glargine 100 unit/mL (3 mL) Inpn Commonly known as:  LANTUS,BASAGLAR  
25 Units by SubCUTAneous route nightly. insulin lispro 100 unit/mL kwikpen Commonly known as:  HUMALOG  
4 Units by SubCUTAneous route Before breakfast, lunch, and dinner. Or as directed Liraglutide 0.6 mg/0.1 mL (18 mg/3 mL) Pnij Commonly known as:  VICTOZA  
1.8 mg by SubCUTAneous route daily. Mary Pen Needle 32 gauge x \" Ndle Generic drug:  Insulin Needles (Disposable) USE AS DIRECTED TWICE DAILY  
  
 naproxen 500 mg tablet Commonly known as:  NAPROSYN  
TAKE 1 TABLET BY MOUTH TWICE DAILY WITH MEALS One Touch Delica 33 gauge Misc Generic drug:  lancets TEST THREE TIMES DAILY  
  
 ONETOUCH ULTRA TEST strip Generic drug:  glucose blood VI test strips TEST 3 TIMES A DAY * Notice: This list has 2 medication(s) that are the same as other medications prescribed for you. Read the directions carefully, and ask your doctor or other care provider to review them with you. Prescriptions Sent to Pharmacy Refills Liraglutide (VICTOZA) 0.6 mg/0.1 mL (18 mg/3 mL) pnij 11 Si.8 mg by SubCUTAneous route daily. Class: Normal  
 Pharmacy: [facility] [address] AT [address] [place] [address] Ph #: [phone] Route: SubCUTAneous  
 insulin lispro (HUMALOG) 100 unit/mL kwikpen 11 Si Units by SubCUTAneous route Before breakfast, lunch, and dinner. Or as directed Class: Normal  
 Pharmacy: [facility] [address] [place] [address] AT [address] [facility] Ph #: [phone] Route: SubCUTAneous  
 insulin glargine (LANTUS,BASAGLAR) 100 unit/mL (3 mL) inpn 11 Si Units by SubCUTAneous route nightly. Class: Normal  
 Pharmacy: [facility] [address] AT [address] [facility] Ph #: [phone]  Route: SubCUTAneous  
 atorvastatin (LIPITOR) 10 mg tablet 11  
 Sig: Take 1 Tab by mouth daily. Class: Normal  
 Pharmacy: [facility] [address] [facility], [address] AT [address] [facility]  #: [phone] Route: Oral  
  
We Performed the Following CK X3110352 CPT(R)] HEMOGLOBIN A1C WITH EAG [38888 CPT(R)] INFLUENZA VIRUS VAC QUAD,SPLIT,PRESV FREE SYRINGE IM [id number] CPT(R)] MICROALBUMIN, UR, RAND W/ MICROALBUMIN/CREA RATIO [id number] CPT(R)] AL IMMUNIZ ADMIN,1 SINGLE/COMB VAC/TOXOID [id number] CPT(R)] Follow-up Instructions Return in about 3 months (around 2/8/2018). Patient Instructions Vaccine Information Statement Influenza (Flu) Vaccine (Inactivated or Recombinant): What you need to know Many Vaccine Information Statements are available in Indonesian and other languages. See www.immunize.org/vis Hojas de Información Sobre Vacunas están disponibles en Español y en muchos otros idiomas. Visite www.immunize.org/vis 1. Why get vaccinated? Influenza (flu) is a contagious disease that spreads around the [facility] every year, usually between October and May. Flu is caused by influenza viruses, and is spread mainly by coughing, sneezing, and close contact. Anyone can get flu. Flu strikes suddenly and can last several days. Symptoms vary by age, but can include: 
 fever/chills  sore throat  muscle aches  fatigue  cough  headache  runny or stuffy nose Flu can also lead to pneumonia and blood infections, and cause diarrhea and seizures in children. If you have a medical condition, such as heart or lung disease, flu can make it worse. Flu is more dangerous for some people. Infants and young children, people 72years of age and older, pregnant women, and people with certain health conditions or a weakened immune system are at greatest risk. Each year thousands of people in the [facility] die from flu, and many more are hospitalized. Flu vaccine can:  keep you from getting flu, 
 make flu less severe if you do get it, and 
 keep you from spreading flu to your family and other people. 2. Inactivated and recombinant flu vaccines A dose of flu vaccine is recommended every flu season. Children 6 months through 6years of age may need two doses during the same flu season. Everyone else needs only one dose each flu season. Some inactivated flu vaccines contain a very small amount of a mercury-based preservative called thimerosal. Studies have not shown thimerosal in vaccines to be harmful, but flu vaccines that do not contain thimerosal are available. There is no live flu virus in flu shots. They cannot cause the flu. There are many flu viruses, and they are always changing. Each year a new flu vaccine is made to protect against three or four viruses that are likely to cause disease in the upcoming flu season. But even when the vaccine doesnt exactly match these viruses, it may still provide some protection Flu vaccine cannot prevent: 
 flu that is caused by a virus not covered by the vaccine, or 
 illnesses that look like flu but are not. It takes about 2 weeks for protection to develop after vaccination, and protection lasts through the flu season. 3. Some people should not get this vaccine Tell the person who is giving you the vaccine:  If you have any severe, life-threatening allergies. If you ever had a life-threatening allergic reaction after a dose of flu vaccine, or have a severe allergy to any part of this vaccine, you may be advised not to get vaccinated. Most, but not all, types of flu vaccine contain a small amount of egg protein.  If you ever had Guillain-Barré Syndrome (also called GBS). Some people with a history of GBS should not get this vaccine. This should be discussed with your doctor.  If you are not feeling well. It is usually okay to get flu vaccine when you have a mild illness, but you might be asked to come back when you feel better. 4. Risks of a vaccine reaction With any medicine, including vaccines, there is a chance of reactions. These are usually mild and go away on their own, but serious reactions are also possible. Most people who get a flu shot do not have any problems with it. Minor problems following a flu shot include:  
 soreness, redness, or swelling where the shot was given  hoarseness  sore, red or itchy eyes  cough  fever  aches  headache  itching  fatigue If these problems occur, they usually begin soon after the shot and last 1 or 2 days. More serious problems following a flu shot can include the following:  There may be a small increased risk of Guillain-Barré Syndrome (GBS) after inactivated flu vaccine. This risk has been estimated at 1 or 2 additional cases per million people vaccinated. This is much lower than the risk of severe complications from flu, which can be prevented by flu vaccine.  Young children who get the flu shot along with pneumococcal vaccine (PCV13) and/or DTaP vaccine at the same time might be slightly more likely to have a seizure caused by fever. Ask your doctor for more information. Tell your doctor if a child who is getting flu vaccine has ever had a seizure. Problems that could happen after any injected vaccine:  People sometimes faint after a medical procedure, including vaccination. Sitting or lying down for about 15 minutes can help prevent fainting, and injuries caused by a fall. Tell your doctor if you feel dizzy, or have vision changes or ringing in the ears.  Some people get severe pain in the shoulder and have difficulty moving the arm where a shot was given. This happens very rarely.  Any medication can cause a severe allergic reaction.  Such reactions from a vaccine are very rare, estimated at about 1 in a million doses, and would happen within a few minutes to a few hours after the vaccination. As with any medicine, there is a very remote chance of a vaccine causing a serious injury or death. The safety of vaccines is always being monitored. For more information, visit: www.cdc.gov/vaccinesafety/ 
 
5. What if there is a serious reaction? What should I look for?  Look for anything that concerns you, such as signs of a severe allergic reaction, very high fever, or unusual behavior. Signs of a severe allergic reaction can include hives, swelling of the face and throat, difficulty breathing, a fast heartbeat, dizziness, and weakness  usually within a few minutes to a few hours after the vaccination. What should I do?  If you think it is a severe allergic reaction or other emergency that cant wait, call 9-1-1 and get the person to the nearest hospital. Otherwise, call your doctor.  Reactions should be reported to the Vaccine Adverse Event Reporting System (VAERS). Your doctor should file this report, or you can do it yourself through  the VAERS web site at www.vaers. [facility].gov, or by calling 7-591.714.8292. VAERS does not give medical advice. 6. The National Vaccine Injury Compensation Program 
 
The [facility] Vaccine Injury Compensation Program (VICP) is a federal program that was created to compensate people who may have been injured by certain vaccines. Persons who believe they may have been injured by a vaccine can learn about the program and about filing a claim by calling 5-489.711.5374 or visiting the [employer] website at www.[facility].gov/vaccinecompensation. There is a time limit to file a claim for compensation. 7. How can I learn more?  Ask your healthcare provider. He or she can give you the vaccine package insert or suggest other sources of information.  Call your local or state health department.  Contact the Centers for Disease Control and Prevention (CDC): 
- Call 2-258.367.1519 (1-800-CDC-INFO) or 
- Visit CDCs website at www.cdc.gov/flu Vaccine Information Statement Inactivated Influenza Vaccine 8/7/2015 
42 LILI Arriaga [id number] Department of [facility] and [employer] Centers for Disease Control and Prevention Office Use Only Introducing [facility] & HEALTH SERVICES! Dear Julieta Jamison: 
Thank you for requesting a My [employer] Database account. Our records indicate that you already have an active My Fashion Database account. You can access your account anytime at [email or web address] Did you know that you can access your hospital and ER discharge instructions at any time in My Fashion Database? You can also review all of your test results from your hospital stay or ER visit. Additional Information If you have questions, please visit the Frequently Asked Questions section of the My Fashion Database website at [email or web address] Remember, My Fashion Database is NOT to be used for urgent needs. For medical emergencies, dial 911. Now available from your iPhone and Android! Please provide this summary of care documentation to your next provider. Your primary care clinician is listed as Donna Rizo. If you have any questions after today's visit, please call [phone].

## 2017-11-08 NOTE — PATIENT INSTRUCTIONS
Vaccine Information Statement    Influenza (Flu) Vaccine (Inactivated or Recombinant): What you need to know    Many Vaccine Information Statements are available in Urdu and other languages. See www.immunize.org/vis  Hojas de Información Sobre Vacunas están disponibles en Español y en muchos otros idiomas. Visite www.immunize.org/vis    1. Why get vaccinated? Influenza (flu) is a contagious disease that spreads around the [place] every year, usually between October and May. Flu is caused by influenza viruses, and is spread mainly by coughing, sneezing, and close contact. Anyone can get flu. Flu strikes suddenly and can last several days. Symptoms vary by age, but can include:   fever/chills   sore throat   muscle aches   fatigue   cough   headache    runny or stuffy nose    Flu can also lead to pneumonia and blood infections, and cause diarrhea and seizures in children. If you have a medical condition, such as heart or lung disease, flu can make it worse. Flu is more dangerous for some people. Infants and young children, people 72years of age and older, pregnant women, and people with certain health conditions or a weakened immune system are at greatest risk. Each year thousands of people in the [facility] die from flu, and many more are hospitalized. Flu vaccine can:   keep you from getting flu,   make flu less severe if you do get it, and   keep you from spreading flu to your family and other people. 2. Inactivated and recombinant flu vaccines    A dose of flu vaccine is recommended every flu season. Children 6 months through 6years of age may need two doses during the same flu season. Everyone else needs only one dose each flu season.        Some inactivated flu vaccines contain a very small amount of a mercury-based preservative called thimerosal. Studies have not shown thimerosal in vaccines to be harmful, but flu vaccines that do not contain thimerosal are available. There is no live flu virus in flu shots. They cannot cause the flu. There are many flu viruses, and they are always changing. Each year a new flu vaccine is made to protect against three or four viruses that are likely to cause disease in the upcoming flu season. But even when the vaccine doesnt exactly match these viruses, it may still provide some protection    Flu vaccine cannot prevent:   flu that is caused by a virus not covered by the vaccine, or   illnesses that look like flu but are not. It takes about 2 weeks for protection to develop after vaccination, and protection lasts through the flu season. 3. Some people should not get this vaccine    Tell the person who is giving you the vaccine:     If you have any severe, life-threatening allergies. If you ever had a life-threatening allergic reaction after a dose of flu vaccine, or have a severe allergy to any part of this vaccine, you may be advised not to get vaccinated. Most, but not all, types of flu vaccine contain a small amount of egg protein.  If you ever had Guillain-Barré Syndrome (also called GBS). Some people with a history of GBS should not get this vaccine. This should be discussed with your doctor.  If you are not feeling well. It is usually okay to get flu vaccine when you have a mild illness, but you might be asked to come back when you feel better. 4. Risks of a vaccine reaction    With any medicine, including vaccines, there is a chance of reactions. These are usually mild and go away on their own, but serious reactions are also possible. Most people who get a flu shot do not have any problems with it.      Minor problems following a flu shot include:    soreness, redness, or swelling where the shot was given     hoarseness   sore, red or itchy eyes   cough   fever   aches   headache   itching   fatigue  If these problems occur, they usually begin soon after the shot and last 1 or 2 days. More serious problems following a flu shot can include the following:     There may be a small increased risk of Guillain-Barré Syndrome (GBS) after inactivated flu vaccine. This risk has been estimated at 1 or 2 additional cases per million people vaccinated. This is much lower than the risk of severe complications from flu, which can be prevented by flu vaccine.  Young children who get the flu shot along with pneumococcal vaccine (PCV13) and/or DTaP vaccine at the same time might be slightly more likely to have a seizure caused by fever. Ask your doctor for more information. Tell your doctor if a child who is getting flu vaccine has ever had a seizure. Problems that could happen after any injected vaccine:      People sometimes faint after a medical procedure, including vaccination. Sitting or lying down for about 15 minutes can help prevent fainting, and injuries caused by a fall. Tell your doctor if you feel dizzy, or have vision changes or ringing in the ears.  Some people get severe pain in the shoulder and have difficulty moving the arm where a shot was given. This happens very rarely.  Any medication can cause a severe allergic reaction. Such reactions from a vaccine are very rare, estimated at about 1 in a million doses, and would happen within a few minutes to a few hours after the vaccination. As with any medicine, there is a very remote chance of a vaccine causing a serious injury or death. The safety of vaccines is always being monitored. For more information, visit: www.cdc.gov/vaccinesafety/    5. What if there is a serious reaction? What should I look for?  Look for anything that concerns you, such as signs of a severe allergic reaction, very high fever, or unusual behavior.     Signs of a severe allergic reaction can include hives, swelling of the face and throat, difficulty breathing, a fast heartbeat, dizziness, and weakness  usually within a few minutes to a few hours after the vaccination. What should I do?  If you think it is a severe allergic reaction or other emergency that cant wait, call 9-1-1 and get the person to the nearest hospital. Otherwise, call your doctor.  Reactions should be reported to the Vaccine Adverse Event Reporting System (VAERS). Your doctor should file this report, or you can do it yourself through  the VAERS web site at www.vaers. [facility].gov, or by calling 9-608.464.8492. VAERS does not give medical advice. 6. The National Vaccine Injury Compensation Program    The [facility] Vaccine Injury Compensation Program (VICP) is a federal program that was created to compensate people who may have been injured by certain vaccines. Persons who believe they may have been injured by a vaccine can learn about the program and about filing a claim by calling 5-913.524.9859 or visiting the [employer] website at www.[facility].gov/vaccinecompensation. There is a time limit to file a claim for compensation. 7. How can I learn more?  Ask your healthcare provider. He or she can give you the vaccine package insert or suggest other sources of information.  Call your local or state health department.  Contact the Centers for Disease Control and Prevention (CDC):  - Call 9-390.772.2444 (1-800-CDC-INFO) or  - Visit CDCs website at www.cdc.gov/flu    Vaccine Information Statement   Inactivated Influenza Vaccine   8/7/2015  42 LILI Luna 322VO-59    Department of Health and Human Services  Centers for Disease Control and Prevention    Office Use Only

## 2017-11-09 LAB
ALBUMIN/CREAT UR: 10.2 MG/G CREAT (ref 0–30)
CK SERPL-CCNC: 68 U/L (ref 24–173)
CREAT UR-MCNC: 285.5 MG/DL
EST. AVERAGE GLUCOSE BLD GHB EST-MCNC: 275 MG/DL
HBA1C MFR BLD: 11.2 % (ref 4.8–5.6)
MICROALBUMIN UR-MCNC: 29.2 UG/ML

## 2017-12-14 RX ORDER — LANCETS
EACH MISCELLANEOUS
Qty: 100 EACH | Refills: 11 | Status: SHIPPED | OUTPATIENT
Start: 2017-12-14 | End: 2020-12-17

## 2017-12-14 RX ORDER — INSULIN PUMP SYRINGE, 3 ML
EACH MISCELLANEOUS
Qty: 1 KIT | Refills: 0 | Status: SHIPPED | OUTPATIENT
Start: 2017-12-14 | End: 2022-01-24

## 2018-01-31 ENCOUNTER — OFFICE VISIT (OUTPATIENT)
Dept: ONCOLOGY | Age: 48
End: 2018-01-31

## 2018-01-31 ENCOUNTER — HOSPITAL ENCOUNTER (OUTPATIENT)
Dept: MAMMOGRAPHY | Age: 48
Discharge: HOME | End: 2018-01-31
Attending: NURSE PRACTITIONER
Payer: COMMERCIAL

## 2018-01-31 VITALS
HEIGHT: 61 IN | BODY MASS INDEX: 47.95 KG/M2 | HEART RATE: 96 BPM | SYSTOLIC BLOOD PRESSURE: 123 MMHG | OXYGEN SATURATION: 98 % | DIASTOLIC BLOOD PRESSURE: 80 MMHG | WEIGHT: 254 LBS | TEMPERATURE: 98.6 F | RESPIRATION RATE: 14 BRPM
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
## 2018-01-31 DIAGNOSIS — Z98.890 S/P LUMPECTOMY, LEFT BREAST: ICD-10-CM

## 2018-01-31 DIAGNOSIS — M79.602 LEFT ARM PAIN: ICD-10-CM

## 2018-01-31 DIAGNOSIS — Z85.3 HISTORY OF LEFT BREAST CANCER: Primary | ICD-10-CM

## 2018-01-31 DIAGNOSIS — C50.912 MALIGNANT NEOPLASM OF LEFT BREAST IN FEMALE, ESTROGEN RECEPTOR NEGATIVE, UNSPECIFIED SITE OF BREAST (HCC): ICD-10-CM

## 2018-01-31 DIAGNOSIS — Z98.890 HISTORY OF LUMPECTOMY OF LEFT BREAST: ICD-10-CM

## 2018-01-31 DIAGNOSIS — Z17.1 MALIGNANT NEOPLASM OF LEFT BREAST IN FEMALE, ESTROGEN RECEPTOR NEGATIVE, UNSPECIFIED SITE OF BREAST (HCC): ICD-10-CM

## 2018-01-31 PROBLEM — E66.01 OBESITY, MORBID (HCC): Status: ACTIVE | Noted: 2018-01-31

## 2018-01-31 PROCEDURE — 77066 DX MAMMO INCL CAD BI: CPT

## 2018-01-31 RX ORDER — LANCETS 28 GAUGE
EACH MISCELLANEOUS
Refills: 11 | COMMUNITY
Start: 2018-01-11

## 2018-01-31 RX ORDER — CONJUGATED ESTROGENS 0.62 MG/G
CREAM VAGINAL
Refills: 1 | COMMUNITY
Start: 2017-12-15 | End: 2018-05-30

## 2018-01-31 NOTE — PROGRESS NOTES
HEME/ONC PROGRESS NOTE       Milagro Almaguer is a 49 y.o. 1970 female and presents with Follow-up (breast can- left breast)    CC  Triple neg left breast cancer 8/16    HPI: Ms. Sujit Castro is here for follow-up today for triple negative breast cancer, not on any therapy. She reports she has been feeling well overall. She has developed new soreness in the left breast and left arm over the past month. She feels like she has \"cording\" to the left arm. She is able to move her left arm but it is difficult to move it back due to pain. She sees Dr. Julee Manuel on 7/46/74. She has noticed she falls more frequently, about 3 times per month. She denies headaches or vision changes. No weakness of the arms of legs. She denies lightheadedness or dizziness. She does not know why she falls but thinks maybe she is moving too fast. She denies fevers or infections No shortness of breath. She denies nausea, vomiting, or diarrhea. She does have chronic constipation secondary to IBS and reports this is stable. Appetite has been good. Otherwise, complete ROS is per the symptom report form which has been scanned into the media section of the electronic medical record. DX   Encounter Diagnoses   Name Primary?  Malignant neoplasm of left breast in female, estrogen receptor negative, unspecified site of breast ([facility] [place] [facility] Yes    Left arm pain     Fall, sequela     S/P lumpectomy, left breast         STAGE: T2N0 triple neg    TREATMENT COURSE: lumpectomy 10/16 with b/l reduction, Adjuvant chemotherapy with TAC x 4, stopped due to peripheral neuropathy  Radiation 3/17 at [facility].      Past Medical History:   Diagnosis Date    Arthritis     Asthma     Cancer of left breast ([facility] 08/01/2016    BREAST left lumpectomy 10/16 with b/l reduction, Adjuvant chemotherapy with TAC x 4,stopped due to peripheral neuropathy,Radiation 3/17 at [facility] Chronic pain     lower extremities    Diabetes ([facility] 2012    Dyslipidemia     IBS (irritable bowel syndrome)     Keloid 2017    Excision of keloid scar approximately 4 cm -Suzanne Cabrera MD     Nausea & vomiting     Noncompliance     Obesity     Psychiatric disorder     Radiation therapy complication 5491    left    S/P breast biopsy, left 16    Sinusitis     Stress at home      Past Surgical History:   Procedure Laterality Date    HX BREAST LUMPECTOMY Left 10/13/2016    LEFT BREAST REDUCTION LUMPECTOMY, LEFT SENTINEL NODE BIOPSY, LEFT BRACKETED NEEDLE LOCALIZATION, LEFT BREAST RECONSTRUCTION performed by Suzanne Cabrera MD at [address] HX BREAST RECONSTRUCTION Bilateral 10/13/2016    BREAST REDUCTION performed by Royal Noe MD at [address] HX BREAST REDUCTION Right 2016    HX  SECTION  [phone]    X2    HX GYN  2002    ABLATION    HX HERNIA REPAIR       umbilical as infant    HX HYSTERECTOMY      rosangela bso    HX ORTHOPAEDIC Left     left dequervains    HX VASCULAR ACCESS Right 2016,2017    insertiona and removal of power port     Social History     Social History    Marital status: SINGLE     Spouse name: N/A    Number of children: N/A    Years of education: N/A     Social History Main Topics    Smoking status: Never Smoker    Smokeless tobacco: Never Used    Alcohol use No    Drug use: No    Sexual activity: Yes     Partners: Male     Birth control/ protection: None     Other Topics Concern    None     Social History Narrative         Medical History: Recurrent depressive d/o 2012Muscloskeletal back pain 2012Carpal tunnel syndrome 2012Asthma 1979Obesity 2012IBS (nima De) 2012diabetes mellitus type 2 2013    Gyn History: Last mammogram date 2013. Surgical History:  x2, ROSANGELA 10/02    Hospitalization/Major Diagnostic Procedure: asthma childhood    Family History:  Mother: alive 58 yrs CAD, HTN, depression Father: alive 61 yrs Brother(s): 40 yrs mental issues Son(s): alive 19,23 yrs 1:    bipolar and schizophrenia on SSI kelley [id number] 1 brother(s) . 2 son(s) . Social History: Alcohol Use Patient does not use alcohol. Smoking Status Patient is a never smoker. Marital Status: Single. Lives w ith: alone    son. Occupation/W ork: employed full time [employer] at [employer]. Education/School: has highschool diploma, college. Faith: 5th Muslim.     Family History   Problem Relation Age of Onset    Heart Disease Mother     Hypertension Mother     Heart Disease Father     Seizures Sister     Anesth Problems Neg Hx        Current Outpatient Prescriptions   Medication Sig Dispense Refill    PREMARIN 0.625 mg/gram vaginal cream ALYSON  0.5GM  VAGINALLY  TWICE  WEEKLY  1    FREESTYLE LANCETS 28 gauge misc USE TO TEST BLOOD SUGAR TID  11    Blood-Glucose Meter (FREESTYLE LITE METER) monitoring kit Use to test blood sugar three times daily. Dx.e119 1 Kit 0    glucose blood VI test strips (FREESTYLE LITE STRIPS) strip Use to test blood sugar three times daily. dx e11.9 100 Strip 11    Lancets misc Use to test blood sugar three times daily. dx.e11.9 100 Each 11    ONETOUCH ULTRA TEST strip TEST THREE TIMES DAILY 100 Strip 11    Liraglutide (VICTOZA) 0.6 mg/0.1 mL (18 mg/3 mL) pnij 1.8 mg by SubCUTAneous route daily. 4 Adjustable Dose Pre-filled Pen Syringe 11    KELLY PEN NEEDLE 32 gauge x 5/32\" ndle USE AS DIRECTED TWICE DAILY 100 Pen Needle 11    insulin lispro (HUMALOG) 100 unit/mL kwikpen 4 Units by SubCUTAneous route Before breakfast, lunch, and dinner. Or as directed 1 Package 11    insulin glargine (LANTUS,BASAGLAR) 100 unit/mL (3 mL) inpn 25 Units by SubCUTAneous route nightly. 5 Adjustable Dose Pre-filled Pen Syringe 11    atorvastatin (LIPITOR) 10 mg tablet Take 1 Tab by mouth daily. 30 Tab 11    dicyclomine (BENTYL) 10 mg capsule Take 20 mg by mouth daily as needed.       naproxen (NAPROSYN) 500 mg tablet TAKE 1 TABLET BY MOUTH TWICE DAILY WITH MEALS 60 Tab 6    buPROPion SR (WELLBUTRIN SR) 150 mg SR tablet TAKE 1 TABLET BY MOUTH TWICE DAILY 30 Tab 11    ONE TOUCH DELICA 33 gauge misc TEST THREE TIMES DAILY 100 Lancet 11    albuterol (PROVENTIL VENTOLIN) 2.5 mg /3 mL (0.083 %) nebulizer solution every four (4) hours as needed.  albuterol (PROVENTIL HFA, VENTOLIN HFA, PROAIR HFA) 90 mcg/actuation inhaler Take 2 Puffs by inhalation every four (4) hours as needed for Wheezing. 1 Inhaler 11       Allergies   Allergen Reactions    Codeine Diarrhea    Pravastatin Myalgia     Terrible leg aching.  Theophylline Diarrhea       Review of Systems    A comprehensive review of systems was negative except for: per HPI     ECOG PS 0. Objective:  Visit Vitals    /80 (BP 1 Location: Right arm, BP Patient Position: Sitting)    Pulse 96    Temp 98.6 °F (37 °C) (Oral)    Resp 14    Ht 5' 1\" (1.549 m)    Wt 254 lb (115.2 kg)    SpO2 98%    BMI 47.99 kg/m2       Physical Exam:   General appearance - alert, well appearing, and in no distress, oriented to person, place, and time and overweight  Mental status - alert, oriented to person, place, and time, normal mood, behavior, speech, dress, motor activity, and thought processes  EYE-conj clear  Mouth - mucous membranes pink, moist, intact. No lesions or thrush. Neck - supple, no adenopathy appreciated  Chest - clear to auscultation bilaterally  Heart - normal rate and regular rhythm   Abdomen - round, soft, nontender  Ext-no pedal edema noted bilaterally  Skin-Warm and dry. Intact without rash. Neuro -alert, oriented, normal speech, no focal findings or movement disorder noted. PERRL. Finger to nose intact bilaterally.  strength equal bilaterally. Breast - No palpable masses bilaterally. Left breast with fullness noted to the lower inner quadrant which patient reports is unchanged. No axillary adenopathy appreciated bilaterally. Patient reports pain with raising of left arm.     Diagnostic Imaging reviewed  [facility] Results (most recent):    [facility]   [facility] MAMMO BI DX INCL CAD   Narrative INDICATION: Bilateral diagnostic mammography per lumpectomy protocol in patient  status post breast conserving therapy October 2016, which was accompanied by  bilateral reduction mammoplasty and followed by chemotherapy and radiation  therapy. EXAM: Bilateral   Digital diagnostic Mammography. COMPARISON: Prior studies dating back to 2011 . PROCEDURE: 7 images were performed bilaterally, using digital technique and CAD  overview. FINDINGS:   BREAST COMPOSITION: There are scattered fibroglandular densities (approximately  25-50% glandular). There is mild asymmetry. Architectural distortion diffusely is consistent with  reduction mammoplasty. In the left lower inner breast, there is curvilinear  extensive regional microcalcification most consistent with dystrophic  calcification and fat necrosis at the site of breast conserving therapy. This  also corresponds with the location of pain reported by the patient. There are no  obvious signs of residual or recurrent malignancy. Impression IMPRESSION:  1. Assessment Category 3: Probably benign finding. Short-interval follow-up  suggested. . Probably benign extensive microcalcification in the lumpectomy bed  on the left on baseline postoperative study, most suggestive of fat necrosis. 2. Follow-up mammography of the left breast only is recommended in 6 months with  magnification to confirm stability and establish a baseline pattern. Follow-up  in one year on the right. .  3. These findings have been relayed to the patient. .      Lab Results  reviewed  Lab Results   Component Value Date/Time    WBC 7.0 07/27/2017 09:55 AM    HGB 12.1 07/27/2017 09:55 AM    HCT 36.4 07/27/2017 09:55 AM    PLATELET 668 87/82/4048 09:55 AM    MCV 86.3 07/27/2017 09:55 AM       Lab Results   Component Value Date/Time    Sodium 142 07/27/2017 09:55 AM    Potassium 4.5 07/27/2017 09:55 AM    Chloride 107 07/27/2017 09:55 AM    CO2 30 07/27/2017 09:55 AM    Anion gap 5 07/27/2017 09:55 AM    Glucose 228 07/27/2017 09:55 AM    BUN 9 07/27/2017 09:55 AM    Creatinine 0.57 07/27/2017 09:55 AM    BUN/Creatinine ratio 16 07/27/2017 09:55 AM    GFR est AA >60 07/27/2017 09:55 AM    GFR est non-AA >60 07/27/2017 09:55 AM    Calcium 8.8 07/27/2017 09:55 AM    AST (SGOT) 15 03/24/2017 12:28 PM    Alk. phosphatase 72 03/24/2017 12:28 PM    Protein, total 6.7 03/24/2017 12:28 PM    Albumin 4.2 03/24/2017 12:28 PM    Globulin 2.8 02/21/2017 08:24 AM    A-G Ratio 1.7 03/24/2017 12:28 PM    ALT (SGPT) 12 03/24/2017 12:28 PM       Assessment/Plan:     Frances Street is a  49 y.o. female and presents with Breast Cancer    CC  Triple neg left breast cancer 8/16    HPI  Pt seen today for office fu for triple neg left breast cancer. DX   Encounter Diagnoses   Name Primary?  Triple negative malignant neoplasm of breast ([address] Yes    S/P lumpectomy, left breast     Stage 2 carcinoma of breast, ER-, left ([address]     Morbid obesity due to excess calories ([address]     Type 2 diabetes mellitus without complication, unspecified long term insulin use status (HCC)     Irritable bowel syndrome without diarrhea     Essential hypertension       STAGE: T2N0 triple neg    TREATMENT COURSE: lumpectomy 10/16, Adjuvant TAC x 4 cycles, adjuvant radiation    1. Stage 2 triple neg breast cancer s/p lumpectomy/ b/l breast reduction. She completed chemotherapy with TAC x 4. Stopped due to side effects. She has had no evidence of recurrent disease. She completed adjuvant [facility]. Right breast mammogram completed in June 2017. She is due for bilateral mammogram at this time, ordered today. She has an appointment with breast surgery on 2/12/18 for evaluation as well. Discussed that breast surgery can also evaluate new breast pain in the office.     Last labs July 2017 were stable. Repeat labs ordered today (CBC with diff and CMP). 2. HTN/ asthma/ DM. Per PCP. 3. Peripheral neuropathy. Improving per patient. Secondary to chemotherapy as well as diabetes. 4. Left arm/axillary pain. Doppler ordered today to r/o DVT. If negative, will refer to cancer rehab for physical therapy. Patient is agreeable to this plan. 5. Frequent falls unclear cause/ pt concerned about this. MRI brain ordered due to breast cancer history. Follow-up in 3 months. Seen in conjunction with Jose Ferrer NP. ICD-10-CM ICD-9-CM    1. Malignant neoplasm of left breast in female, estrogen receptor negative, unspecified site of breast (HCC) C50.912 174.9 CBC WITH AUTOMATED DIFF    W37.9 A28.2 METABOLIC PANEL, COMPREHENSIVE      [facility]VA MAMMO BI DX INCL CAD      DUPLEX UPPER EXT VENOUS LEFT      MRI BRAIN W WO CONT   2. Left arm pain M79.602 729.5 DUPLEX UPPER EXT VENOUS LEFT   3. Fall, sequela W19. XXXS 909.4 MRI BRAIN W WO CONT     E929.3    4. S/P lumpectomy, left breast Z98.890 V45.89        Current Outpatient Prescriptions   Medication Sig    PREMARIN 0.625 mg/gram vaginal cream ALYSON  0.5GM  VAGINALLY  TWICE  WEEKLY    FREESTYLE LANCETS 28 gauge misc USE TO TEST BLOOD SUGAR TID    Blood-Glucose Meter (FREESTYLE LITE METER) monitoring kit Use to test blood sugar three times daily. Dx.e119    glucose blood VI test strips (FREESTYLE LITE STRIPS) strip Use to test blood sugar three times daily. dx e11.9    Lancets misc Use to test blood sugar three times daily. dx.e11.9    ONETOUCH ULTRA TEST strip TEST THREE TIMES DAILY    Liraglutide (VICTOZA) 0.6 mg/0.1 mL (18 mg/3 mL) pnij 1.8 mg by SubCUTAneous route daily.  KELLY PEN NEEDLE 32 gauge x 5/32\" ndle USE AS DIRECTED TWICE DAILY    insulin lispro (HUMALOG) 100 unit/mL kwikpen 4 Units by SubCUTAneous route Before breakfast, lunch, and dinner.  Or as directed    insulin glargine (LANTUS,BASAGLAR) 100 unit/mL (3 mL) inpn 25 Units by SubCUTAneous route nightly.  atorvastatin (LIPITOR) 10 mg tablet Take 1 Tab by mouth daily.  dicyclomine (BENTYL) 10 mg capsule Take 20 mg by mouth daily as needed.  naproxen (NAPROSYN) 500 mg tablet TAKE 1 TABLET BY MOUTH TWICE DAILY WITH MEALS    buPROPion SR (WELLBUTRIN SR) 150 mg SR tablet TAKE 1 TABLET BY MOUTH TWICE DAILY    ONE TOUCH DELICA 33 gauge misc TEST THREE TIMES DAILY    albuterol (PROVENTIL VENTOLIN) 2.5 mg /3 mL (0.083 %) nebulizer solution every four (4) hours as needed.  albuterol (PROVENTIL HFA, VENTOLIN HFA, PROAIR HFA) 90 mcg/actuation inhaler Take 2 Puffs by inhalation every four (4) hours as needed for Wheezing. No current facility-administered medications for this visit. continue present plan, call if any problems  There are no Patient Instructions on file for this visit. Follow-up Disposition:  Return in about 3 months (around 4/30/2018).     Ellen Beard, MER

## 2018-01-31 NOTE — MR AVS SNAPSHOT
[address] [facility] [address] [place] [address] 
[phone] Patient: Elin Bates MRN: [id number] :1970 Visit Information Date & Time Provider Department Dept. Phone Encounter #  
 2018  9:45 AM Gorge Flores  [facility] at [address] [id number] Follow-up Instructions Return in about 3 months (around 2018). Your Appointments 2018  9:30 AM  
Any with Rickie Soriano MD  
[address] [place] [address] and Primary Care [address] [place] [address]) Appt Note: follow up 3mnths  
 [address] [facility]  
  
   
 [address] [id number]  
  
    
 2018  8:30 AM  
Follow Up with Jazmine Kaufman MD  
[address]) Appt Note: 6 month follow up/cc reports/cp?/St  
 [place]  Kennis Prey Leopold Garbe [address]  
  
   
 [place] [id number] [facility] [address] [facility] Upcoming Health Maintenance Date Due Pneumococcal 19-64 Highest Risk (1 of 3 - PCV13) 1988 DTaP/Tdap/Td series (1 - Tdap) 1990 EYE EXAM RETINAL OR DILATED Q1 2016 PAP AKA CERVICAL CYTOLOGY 2017 FOOT EXAM Q1 3/24/2018 LIPID PANEL Q1 3/24/2018 HEMOGLOBIN A1C Q6M 2018 MICROALBUMIN Q1 2018 Allergies as of 2018  Review Complete On: 2018 By: Gorge Flores NP Severity Noted Reaction Type Reactions Codeine  2011    Diarrhea Pravastatin  2016    Myalgia Terrible leg aching. Theophylline  2011    Diarrhea Current Immunizations  Reviewed on 10/17/2017 Name Date Influenza Vaccine 2016 Influenza Vaccine (Quad) PF 2017 Not reviewed this visit You Were Diagnosed With   
  
 Codes Comments  Malignant neoplasm of left breast in female, estrogen receptor negative, unspecified site of breast ([facility]    -  Primary ICD-10-CM: C50.912, Z17.1 ICD-9-CM: 174.9, V86.1 Left arm pain     ICD-10-CM: M79.602 ICD-9-CM: 729.5 Fall, sequela     ICD-10-CM: W19. XXXS 
ICD-9-CM: 909.4, E929.3 Vitals BP Pulse Temp Resp Height(growth percentile) Weight(growth percentile) 123/80 (BP 1 Location: Right arm, BP Patient Position: Sitting) 96 98.6 °F (37 °C) (Oral) 14 5' 1\" (1.549 m) 254 lb (115.2 kg) SpO2 BMI OB Status Smoking Status 98% 47.99 kg/m2 Hysterectomy Never Smoker Vitals History BMI and BSA Data Body Mass Index Body Surface Area  
 47.99 kg/m 2 2.23 m 2 Preferred Pharmacy Pharmacy Name Phone [facility] [address] [place] [address] [phone] Your Updated Medication List  
  
   
This list is accurate as of: 1/31/18 10:44 AM.  Always use your most recent med list.  
  
  
  
  
 * albuterol 90 mcg/actuation inhaler Commonly known as:  PROVENTIL HFA, VENTOLIN HFA, PROAIR HFA Take 2 Puffs by inhalation every four (4) hours as needed for Wheezing. * albuterol 2.5 mg /3 mL (0.083 %) nebulizer solution Commonly known as:  PROVENTIL VENTOLIN  
every four (4) hours as needed. atorvastatin 10 mg tablet Commonly known as:  LIPITOR Take 1 Tab by mouth daily. BENTYL 10 mg capsule Generic drug:  dicyclomine Take 20 mg by mouth daily as needed. Blood-Glucose Meter monitoring kit Commonly known as:  FREESTYLE LITE METER Use to test blood sugar three times daily. Dx.e119  
  
 buPROPion  mg SR tablet Commonly known as:  WELLBUTRIN SR  
TAKE 1 TABLET BY MOUTH TWICE DAILY  
  
 insulin glargine 100 unit/mL (3 mL) Inpn Commonly known as:  LANTUS,BASAGLAR  
25 Units by SubCUTAneous route nightly. insulin lispro 100 unit/mL kwikpen Commonly known as:  HUMALOG 4 Units by SubCUTAneous route Before breakfast, lunch, and dinner. Or as directed Liraglutide 0.6 mg/0.1 mL (18 mg/3 mL) Pnij Commonly known as:  VICTOZA  
1.8 mg by SubCUTAneous route daily. Mary Pen Needle 32 gauge x 5/32\" Ndle Generic drug:  Insulin Needles (Disposable) USE AS DIRECTED TWICE DAILY  
  
 naproxen 500 mg tablet Commonly known as:  NAPROSYN  
TAKE 1 TABLET BY MOUTH TWICE DAILY WITH MEALS  
  
 * One Touch Delica 33 gauge Misc Generic drug:  lancets TEST THREE TIMES DAILY * Lancets Misc Use to test blood sugar three times daily. dx.e11.9 * FREESTYLE LANCETS 28 gauge Misc Generic drug:  lancets USE TO TEST BLOOD SUGAR TID * ONETOUCH ULTRA TEST strip Generic drug:  glucose blood VI test strips TEST THREE TIMES DAILY * glucose blood VI test strips strip Commonly known as:  FREESTYLE LITE STRIPS Use to test blood sugar three times daily. dx e11.9 PREMARIN 0.625 mg/gram vaginal cream  
Generic drug:  conjugated estrogens ALYSON  0.5GM  VAGINALLY  TWICE  WEEKLY * Notice: This list has 7 medication(s) that are the same as other medications prescribed for you. Read the directions carefully, and ask your doctor or other care provider to review them with you. We Performed the Following CBC WITH AUTOMATED DIFF [16651 CPT(R)] METABOLIC PANEL, COMPREHENSIVE [96501 CPT(R)] Follow-up Instructions Return in about 3 months (around 4/30/2018). To-Do List   
 01/31/2018 Imaging:  DUPLEX UPPER EXT VENOUS LEFT   
  
 01/31/2018 Imaging:  [facility] MAMMO BI DX INCL CAD   
  
 01/31/2018 Imaging:  MRI BRAIN W WO CONT Referral Information Referral ID Referred By Referred To [id number] Mary COLIN Not Available Visits Status Start Date End Date 1 New Request 1/31/18 1/31/19  If your referral has a status of pending review or denied, additional information will be sent to support the outcome of this decision. Introducing \[facility] [place] [facility]\"" & HEALTH SERVICES! Dear Laureen Wall: 
Thank you for requesting a [employer] account. Our records indicate that you already have an active [employer] account. You can access your account anytime at [email or web address]. [email or web address] Did you know that you can access your hospital and ER discharge instructions at any time in [employer]? You can also review all of your test results from your hospital stay or ER visit. Additional Information If you have questions, please visit the Frequently Asked Questions section of the [employer] website at [email or web address] Remember, [employer] is NOT to be used for urgent needs. For medical emergencies, dial 911. Now available from your iPhone and Android! Please provide this summary of care documentation to your next provider. Your primary care clinician is listed as Gray Brar. If you have any questions after today's visit, please call [phone].

## 2018-01-31 NOTE — PROGRESS NOTES
1. Have you been to the ER, urgent care clinic since your last visit? Hospitalized since your last visit? No    2. Have you seen or consulted any other health care providers outside of the [address] [place] [address] since your last visit? Include any pap smears or colon screening.  No

## 2018-02-01 ENCOUNTER — DOCUMENTATION ONLY (OUTPATIENT)
Dept: ONCOLOGY | Age: 48
End: 2018-02-01

## 2018-02-01 DIAGNOSIS — Z17.1 MALIGNANT NEOPLASM OF LEFT BREAST IN FEMALE, ESTROGEN RECEPTOR NEGATIVE, UNSPECIFIED SITE OF BREAST (HCC): Primary | ICD-10-CM

## 2018-02-01 DIAGNOSIS — C50.912 MALIGNANT NEOPLASM OF LEFT BREAST IN FEMALE, ESTROGEN RECEPTOR NEGATIVE, UNSPECIFIED SITE OF BREAST (HCC): Primary | ICD-10-CM

## 2018-02-01 NOTE — PROGRESS NOTES
Mammogram is good, 6 month short-interval follow-up recommended with left breast mammogram, ordered. Joelle Cohen

## 2018-02-02 NOTE — PROGRESS NOTES
HIPAA verified. Advised of provider note and mammogram order mailed. Patient verbalized understanding and thanked for call.

## 2018-02-08 ENCOUNTER — HOSPITAL ENCOUNTER (OUTPATIENT)
Dept: MRI IMAGING | Age: 48
Discharge: HOME | End: 2018-02-08
Attending: NURSE PRACTITIONER
Payer: COMMERCIAL

## 2018-02-08 ENCOUNTER — HOSPITAL ENCOUNTER (OUTPATIENT)
Dept: VASCULAR SURGERY | Age: 48
Discharge: HOME | End: 2018-02-08
Attending: NURSE PRACTITIONER
Payer: COMMERCIAL

## 2018-02-08 VITALS — BODY MASS INDEX: 47.95 KG/M2 | HEIGHT: 61 IN | WEIGHT: 254 LBS

## 2018-02-08 DIAGNOSIS — C50.912 MALIGNANT NEOPLASM OF LEFT BREAST IN FEMALE, ESTROGEN RECEPTOR NEGATIVE, UNSPECIFIED SITE OF BREAST (HCC): ICD-10-CM

## 2018-02-08 DIAGNOSIS — Z17.1 MALIGNANT NEOPLASM OF LEFT BREAST IN FEMALE, ESTROGEN RECEPTOR NEGATIVE, UNSPECIFIED SITE OF BREAST (HCC): ICD-10-CM
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
## 2018-02-08 DIAGNOSIS — M79.602 LEFT ARM PAIN: ICD-10-CM

## 2018-02-08 LAB — CREAT BLD-MCNC: 0.7 MG/DL (ref 0.6–1.3)

## 2018-02-08 PROCEDURE — 82565 ASSAY OF CREATININE: CPT

## 2018-02-08 PROCEDURE — 70553 MRI BRAIN STEM W/O & W/DYE: CPT

## 2018-02-08 PROCEDURE — A9576 INJ PROHANCE MULTIPACK: HCPCS | Performed by: RADIOLOGY

## 2018-02-08 PROCEDURE — 74011250636 HC RX REV CODE- 250/636: Performed by: RADIOLOGY

## 2018-02-08 PROCEDURE — 93971 EXTREMITY STUDY: CPT

## 2018-02-08 RX ADMIN — GADOTERIDOL 20 ML: 279.3 INJECTION, SOLUTION INTRAVENOUS at 18:46

## 2018-02-08 NOTE — PROCEDURES
[facility]  *** PRELIMINARY REPORT ***    Name: Qasim Miramontes  MRN: [id number]    Outpatient  : 1970  HIS Order #: [id number]  [address] [facility] Visit #: [id number]  Date: 2018    TYPE OF TEST: Peripheral Venous Testing    REASON FOR TEST  Pain in limb, Limb swelling    Left Arm:-  Deep venous thrombosis:           No  Superficial venous thrombosis:    No      INTERPRETATION/FINDINGS  PROCEDURE: LEFT UPPER EXTREMITY VENOUS DUPLEX: Evaluation of upper  extremity veins with ultrasound (B-mode imaging, pulsed Doppler, color   Doppler). Includes the internal jugular, subclavian, axillary,  brachial, radial, ulnar, basilic, and cephalic veins. FINDINGS: Unable to fully evaluate the paired left ulnar veins or the  basilic vein within the  upper arm due to small vessel caliber and  patient body habitus. Gray scale and color flow duplex images of the  remaining veins of the left upper extremity and bilateral subclavian  veins demonstrate normal compressibility, absence of filing defects,  reflux or phlebitic changes of the internal jugular, bilateral  subclavian, axillary, upper arm and forearm veins of the left arm. CONCLUSION: Normal Left upper extremity venous duplex. No deep vein  thrombosis or thrombophlebitis. No evidence of thrombus in  contralateral right subclavian vein. ADDITIONAL COMMENTS    I have personally reviewed the data relevant to the interpretation of  this study.     TECHNOLOGIST: Primo Chahal RVT  Signed: 2018 08:11 AM

## 2018-02-09 ENCOUNTER — DOCUMENTATION ONLY (OUTPATIENT)
Dept: ONCOLOGY | Age: 48
End: 2018-02-09

## 2018-02-09 LAB
ALBUMIN SERPL-MCNC: 3.8 G/DL (ref 3.5–5.5)
ALBUMIN/GLOB SERPL: 1.5 {RATIO} (ref 1.2–2.2)
ALP SERPL-CCNC: 87 IU/L (ref 39–117)
ALT SERPL-CCNC: 13 IU/L (ref 0–32)
AST SERPL-CCNC: 12 IU/L (ref 0–40)
BASOPHILS # BLD AUTO: 0 X10E3/UL (ref 0–0.2)
BASOPHILS NFR BLD AUTO: 0 %
BILIRUB SERPL-MCNC: 0.4 MG/DL (ref 0–1.2)
BUN SERPL-MCNC: 13 MG/DL (ref 6–24)
BUN/CREAT SERPL: 21 (ref 9–23)
CALCIUM SERPL-MCNC: 8.9 MG/DL (ref 8.7–10.2)
CHLORIDE SERPL-SCNC: 102 MMOL/L (ref 96–106)
CO2 SERPL-SCNC: 28 MMOL/L (ref 18–29)
CREAT SERPL-MCNC: 0.61 MG/DL (ref 0.57–1)
EOSINOPHIL # BLD AUTO: 0 X10E3/UL (ref 0–0.4)
EOSINOPHIL NFR BLD AUTO: 0 %
ERYTHROCYTE [DISTWIDTH] IN BLOOD BY AUTOMATED COUNT: 14.2 % (ref 12.3–15.4)
GFR SERPLBLD CREATININE-BSD FMLA CKD-EPI: 108 ML/MIN/1.73
GFR SERPLBLD CREATININE-BSD FMLA CKD-EPI: 124 ML/MIN/1.73
GLOBULIN SER CALC-MCNC: 2.5 G/DL (ref 1.5–4.5)
GLUCOSE SERPL-MCNC: 223 MG/DL (ref 65–99)
HCT VFR BLD AUTO: 37.4 % (ref 34–46.6)
HGB BLD-MCNC: 12.2 G/DL (ref 11.1–15.9)
IMM GRANULOCYTES # BLD: 0 X10E3/UL (ref 0–0.1)
IMM GRANULOCYTES NFR BLD: 0 %
LYMPHOCYTES # BLD AUTO: 2.1 X10E3/UL (ref 0.7–3.1)
LYMPHOCYTES NFR BLD AUTO: 24 %
MCH RBC QN AUTO: 29 PG (ref 26.6–33)
MCHC RBC AUTO-ENTMCNC: 32.6 G/DL (ref 31.5–35.7)
MCV RBC AUTO: 89 FL (ref 79–97)
MONOCYTES # BLD AUTO: 0.5 X10E3/UL (ref 0.1–0.9)
MONOCYTES NFR BLD AUTO: 5 %
NEUTROPHILS # BLD AUTO: 6.3 X10E3/UL (ref 1.4–7)
NEUTROPHILS NFR BLD AUTO: 71 %
PLATELET # BLD AUTO: 245 X10E3/UL (ref 150–379)
POTASSIUM SERPL-SCNC: 4.5 MMOL/L (ref 3.5–5.2)
PROT SERPL-MCNC: 6.3 G/DL (ref 6–8.5)
RBC # BLD AUTO: 4.2 X10E6/UL (ref 3.77–5.28)
SODIUM SERPL-SCNC: 144 MMOL/L (ref 134–144)
WBC # BLD AUTO: 8.9 X10E3/UL (ref 3.4–10.8)

## 2018-02-09 NOTE — PROGRESS NOTES
15 [facility] vascular department regarding US to clarify report. No answer, left voicemail to call back. No HIPAA information disclosed on voicemail.

## 2018-02-09 NOTE — PROGRESS NOTES
Doppler report is negative, but upper extremity doppler and report states lower extremity was examined. Nursing, please call [address] department to clarify.

## 2018-02-12 PROBLEM — F99 PSYCHIATRIC DISORDER: Status: ACTIVE | Noted: 2018-02-12

## 2018-02-12 NOTE — PROGRESS NOTES
Per [facility] exam was of the left upper extremity and was negative. Tech that amends record is off until tomorrow and will amend then.

## 2018-02-12 NOTE — PROGRESS NOTES
Message left at [phone] with provider request for clarification of results.

## 2018-05-30 ENCOUNTER — OFFICE VISIT (OUTPATIENT)
Dept: SURGERY | Age: 48
End: 2018-05-30

## 2018-05-30 VITALS
BODY MASS INDEX: 47.95 KG/M2 | WEIGHT: 254 LBS | HEIGHT: 61 IN | HEART RATE: 103 BPM | SYSTOLIC BLOOD PRESSURE: 141 MMHG | DIASTOLIC BLOOD PRESSURE: 80 MMHG

## 2018-05-30 DIAGNOSIS — Z91.89 AT HIGH RISK FOR BREAST CANCER: Primary | ICD-10-CM

## 2018-05-30 DIAGNOSIS — N64.1 FAT NECROSIS OF FEMALE BREAST: ICD-10-CM

## 2018-05-30 NOTE — COMMUNICATION BODY
HISTORY OF PRESENT ILLNESS  Karma Fall is a 49 y.o. female. HPI  ESTABLISHED patient here for pain and hardness in her LEFT breast.   This is in the same location where her cancer was. Pain is off/on. Today it is 4/10. Completed XRT about a year ago, and she had no problems during her radiation therapy and tolerated this well. Did have a BioZorb put in at the time of her reduction lumpectomy.       10/13/16: LT lumpectomy with SNBx and RT reduction mammoplasty. LT breast: 3 cm, gr 3 IDC. 0/4 LN involved. Triple negative. Clear margins. pT2 pN0(sn) pMx. RT breast: Benign. 01/31/17: s/p adjuvant chemo (TAC x4) with Dr. Sheri Lopez, stopped due to peripheral neuropathy. 03/2017: started XRT (6/30 treatments) at [facility]. BILATERAL diagnostic mammogram 1/31/18, BIRADS 3, probably benign with calcifications thought to be evolving fat necrosis. LEFT diagnostic mammogram is already scheduled for 8/3/18. Past Medical History:   Diagnosis Date    Arthritis     Asthma     Cancer of left breast ([facility] [address] 08/01/2016    BREAST left lumpectomy 10/16 with b/l reduction, Adjuvant chemotherapy with TAC x 4,stopped due to peripheral neuropathy,Radiation 3/17 at [facility] Chronic pain     lower extremities    Diabetes ([facility] [address] 2012    Dyslipidemia     IBS (irritable bowel syndrome)     Keloid 08/01/2017    Excision of keloid scar approximately 4 cm -Jared Tuttle MD     Nausea & vomiting     Noncompliance     Obesity     Psychiatric disorder 02/12/2018    Normal MRI of the brain.     Radiation therapy complication 7416    left    S/P breast biopsy, left 8/1/16    Sinusitis     Stress at home        Past Surgical History:   Procedure Laterality Date    HX BREAST LUMPECTOMY Left 10/13/2016    LEFT BREAST REDUCTION LUMPECTOMY, LEFT SENTINEL NODE BIOPSY, LEFT BRACKETED NEEDLE LOCALIZATION, LEFT BREAST RECONSTRUCTION performed by Jared Tuttle MD at [address] [place] [address] Bilateral 10/13/2016    BREAST REDUCTION performed by Anastasia Herring MD at [address] HX BREAST REDUCTION Right 2016    HX  SECTION  [id number]    X2    HX GYN  2002    ABLATION    HX HERNIA REPAIR       umbilical as infant    HX HYSTERECTOMY      rosangela bso    HX ORTHOPAEDIC Left 2006    left dequervains    HX VASCULAR ACCESS Right 2016,2017    insertiona and removal of power port       Social History     Social History    Marital status: SINGLE     Spouse name: N/A    Number of children: N/A    Years of education: N/A     Occupational History    Not on file. Social History Main Topics    Smoking status: Never Smoker    Smokeless tobacco: Never Used    Alcohol use No    Drug use: No    Sexual activity: Yes     Partners: Male     Birth control/ protection: None     Other Topics Concern    Not on file     Social History Narrative         Medical History: Recurrent depressive d/o 2012Muscloskeletal back pain 2012Carpal tunnel syndrome 2012Asthma 1979Obesity 2012IBS (nima Hameed) 2012diabetes mellitus type 2 2013    Gyn History: Last mammogram date 2013. Surgical History:  x2, ROSANGELA 10/02    Hospitalization/Major Diagnostic Procedure: asthma childhood    Family History: Mother: alive 58 yrs CAD, HTN, depression Father: alive 61 yrs Brother(s): 40 yrs mental issues Son(s): alive 19,23 yrs 1:    bipolar and schizophrenia on SSI dariuss [id number] 1 brother(s) . 2 son(s) . Social History: Alcohol Use Patient does not use alcohol. Smoking Status Patient is a never smoker. Marital Status: Single. Lives w ith: alone    son. 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 Education/School: has highschool diploma, college.  Mandaeism: 5th Zoroastrian.       Current Outpatient Prescriptions on File Prior to Visit   Medication Sig Dispense Refill    FREESTYLE LANCETS 28 gauge misc USE TO TEST BLOOD SUGAR TID  11    Blood-Glucose Meter (FREESTYLE LITE METER) monitoring kit Use to test blood sugar three times daily. Dx.e119 1 Kit 0    glucose blood VI test strips (FREESTYLE LITE STRIPS) strip Use to test blood sugar three times daily. dx e11.9 100 Strip 11    Lancets misc Use to test blood sugar three times daily. dx.e11.9 100 Each 11    ONETOUCH ULTRA TEST strip TEST THREE TIMES DAILY 100 Strip 11    Liraglutide (VICTOZA) 0.6 mg/0.1 mL (18 mg/3 mL) pnij 1.8 mg by SubCUTAneous route daily. 4 Adjustable Dose Pre-filled Pen Syringe 11    KELLY PEN NEEDLE 32 gauge x 5/32\" ndle USE AS DIRECTED TWICE DAILY 100 Pen Needle 11    insulin lispro (HUMALOG) 100 unit/mL kwikpen 4 Units by SubCUTAneous route Before breakfast, lunch, and dinner. Or as directed 1 Package 11    insulin glargine (LANTUS,BASAGLAR) 100 unit/mL (3 mL) inpn 25 Units by SubCUTAneous route nightly. 5 Adjustable Dose Pre-filled Pen Syringe 11    dicyclomine (BENTYL) 10 mg capsule Take 20 mg by mouth daily as needed.  naproxen (NAPROSYN) 500 mg tablet TAKE 1 TABLET BY MOUTH TWICE DAILY WITH MEALS 60 Tab 6    buPROPion SR (WELLBUTRIN SR) 150 mg SR tablet TAKE 1 TABLET BY MOUTH TWICE DAILY 30 Tab 11    ONE TOUCH DELICA 33 gauge misc TEST THREE TIMES DAILY 100 Lancet 11    albuterol (PROVENTIL HFA, VENTOLIN HFA, PROAIR HFA) 90 mcg/actuation inhaler Take 2 Puffs by inhalation every four (4) hours as needed for Wheezing. 1 Inhaler 11    albuterol (PROVENTIL VENTOLIN) 2.5 mg /3 mL (0.083 %) nebulizer solution every four (4) hours as needed. No current facility-administered medications on file prior to visit. Allergies   Allergen Reactions    Atorvastatin Other (comments)     Joint pains in arms    Codeine Diarrhea    Pravastatin Myalgia     Terrible leg aching.  Theophylline Diarrhea       OB History     Obstetric Comments    Menarche: 7. LMP: 2006. # of Children: 2.   Age at Delivery of First Child: 22.   Hysterectomy/oophorectomy: yes/yes  Breast Bx: no.  Hx of Breast Feeding: no  BCP: yes [id number]. Hormone therapy: yes 2008-present. ROS    Physical Exam   Cardiovascular: Normal rate and normal heart sounds. Pulmonary/Chest: Breath sounds normal. Right breast exhibits no inverted nipple, no mass, no nipple discharge, no skin change and no tenderness. Left breast exhibits mass and tenderness. Left breast exhibits no inverted nipple, no nipple discharge and no skin change. Breasts are symmetrical.       Lymphadenopathy:        Right cervical: No superficial cervical, no deep cervical and no posterior cervical adenopathy present. Left cervical: No superficial cervical, no deep cervical and no posterior cervical adenopathy present. Right axillary: No pectoral and no lateral adenopathy present. Left axillary: No pectoral and no lateral adenopathy present. BREAST ULTRASOUND  Indication: Left breast \"knot\" and tenderness  Technique: The area was scanned using a high-frequency linear-array near-field transducer  Findings: Hypoechoic area with rim calcification corresponding to palpable abnormality  Impression: Probable evolving fat necrosis, with some atypical appearing areas  Disposition: No worrisome finding on ultrasound    ASSESSMENT and PLAN    ICD-10-CM ICD-9-CM    1. At high risk for breast cancer Z91.89 V49.89 MRI BREAST BI W WO CONT   2. 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 \" Pt has hx of LEFT IDC, treated with LEFT lumpectomy with SNBx and RIGHT reduction mammoplasty on 10/13/16. Tenderness at LEFT breast 7:00 near nipple on exam. US visualizes \"classic\" fat necrosis, with evolving atypical fat necrosis. Reviewed mammo, which shows calcifications at lumpectomy bed. However, as these areas do not look typical on US, I recommend f/u MRI. Will order this, and radiology will call pt to schedule. I will f/u with the results.  This plan was reviewed with the patient and patient agrees. All questions were answered.     Written by Giovanny Lucero, as dictated by Dr. Regla Martinez MD.

## 2018-05-30 NOTE — MR AVS SNAPSHOT
[address] [address] [address] 
[phone] Patient: Zachary Plummer MRN: [id number] Visit Information Date & Time Provider Department Dept. Phone Encounter #  
 2018  5:13 PM Joseph Aldana MD [address] at [facility] [id number] Upcoming Health Maintenance Date Due Pneumococcal 19-64 Highest Risk (1 of 3 - PCV13) 1988 DTaP/Tdap/Td series (1 - Tdap) 1990 EYE EXAM RETINAL OR DILATED Q1 2016 PAP AKA CERVICAL CYTOLOGY 2017 FOOT EXAM Q1 3/24/2018 LIPID PANEL Q1 3/24/2018 HEMOGLOBIN A1C Q6M 2018 Influenza Age 5 to Adult 2018 MICROALBUMIN Q1 2018 Allergies as of 2018  Review Complete On: 2018 By: Rosalba Mahmood RN Severity Noted Reaction Type Reactions Atorvastatin  2018    Other (comments) Joint pains in arms Codeine  2011    Diarrhea Pravastatin  2016    Myalgia Terrible leg aching. Theophylline  2011    Diarrhea Current Immunizations  Reviewed on 10/17/2017 Name Date Influenza Vaccine 2016 Influenza Vaccine (Quad) PF 2017 Not reviewed this visit Vitals BP Pulse Height(growth percentile) Weight(growth percentile) BMI OB Status 141/80 (!) 103 5' 1\" (1.549 m) 254 lb (115.2 kg) 47.99 kg/m2 Hysterectomy Smoking Status Never Smoker BMI and BSA Data Body Mass Index Body Surface Area  
 47.99 kg/m 2 2.23 m 2 Preferred Pharmacy Pharmacy Name Phone [facility] [address] [place] [address] 37 1500 [facility] [phone] Your Updated Medication List  
  
   
This list is accurate as of 18  3:26 PM.  Always use your most recent med list.  
  
  
  
  
 * albuterol 90 mcg/actuation inhaler Commonly known as:  PROVENTIL HFA, VENTOLIN HFA, PROAIR HFA Take 2 Puffs by inhalation every four (4) hours as needed for Wheezing. * albuterol 2.5 mg /3 mL (0.083 %) nebulizer solution Commonly known as:  PROVENTIL VENTOLIN  
every four (4) hours as needed. BENTYL 10 mg capsule Generic drug:  dicyclomine Take 20 mg by mouth daily as needed. Blood-Glucose Meter monitoring kit Commonly known as:  FREESTYLE LITE METER Use to test blood sugar three times daily. Dx.e119  
  
 buPROPion  mg SR tablet Commonly known as:  WELLBUTRIN SR  
TAKE 1 TABLET BY MOUTH TWICE DAILY  
  
 insulin glargine 100 unit/mL (3 mL) Inpn Commonly known as:  LANTUS,BASAGLAR  
25 Units by SubCUTAneous route nightly. insulin lispro 100 unit/mL kwikpen Commonly known as:  HUMALOG  
4 Units by SubCUTAneous route Before breakfast, lunch, and dinner. Or as directed Liraglutide 0.6 mg/0.1 mL (18 mg/3 mL) Pnij Commonly known as:  VICTOZA  
1.8 mg by SubCUTAneous route daily. Mary Pen Needle 32 gauge x 5/32\" Ndle Generic drug:  Insulin Needles (Disposable) USE AS DIRECTED TWICE DAILY  
  
 naproxen 500 mg tablet Commonly known as:  NAPROSYN  
TAKE 1 TABLET BY MOUTH TWICE DAILY WITH MEALS  
  
 * One Touch Delica 33 gauge Misc Generic drug:  lancets TEST THREE TIMES DAILY * Lancets Misc Use to test blood sugar three times daily. dx.e11.9 * FREESTYLE LANCETS 28 gauge Misc Generic drug:  lancets USE TO TEST BLOOD SUGAR TID * ONETOUCH ULTRA TEST strip Generic drug:  glucose blood VI test strips TEST THREE TIMES DAILY * glucose blood VI test strips strip Commonly known as:  FREESTYLE LITE STRIPS Use to test blood sugar three times daily. dx e11.9 * Notice: This list has 7 medication(s) that are the same as other medications prescribed for you. Read the directions carefully, and ask your doctor or other care provider to review them with you. To-Do List   
 08/03/2018 9:15 AM  
  Appointment with [facility] MARVA 5 at [address] ([phone]) Shower or bathe using soap and water. Do not use deodorant, powder, perfumes, or lotion the day of your exam.  If your prior mammograms were not performed at [facility] 6 please bring films with you or forward prior images 2 days before your procedure. If patient is not a callback diagnostic, the patient must have an order/script from the physician for the diagnostic. Please bring it on the day of the mammogram or have it faxed to the department. [facility]  [phone] [facility] [place]  [phone] [phone] [facility] [address] [facility] [phone] [facility] [phone] [facility]! Dear Amol Chapa: 
Thank you for requesting a Handup account. Our records indicate that you already have an active Handup account. You can access your account anytime at [email or web address] Did you know that you can access your hospital and ER discharge instructions at any time in Handup? You can also review all of your test results from your hospital stay or ER visit. Additional Information If you have questions, please visit the Frequently Asked Questions section of the Handup website at [email or web address] Remember, Handup is NOT to be used for urgent needs. For medical emergencies, dial 911. Now available from your iPhone and Android! Please provide this summary of care documentation to your next provider. Your primary care clinician is listed as Josue Lopez. If you have any questions after today's visit, please call [phone].

## 2018-05-30 NOTE — PROGRESS NOTES
HISTORY OF PRESENT ILLNESS  René Krishnan is a 49 y.o. female. HPI  ESTABLISHED patient here for pain and hardness in her LEFT breast.   This is in the same location where her cancer was. Pain is off/on. Today it is 4/10. Completed XRT about a year ago, and she had no problems during her radiation therapy and tolerated this well. Did have a BioZorb put in at the time of her reduction lumpectomy.       10/13/16: LT lumpectomy with SNBx and RT reduction mammoplasty. LT breast: 3 cm, gr 3 IDC. 0/4 LN involved. Triple negative. Clear margins. pT2 pN0(sn) pMx. RT breast: Benign. 01/31/17: s/p adjuvant chemo (TAC x4) with Dr. Gay Stevenson, stopped due to peripheral neuropathy. 03/2017: started XRT (6/30 treatments) at [facility]. BILATERAL diagnostic mammogram 1/31/18, BIRADS 3, probably benign with calcifications thought to be evolving fat necrosis. LEFT diagnostic mammogram is already scheduled for 8/3/18. Past Medical History:   Diagnosis Date    Arthritis     Asthma     Cancer of left breast ([facility] [address] 08/01/2016    BREAST left lumpectomy 10/16 with b/l reduction, Adjuvant chemotherapy with TAC x 4,stopped due to peripheral neuropathy,Radiation 3/17 at [facility] Chronic pain     lower extremities    Diabetes ([facility] [address] 2012    Dyslipidemia     IBS (irritable bowel syndrome)     Keloid 08/01/2017    Excision of keloid scar approximately 4 cm -Williams Swann MD     Nausea & vomiting     Noncompliance     Obesity     Psychiatric disorder 02/12/2018    Normal MRI of the brain.     Radiation therapy complication 8574    left    S/P breast biopsy, left 8/1/16    Sinusitis     Stress at home        Past Surgical History:   Procedure Laterality Date    HX BREAST LUMPECTOMY Left 10/13/2016    LEFT BREAST REDUCTION LUMPECTOMY, LEFT SENTINEL NODE BIOPSY, LEFT BRACKETED NEEDLE LOCALIZATION, LEFT BREAST RECONSTRUCTION performed by Williams Swann MD at [address] [place] [address] Bilateral 10/13/2016    BREAST REDUCTION performed by Monica Hooper MD at [address] HX BREAST REDUCTION Right 2016    HX  SECTION  [id number]    X2    HX GYN  2002    ABLATION    HX HERNIA REPAIR       umbilical as infant    HX HYSTERECTOMY      rosangela bso    HX ORTHOPAEDIC Left 2006    left dequervains    HX VASCULAR ACCESS Right 2016,2017    insertiona and removal of power port       Social History     Social History    Marital status: SINGLE     Spouse name: N/A    Number of children: N/A    Years of education: N/A     Occupational History    Not on file. Social History Main Topics    Smoking status: Never Smoker    Smokeless tobacco: Never Used    Alcohol use No    Drug use: No    Sexual activity: Yes     Partners: Male     Birth control/ protection: None     Other Topics Concern    Not on file     Social History Narrative         Medical History: Recurrent depressive d/o 2012Muscloskeletal back pain 2012Carpal tunnel syndrome 2012Asthma 1979Obesity 2012IBS (nima Garsia) 2012diabetes mellitus type 2 2013    Gyn History: Last mammogram date 2013. Surgical History:  x2, ROSANGELA 10/02    Hospitalization/Major Diagnostic Procedure: asthma childhood    Family History: Mother: alive 58 yrs CAD, HTN, depression Father: alive 61 yrs Brother(s): 40 yrs mental issues Son(s): alive 19,23 yrs 1:    bipolar and schizophrenia on SSI dariuss [id number] 1 brother(s) . 2 son(s) . Social History: Alcohol Use Patient does not use alcohol. Smoking Status Patient is a never smoker. Marital Status: Single. Lives w ith: alone    son. 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 Education/School: has highschool diploma, college.  [employer]: 5[employer].       Current Outpatient Prescriptions on File Prior to Visit   Medication Sig Dispense Refill    FREESTYLE LANCETS 28 gauge misc USE TO TEST BLOOD SUGAR TID  11    Blood-Glucose Meter (FREESTYLE LITE METER) monitoring kit Use to test blood sugar three times daily. Dx.e119 1 Kit 0    glucose blood VI test strips (FREESTYLE LITE STRIPS) strip Use to test blood sugar three times daily. dx e11.9 100 Strip 11    Lancets misc Use to test blood sugar three times daily. dx.e11.9 100 Each 11    ONETOUCH ULTRA TEST strip TEST THREE TIMES DAILY 100 Strip 11    Liraglutide (VICTOZA) 0.6 mg/0.1 mL (18 mg/3 mL) pnij 1.8 mg by SubCUTAneous route daily. 4 Adjustable Dose Pre-filled Pen Syringe 11    KELLY PEN NEEDLE 32 gauge x 5/32\" ndle USE AS DIRECTED TWICE DAILY 100 Pen Needle 11    insulin lispro (HUMALOG) 100 unit/mL kwikpen 4 Units by SubCUTAneous route Before breakfast, lunch, and dinner. Or as directed 1 Package 11    insulin glargine (LANTUS,BASAGLAR) 100 unit/mL (3 mL) inpn 25 Units by SubCUTAneous route nightly. 5 Adjustable Dose Pre-filled Pen Syringe 11    dicyclomine (BENTYL) 10 mg capsule Take 20 mg by mouth daily as needed.  naproxen (NAPROSYN) 500 mg tablet TAKE 1 TABLET BY MOUTH TWICE DAILY WITH MEALS 60 Tab 6    buPROPion SR (WELLBUTRIN SR) 150 mg SR tablet TAKE 1 TABLET BY MOUTH TWICE DAILY 30 Tab 11    ONE TOUCH DELICA 33 gauge misc TEST THREE TIMES DAILY 100 Lancet 11    albuterol (PROVENTIL HFA, VENTOLIN HFA, PROAIR HFA) 90 mcg/actuation inhaler Take 2 Puffs by inhalation every four (4) hours as needed for Wheezing. 1 Inhaler 11    albuterol (PROVENTIL VENTOLIN) 2.5 mg /3 mL (0.083 %) nebulizer solution every four (4) hours as needed. No current facility-administered medications on file prior to visit. Allergies   Allergen Reactions    Atorvastatin Other (comments)     Joint pains in arms    Codeine Diarrhea    Pravastatin Myalgia     Terrible leg aching.  Theophylline Diarrhea       OB History     Obstetric Comments    Menarche: 7. LMP: 2006. # of Children: 2.   Age at Delivery of First Child: 22.   Hysterectomy/oophorectomy: yes/yes  Breast Bx: no.  Hx of Breast Feeding: no  BCP: yes [id number]. Hormone therapy: yes 2008-present. ROS    Physical Exam   Cardiovascular: Normal rate and normal heart sounds. Pulmonary/Chest: Breath sounds normal. Right breast exhibits no inverted nipple, no mass, no nipple discharge, no skin change and no tenderness. Left breast exhibits mass and tenderness. Left breast exhibits no inverted nipple, no nipple discharge and no skin change. Breasts are symmetrical.       Lymphadenopathy:        Right cervical: No superficial cervical, no deep cervical and no posterior cervical adenopathy present. Left cervical: No superficial cervical, no deep cervical and no posterior cervical adenopathy present. Right axillary: No pectoral and no lateral adenopathy present. Left axillary: No pectoral and no lateral adenopathy present. BREAST ULTRASOUND  Indication: Left breast \"knot\" and tenderness  Technique: The area was scanned using a high-frequency linear-array near-field transducer  Findings: Hypoechoic area with rim calcification corresponding to palpable abnormality  Impression: Probable evolving fat necrosis, with some atypical appearing areas  Disposition: No worrisome finding on ultrasound    ASSESSMENT and PLAN    ICD-10-CM ICD-9-CM    1. At high risk for breast cancer Z91.89 V49.89 MRI BREAST BI W WO CONT   2. 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 \" Pt has hx of LEFT IDC, treated with LEFT lumpectomy with SNBx and RIGHT reduction mammoplasty on 10/13/16. Tenderness at LEFT breast 7:00 near nipple on exam. US visualizes \"classic\" fat necrosis, with evolving atypical fat necrosis. Reviewed mammo, which shows calcifications at lumpectomy bed. However, as these areas do not look typical on US, I recommend f/u MRI. Will order this, and radiology will call pt to schedule. I will f/u with the results.  This plan was reviewed with the patient and patient agrees. All questions were answered.     Written by Jitendra Christianson, as dictated by Dr. Julian Trejo MD.

## 2018-05-30 NOTE — PROGRESS NOTES
HISTORY OF PRESENT ILLNESS  Henry Mckeon is a 49 y.o. female. HPI    ESTABLISHED patient here for pain and hardness in her LEFT breast.   This is in the same location where her cancer was. Pain is off/on. Today it is 4/10. Completed XRT about a year ago, and she had no problems during her radiation therapy and tolerated this well. Did have a BioZorb put in at the time of her reduction lumpectomy. 10/13/16: LT lumpectomy with SNBx and RT reduction mammoplasty. LT breast: 3 cm, gr 3 IDC. 0/4 LN involved. Triple negative. Clear margins. pT2 pN0(sn) pMx. RT breast: Benign. 01/31/17: s/p adjuvant chemo (TAC x4) with Dr. Bebe Negro, stopped due to peripheral neuropathy. 03/2017: [place] [facility] BILATERAL diagnostic mammogram 1/31/18, BIRADS 3, probably benign with calcifications thought to be evolving fat necrosis. LEFT diagnostic mammogram is already scheduled for 8/3/18.         [facility]    Physical Exam    ASSESSMENT and PLAN  {ASSESSMENT/PLAN:[id number]}

## 2018-06-14 ENCOUNTER — HOSPITAL ENCOUNTER (OUTPATIENT)
Dept: MRI IMAGING | Age: 48
Discharge: HOME | End: 2018-06-14
Attending: SURGERY
Payer: COMMERCIAL

## 2018-06-14 DIAGNOSIS — Z91.89 AT HIGH RISK FOR BREAST CANCER: ICD-10-CM

## 2018-06-14 DIAGNOSIS — N64.1 FAT NECROSIS OF FEMALE BREAST: ICD-10-CM

## 2018-06-14 LAB — CREAT BLD-MCNC: 0.9 MG/DL (ref 0.6–1.3)

## 2018-06-14 PROCEDURE — A9585 GADOBUTROL INJECTION: HCPCS | Performed by: SURGERY

## 2018-06-14 PROCEDURE — 77059 MRI BREAST BI W WO CONT: CPT

## 2018-06-14 PROCEDURE — 74011250636 HC RX REV CODE- 250/636: Performed by: SURGERY

## 2018-06-14 PROCEDURE — 82565 ASSAY OF CREATININE: CPT

## 2018-06-14 RX ADMIN — GADOBUTROL 11 ML: 604.72 INJECTION INTRAVENOUS at 10:56

## 2018-06-19 ENCOUNTER — TELEPHONE (OUTPATIENT)
Dept: SURGERY | Age: 48
End: 2018-06-19

## 2018-06-19 DIAGNOSIS — R92.8 ABNORMAL MRI, BREAST: Primary | ICD-10-CM

## 2018-07-18 ENCOUNTER — TELEPHONE (OUTPATIENT)
Dept: SURGERY | Age: 48
End: 2018-07-18

## 2018-07-18 NOTE — TELEPHONE ENCOUNTER
Returned patient's call. She has not heard from Karen Mark [facility] about scheduling her second look right breast U/S and possible biopsy. Order was placed on 6/19/18. Interestingly, [facility] had communicated with me Garret Mark) that the patient was scheduled for 8/3/18 and stated that this is the soonest that the patient could arrange to be off work. Patient says that she can come anytime because she works nights. Not sure what happened here. Order in [address] [place] [address] is not linked with any appointment so I think there has been some mistake/confusion. I apologized to the patient. I gave her number for women's services for [facility] and I also told her I would reach out to Karen Mark) which I did and ask her to call her tomorrow to get an ASAP appointment for the patient. She was very understanding and appreciative of me reaching out to her today.

## 2018-07-20 ENCOUNTER — DOCUMENTATION ONLY (OUTPATIENT)
Dept: SURGERY | Age: 48
End: 2018-07-20

## 2018-07-20 DIAGNOSIS — R92.8 ABNORMAL MAMMOGRAM OF LEFT BREAST: ICD-10-CM

## 2018-07-20 DIAGNOSIS — R92.8 ABNORMAL MRI, BREAST: Primary | ICD-10-CM

## 2018-07-20 DIAGNOSIS — C50.912 MALIGNANT NEOPLASM OF LEFT FEMALE BREAST, UNSPECIFIED ESTROGEN RECEPTOR STATUS, UNSPECIFIED SITE OF BREAST (HCC): ICD-10-CM

## 2018-07-20 NOTE — PROGRESS NOTES
Noreen at [address] [place] [address] is arranging patient's imaging. She has held a spot for her on 8/1 or 8/2/18. She L/M for the patient to call back to confirm.

## 2018-08-03 ENCOUNTER — DOCUMENTATION ONLY (OUTPATIENT)
Dept: SURGERY | Age: 48
End: 2018-08-03

## 2018-08-03 NOTE — PROGRESS NOTES
Received a call from Jeannie Malcolm at [address] [place] [address]. Patient called to cancel her imaging and possible U/S bx today due to illness. I will check to see if she has rescheduled.

## 2018-08-17 ENCOUNTER — HOSPITAL ENCOUNTER (OUTPATIENT)
Dept: MAMMOGRAPHY | Age: 48
Discharge: HOME | End: 2018-08-17
Attending: SURGERY
Payer: COMMERCIAL

## 2018-08-17 DIAGNOSIS — R92.8 ABNORMAL MAMMOGRAM OF LEFT BREAST: ICD-10-CM

## 2018-08-17 DIAGNOSIS — C50.912 MALIGNANT NEOPLASM OF LEFT FEMALE BREAST, UNSPECIFIED ESTROGEN RECEPTOR STATUS, UNSPECIFIED SITE OF BREAST (HCC): ICD-10-CM

## 2018-08-17 DIAGNOSIS — N63.10 BREAST MASS, RIGHT: ICD-10-CM

## 2018-08-17 DIAGNOSIS — R92.8 ABNORMAL MRI, BREAST: ICD-10-CM

## 2018-08-17 PROCEDURE — 77066 DX MAMMO INCL CAD BI: CPT

## 2018-08-17 PROCEDURE — 76642 ULTRASOUND BREAST LIMITED: CPT

## 2018-08-17 PROCEDURE — 77065 DX MAMMO INCL CAD UNI: CPT

## 2018-09-25 ENCOUNTER — DOCUMENTATION ONLY (OUTPATIENT)
Dept: INTERNAL MEDICINE CLINIC | Age: 48
End: 2018-09-25

## 2018-09-29 RX ORDER — HYDROCORTISONE ACETATE 25 MG/1
25 SUPPOSITORY RECTAL EVERY 12 HOURS
Qty: 12 EACH | Refills: 11 | Status: SHIPPED | OUTPATIENT
Start: 2018-09-29 | End: 2020-12-17

## 2018-11-12 RX ORDER — INSULIN LISPRO 100 [IU]/ML
INJECTION, SOLUTION INTRAVENOUS; SUBCUTANEOUS
Qty: 15 ML | Refills: 0 | Status: SHIPPED | OUTPATIENT
Start: 2018-11-12 | End: 2019-12-06 | Stop reason: SDUPTHER

## 2018-11-20 RX ORDER — LIRAGLUTIDE 6 MG/ML
INJECTION SUBCUTANEOUS
Qty: 6 ML | Refills: 0 | Status: SHIPPED | OUTPATIENT
Start: 2018-11-20 | End: 2020-06-28 | Stop reason: SDUPTHER

## 2018-12-03 RX ORDER — PEN NEEDLE, DIABETIC 32GX 5/32"
NEEDLE, DISPOSABLE MISCELLANEOUS
Qty: 100 PEN NEEDLE | Refills: 11 | Status: SHIPPED | OUTPATIENT
Start: 2018-12-03 | End: 2020-11-12 | Stop reason: SDUPTHER

## 2019-01-08 RX ORDER — INSULIN GLARGINE 100 [IU]/ML
INJECTION, SOLUTION SUBCUTANEOUS
Qty: 15 ML | Refills: 0 | Status: SHIPPED | OUTPATIENT
Start: 2019-01-08 | End: 2019-09-25 | Stop reason: SDUPTHER

## 2019-01-11 ENCOUNTER — DOCUMENTATION ONLY (OUTPATIENT)
Dept: INTERNAL MEDICINE CLINIC | Age: 49
End: 2019-01-11

## 2019-01-19 RX ORDER — AZITHROMYCIN 250 MG/1
250 TABLET, FILM COATED ORAL SEE ADMIN INSTRUCTIONS
Qty: 6 TAB | Refills: 1 | Status: SHIPPED | OUTPATIENT
Start: 2019-01-19 | End: 2019-01-24

## 2019-01-25 ENCOUNTER — TELEPHONE (OUTPATIENT)
Dept: INTERNAL MEDICINE CLINIC | Age: 49
End: 2019-01-25

## 2019-01-25 NOTE — TELEPHONE ENCOUNTER
Patient sent a message that since she started the zpak she has had loose stools. Thinks patient allergic to zpak. She states she did purchase immodium a-d and is better. patient ask to call if need to be seen.

## 2019-03-15 RX ORDER — AZITHROMYCIN 250 MG/1
TABLET, FILM COATED ORAL
Qty: 6 TAB | Refills: 0 | Status: SHIPPED | OUTPATIENT
Start: 2019-03-15 | End: 2019-03-20

## 2019-09-11 ENCOUNTER — TELEPHONE (OUTPATIENT)
Dept: SURGERY | Age: 49
End: 2019-09-11

## 2019-09-11 NOTE — TELEPHONE ENCOUNTER
Returned patient's phone call regarding when to follow-up and whether or not she needs yearly mammograms or six monthly mammograms. I reviewed her chart with Dr. Wanda Hilton and then called her. I let her know that she is overdue to follow-up with our office and recommended that she call in the near future for follow-up with Jin Michael NP. I let her know that she is not due for a mammogram until next March. She gets these are [facility]. She still feels the hard area in her breast but it is unchanged. She is in agreement with the above plan and will call for an appointment in the near future.

## 2019-09-25 ENCOUNTER — OFFICE VISIT (OUTPATIENT)
Dept: INTERNAL MEDICINE CLINIC | Age: 49
End: 2019-09-25

## 2019-09-25 VITALS
HEIGHT: 61 IN | BODY MASS INDEX: 45.22 KG/M2 | TEMPERATURE: 98.3 F | HEART RATE: 111 BPM | WEIGHT: 239.5 LBS | OXYGEN SATURATION: 96 % | SYSTOLIC BLOOD PRESSURE: 126 MMHG | RESPIRATION RATE: 20 BRPM | DIASTOLIC BLOOD PRESSURE: 72 MMHG

## 2019-09-25 DIAGNOSIS — E78.5 DYSLIPIDEMIA: ICD-10-CM

## 2019-09-25 DIAGNOSIS — E66.01 OBESITY, MORBID (HCC): ICD-10-CM

## 2019-09-25 DIAGNOSIS — I10 ESSENTIAL HYPERTENSION: Primary | ICD-10-CM

## 2019-09-25 DIAGNOSIS — C50.912 CARCINOMA OF LEFT BREAST, STAGE 2, ESTROGEN RECEPTOR NEGATIVE (HCC): ICD-10-CM

## 2019-09-25 DIAGNOSIS — Z12.11 SCREEN FOR COLON CANCER: ICD-10-CM

## 2019-09-25 DIAGNOSIS — Z17.1 CARCINOMA OF LEFT BREAST, STAGE 2, ESTROGEN RECEPTOR NEGATIVE (HCC): ICD-10-CM

## 2019-09-25 DIAGNOSIS — E11.9 TYPE 2 DIABETES MELLITUS WITHOUT COMPLICATION, UNSPECIFIED WHETHER LONG TERM INSULIN USE (HCC): ICD-10-CM

## 2019-09-25 DIAGNOSIS — Z91.199 NONCOMPLIANCE: ICD-10-CM

## 2019-09-25 DIAGNOSIS — K58.9 IRRITABLE BOWEL SYNDROME WITHOUT DIARRHEA: ICD-10-CM

## 2019-09-25 PROBLEM — M72.2 PLANTAR FASCIITIS, RIGHT: Status: ACTIVE | Noted: 2019-09-25

## 2019-09-25 RX ORDER — PREDNISONE 20 MG/1
40 TABLET ORAL
Qty: 10 TAB | Refills: 1 | Status: SHIPPED | OUTPATIENT
Start: 2019-09-25 | End: 2019-10-20 | Stop reason: SDUPTHER

## 2019-09-25 RX ORDER — ALBUTEROL SULFATE 90 UG/1
2 AEROSOL, METERED RESPIRATORY (INHALATION)
Qty: 1 INHALER | Refills: 11 | Status: SHIPPED | OUTPATIENT
Start: 2019-09-25 | End: 2020-10-05 | Stop reason: SDUPTHER

## 2019-09-25 RX ORDER — ALBUTEROL SULFATE 90 UG/1
2 AEROSOL, METERED RESPIRATORY (INHALATION)
Qty: 1 INHALER | Refills: 11 | Status: SHIPPED | OUTPATIENT
Start: 2019-09-25 | End: 2020-10-05

## 2019-09-25 RX ORDER — CEFUROXIME AXETIL 250 MG/1
500 TABLET ORAL 2 TIMES DAILY
Qty: 14 TAB | Refills: 0 | Status: SHIPPED | OUTPATIENT
Start: 2019-09-25 | End: 2020-06-23

## 2019-09-25 RX ORDER — INSULIN GLARGINE 100 [IU]/ML
INJECTION, SOLUTION SUBCUTANEOUS
Qty: 15 ML | Refills: 11 | Status: SHIPPED | OUTPATIENT
Start: 2019-09-25 | End: 2019-12-06 | Stop reason: SDUPTHER

## 2019-09-25 RX ORDER — BUDESONIDE AND FORMOTEROL FUMARATE DIHYDRATE 160; 4.5 UG/1; UG/1
2 AEROSOL RESPIRATORY (INHALATION) 2 TIMES DAILY
Qty: 1 INHALER | Refills: 11 | Status: SHIPPED | OUTPATIENT
Start: 2019-09-25 | End: 2020-10-14

## 2019-09-25 NOTE — PROGRESS NOTES
1. Have you been to the ER, urgent care clinic since your last visit? Hospitalized since your last visit? No    2. Have you seen or consulted any other health care providers outside of the [address] [place] [address] since your last visit? Include any pap smears or colon screening.  No     Wants to discuss leg cramps and  incontinent

## 2019-09-25 NOTE — PATIENT INSTRUCTIONS
Body Mass Index: Care Instructions Your Care Instructions Body mass index (BMI) can help you see if your weight is raising your risk for health problems. It uses a formula to compare how much you weigh with how tall you are. · A BMI lower than 18.5 is considered underweight. · A BMI between 18.5 and 24.9 is considered healthy. · A BMI between 25 and 29.9 is considered overweight. A BMI of 30 or higher is considered obese. If your BMI is in the normal range, it means that you have a lower risk for weight-related health problems. If your BMI is in the overweight or obese range, you may be at increased risk for weight-related health problems, such as high blood pressure, heart disease, stroke, arthritis or joint pain, and diabetes. If your BMI is in the underweight range, you may be at increased risk for health problems such as fatigue, lower protection (immunity) against illness, muscle loss, bone loss, hair loss, and hormone problems. BMI is just one measure of your risk for weight-related health problems. You may be at higher risk for health problems if you are not active, you eat an unhealthy diet, or you drink too much alcohol or use tobacco products. Follow-up care is a key part of your treatment and safety. Be sure to make and go to all appointments, and call your doctor if you are having problems. It's also a good idea to know your test results and keep a list of the medicines you take. How can you care for yourself at home? · Practice healthy eating habits. This includes eating plenty of fruits, vegetables, whole grains, lean protein, and low-fat dairy. · If your doctor recommends it, get more exercise. Walking is a good choice. Bit by bit, increase the amount you walk every day. Try for at least 30 minutes on most days of the week. · Do not smoke. Smoking can increase your risk for health problems.  If you need help quitting, talk to your doctor about stop-smoking programs and medicines. These can increase your chances of quitting for good. · Limit alcohol to 2 drinks a day for men and 1 drink a day for women. Too much alcohol can cause health problems. If you have a BMI higher than 25 · Your doctor may do other tests to check your risk for weight-related health problems. This may include measuring the distance around your waist. A waist measurement of more than 40 inches in men or 35 inches in women can increase the risk of weight-related health problems. · Talk with your doctor about steps you can take to stay healthy or improve your health. You may need to make lifestyle changes to lose weight and stay healthy, such as changing your diet and getting regular exercise. If you have a BMI lower than 18.5 · Your doctor may do other tests to check your risk for health problems. · Talk with your doctor about steps you can take to stay healthy or improve your health. You may need to make lifestyle changes to gain or maintain weight and stay healthy, such as getting more healthy foods in your diet and doing exercises to build muscle. Where can you learn more? Go to http://rex-roro.info/. Enter S176 in the search box to learn more about \"Body Mass Index: Care Instructions. \" Current as of: October 13, 2016 Content Version: 11.4 © 8485-0382 [employer]. Care instructions adapted under license by [employer] (which disclaims liability or warranty for this information). If you have questions about a medical condition or this instruction, always ask your healthcare professional. [address] any warranty or liability for your use of this information.

## 2019-09-25 NOTE — PROGRESS NOTES
SPORTS MEDICINE [facility]ARE  Garcia Brannon MD, [address] [address]  Phone:  [phone]  Fax: [phone]       Chief Complaint   Patient presents with    Annual Exam   .      SUBJECTIVE:    Chris Burgess is a 47 y.o. female Patient returns today with known history of hypertension, diabetes, dyslipidemia, IBS, noncompliance, morbid obesity, breast cancer, and is seen for evaluation. Current Outpatient Medications   Medication Sig Dispense Refill    predniSONE (DELTASONE) 20 mg tablet Take 40 mg by mouth daily (with breakfast). 10 Tab 1    albuterol (PROVENTIL HFA, VENTOLIN HFA, PROAIR HFA) 90 mcg/actuation inhaler Take 2 Puffs by inhalation every four (4) hours as needed for Wheezing. 1 Inhaler 11    albuterol (PROVENTIL HFA, VENTOLIN HFA, PROAIR HFA) 90 mcg/actuation inhaler Take 2 Puffs by inhalation every four (4) hours as needed for Wheezing. 1 Inhaler 11    budesonide-formoterol (SYMBICORT) 160-4.5 mcg/actuation HFAA Take 2 Puffs by inhalation two (2) times a day. 1 Inhaler 11    cefUROXime (CEFTIN) 250 mg tablet Take 2 Tabs by mouth two (2) times a day. 14 Tab 0    insulin glargine (LANTUS SOLOSTAR U-100 INSULIN) 100 unit/mL (3 mL) inpn ADMINISTER 25 UNITS UNDER THE SKIN EVERY NIGHT 15 mL 11    sodium chloride (OCEAN) 0.65 % nasal squeeze bottle 0.1 mL by Both Nostrils route four (4) times daily. 45 mL 11    glucose blood VI test strips (FREESTYLE LITE STRIPS) strip USE TO TEST BLOOD SUGAR THREE TIMES DAILY 100 Strip 11    KELLY PEN NEEDLE 32 gauge x 5/32\" ndle USE AS DIRECTED TWICE DAILY 100 Pen Needle 11    VICTOZA 2-HUGO 0.6 mg/0.1 mL (18 mg/3 mL) pnij ADMINISTER 1.8 MG UNDER THE SKIN DAILY 6 mL 0    HUMALOG KWIKPEN INSULIN 100 unit/mL kwikpen INJECT 4 UNITS UNDER THE SKIN BEFORE BREAKFAST, LUNCH, AND DINNER OR AS DIRECTED 15 mL 0    hydrocortisone (ANUSOL-HC) 25 mg supp Insert 1 Suppository into rectum every twelve (12) hours.  12 Each 11    witch hazel-glycerin (TUCKS) 12.5-50 % pad 1 Pad by PeriANAL route as needed for Pain. 100 Pad 11    FREESTYLE LANCETS 28 gauge misc USE TO TEST BLOOD SUGAR TID  11    Blood-Glucose Meter (FREESTYLE LITE METER) monitoring kit Use to test blood sugar three times daily. Dx.[id number] 1 Kit 0    Lancets misc Use to test blood sugar three times daily. dx.e11.9 100 Each 11    ONETOUCH ULTRA TEST strip TEST THREE TIMES DAILY 100 Strip 11    dicyclomine (BENTYL) 10 mg capsule Take 20 mg by mouth daily as needed.  naproxen (NAPROSYN) 500 mg tablet TAKE 1 TABLET BY MOUTH TWICE DAILY WITH MEALS 60 Tab 6    buPROPion SR (WELLBUTRIN SR) 150 mg SR tablet TAKE 1 TABLET BY MOUTH TWICE DAILY 30 Tab 11    ONE TOUCH DELICA 33 gauge misc TEST THREE TIMES DAILY 100 Lancet 11    albuterol (PROVENTIL VENTOLIN) 2.5 mg /3 mL (0.083 %) nebulizer solution every four (4) hours as needed. Past Medical History:   Diagnosis Date    Arthritis     Asthma     [facility]/2016    BREAST left lumpectomy 10/16 with b/l reduction, Adjuvant chemotherapy with TAC x 4,stopped due to peripheral neuropathy,Radiation 3/17 at [facility] Chronic pain     lower extremities    Diabetes ([facility] 2012    Dyslipidemia     IBS (irritable bowel syndrome)     Keloid 08/01/2017    Excision of keloid scar approximately 4 cm -Derek Aaron MD     Nausea & vomiting     Noncompliance     Obesity     Plantar fasciitis, right 09/25/2019    Psychiatric disorder 02/12/2018    Normal MRI of the brain.     Radiation therapy complication 9943    left    S/P breast biopsy, left 8/1/16    Sinusitis     Stress at home      Past Surgical History:   Procedure Laterality Date    HX BREAST LUMPECTOMY Left 10/13/2016    LEFT BREAST REDUCTION LUMPECTOMY, LEFT SENTINEL NODE BIOPSY, LEFT BRACKETED NEEDLE LOCALIZATION, LEFT BREAST RECONSTRUCTION performed by Derek Aaron MD at [address] [place] [address] Bilateral 10/13/2016    BREAST REDUCTION performed by Gunnar Randall MD at [address] HX BREAST REDUCTION Bilateral 2016    HX  SECTION  1976.1338    X2    HX GYN  2002    ABLATION    HX HERNIA REPAIR       umbilical as infant    HX HYSTERECTOMY  2008    rosangela bso    HX ORTHOPAEDIC Left 2006    left dequervains    HX VASCULAR ACCESS Right 2016,2017    insertiona and removal of power port     Allergies   Allergen Reactions    Atorvastatin Other (comments)     Joint pains in arms    Codeine Diarrhea    Pravastatin Myalgia     Terrible leg aching.      Theophylline Diarrhea         REVIEW OF SYSTEMS:  General: negative for - chills or fever  ENT: negative for - headaches, nasal congestion or tinnitus  Respiratory: negative for - cough, hemoptysis, shortness of breath or wheezing  Cardiovascular : negative for - chest pain, edema, palpitations or shortness of breath  Gastrointestinal: negative for - abdominal pain, blood in stools, heartburn or nausea/vomiting  Genito-Urinary: no dysuria, trouble voiding, or hematuria  Musculoskeletal: negative for - gait disturbance, joint pain, joint stiffness or joint swelling  Neurological: no TIA or stroke symptoms  Hematologic: no bruises, no bleeding, no swollen glands  Integument: no lumps, mole changes, nail changes or rash  Endocrine: no malaise/lethargy or unexpected weight changes      Social History     Socioeconomic History    Marital status: SINGLE     Spouse name: Not on file    Number of children: Not on file    Years of education: Not on file    Highest education level: Not on file   Tobacco Use    Smoking status: Never Smoker    Smokeless tobacco: Never Used   Substance and Sexual Activity    Alcohol use: No    Drug use: No    Sexual activity: Yes     Partners: Male     Birth control/protection: None   Social History Narrative         Medical History: Recurrent depressive d/o 2012Muscloskeletal back pain 2012Carpal tunnel syndrome 2012Asthma 1979Obesity 2012IBS (nima Guzmán) 2012diabetes mellitus type 2 2013    Gyn History: Last mammogram date 2013. Surgical History:  x2, RA 10/02    Hospitalization/Major Diagnostic Procedure: asthma childhood    Family History: Mother: alive 58 yrs CAD, HTN, depression Father: alive 61 yrs Brother(s): 40 yrs mental issues Son(s): alive 19,23 yrs 1:    bipolar and schizophrenia on SSI kelley [id number] 1 brother(s) . 2 son(s) . Social History: Alcohol Use Patient does not use alcohol. Smoking Status Patient is a never smoker. Marital Status: Single. Lives w ith: alone    son. Occupation/W ork: employed full time [employer] at [employer]. Education/School: has highschool diploma, college. Worship: [employer].     Family History   Problem Relation Age of Onset    Heart Disease Mother     Hypertension Mother     Heart Disease Father     Seizures Sister     Anesth Problems Neg Hx        OBJECTIVE:    Visit Vitals  /72   Pulse (!) 111   Temp 98.3 °F (36.8 °C) (Oral)   Resp 20   Ht 5' 1\" (1.549 m)   Wt 239 lb 8 oz (108.6 kg)   SpO2 96%   BMI 45.25 kg/m²     CONSTITUTIONAL: well , well nourished, appears age appropriate  EYES: perrla, eom intact  ENMT:moist mucous membranes, pharynx clear  NECK: supple. Thyroid normal  RESPIRATORY: Chest: clear bilaterally   CARDIOVASCULAR: Heart: regular rate and rhythm  GASTROINTESTINAL: Abdomen: soft, bowel sounds active  HEMATOLOGIC: no pathological lymph nodes palpated  MUSCULOSKELETAL: Extremities: no edema, pulse 1+ Foot exam reveals no lesions. Sensation is diminished, particularly on the left, with fine filament. Pulses are intact. INTEGUMENT: No unusual rashes or suspicious skin lesions noted. Nails appear normal.  NEUROLOGIC: non-focal exam   MENTAL STATUS: alert and oriented, appropriate affect           ASSESSMENT:  1. Essential hypertension    2.  Type 2 diabetes mellitus without complication, unspecified whether long term insulin use ([facility]    3. Dyslipidemia    4. Irritable bowel syndrome without diarrhea    5. Noncompliance    6. Obesity, morbid ([facility]    7. Carcinoma of left breast, stage 2, estrogen receptor negative [facility]    8. Screen for colon cancer      Patient's BP control is at goal, no adjustments are made. Blood sugar control is atrocious and as usual she is out of her Lantus. She is completely noncompliant and she admits to being noncompliant, but unfortunately I think the uncontrolled diabetes is catching up with her with not only the blood sugars, but now developing a progressive neuropathy. We will check the cholesterol levels and make an adjustment in the statin depending on results. Obesity remains an issue and we encourage physical activity 30 minutes five days a week and a heart healthy diet. It is time for a colonoscopy. She agrees to proceed. She will be back to see us in three months. We will send her a letter about her results. We would like her to bring her blood sugar readings on her next visit and we will titrate her insulin to induce a more acceptable response. I have discussed the diagnosis with the patient and the intended plan as seen in the  orders above. The patient understands and agees with the plan. The patient has   received an after visit summary and questions were answered concerning  future plans  Patient labs and/or xrays were reviewed  Past records were reviewed.     PLAN:  .  Orders Placed This Encounter    HEMOGLOBIN A1C WITH EAG    LIPID PANEL    MICROALBUMIN, UR, 24 HR    MICROALBUMIN, UR, RAND W/ MICROALB/CREAT RATIO    REFERRAL FOR COLONOSCOPY    predniSONE (DELTASONE) 20 mg tablet    albuterol (PROVENTIL HFA, VENTOLIN HFA, PROAIR HFA) 90 mcg/actuation inhaler    albuterol (PROVENTIL HFA, VENTOLIN HFA, PROAIR HFA) 90 mcg/actuation inhaler    budesonide-formoterol (SYMBICORT) 160-4.5 mcg/actuation HFAA    cefUROXime (CEFTIN) 250 mg tablet    insulin glargine (LANTUS SOLOSTAR U-100 INSULIN) 100 unit/mL (3 mL) inpn       Follow-up and Dispositions    · Return in about 3 months (around 12/25/2019). ATTENTION:   This medical record was transcribed using an electronic medical records system. Although proofread, it may and can contain electronic and spelling errors. Other human spelling and other errors may be present. Corrections may be executed at a later time. Please feel free to contact us for any clarifications as needed. Discussed the patient's BMI with her. The BMI follow up plan is as follows:     dietary management education, guidance, and counseling  encourage exercise  monitor weight  prescribed dietary intake    An After Visit Summary was printed and given to the patient.

## 2019-09-26 LAB
ALBUMIN/CREAT UR: <6.7 MG/G CREAT (ref 0–30)
CHOLEST SERPL-MCNC: 230 MG/DL (ref 100–199)
CREAT UR-MCNC: 45.1 MG/DL
EST. AVERAGE GLUCOSE BLD GHB EST-MCNC: 315 MG/DL
HBA1C MFR BLD: 12.6 % (ref 4.8–5.6)
HDLC SERPL-MCNC: 44 MG/DL
LDLC SERPL CALC-MCNC: 138 MG/DL (ref 0–99)
MICROALBUMIN UR-MCNC: <3 UG/ML
TRIGL SERPL-MCNC: 240 MG/DL (ref 0–149)
VLDLC SERPL CALC-MCNC: 48 MG/DL (ref 5–40)

## 2019-10-17 ENCOUNTER — OFFICE VISIT (OUTPATIENT)
Dept: INTERNAL MEDICINE CLINIC | Age: 49
End: 2019-10-17

## 2019-10-17 VITALS
TEMPERATURE: 98.3 F | HEIGHT: 61 IN | BODY MASS INDEX: 45.82 KG/M2 | SYSTOLIC BLOOD PRESSURE: 120 MMHG | OXYGEN SATURATION: 95 % | WEIGHT: 242.7 LBS | DIASTOLIC BLOOD PRESSURE: 76 MMHG | RESPIRATION RATE: 20 BRPM | HEART RATE: 95 BPM

## 2019-10-17 DIAGNOSIS — Z91.199 NONCOMPLIANCE: ICD-10-CM

## 2019-10-17 DIAGNOSIS — Z23 ENCOUNTER FOR IMMUNIZATION: ICD-10-CM

## 2019-10-17 DIAGNOSIS — E11.9 TYPE 2 DIABETES MELLITUS WITHOUT COMPLICATION, UNSPECIFIED WHETHER LONG TERM INSULIN USE (HCC): ICD-10-CM

## 2019-10-17 DIAGNOSIS — K58.9 IRRITABLE BOWEL SYNDROME WITHOUT DIARRHEA: ICD-10-CM

## 2019-10-17 DIAGNOSIS — C50.912 CARCINOMA OF LEFT BREAST, STAGE 2, ESTROGEN RECEPTOR NEGATIVE (HCC): ICD-10-CM

## 2019-10-17 DIAGNOSIS — E66.01 OBESITY, MORBID (HCC): ICD-10-CM

## 2019-10-17 DIAGNOSIS — Z17.1 CARCINOMA OF LEFT BREAST, STAGE 2, ESTROGEN RECEPTOR NEGATIVE (HCC): ICD-10-CM

## 2019-10-17 DIAGNOSIS — J45.909 UNCOMPLICATED ASTHMA, UNSPECIFIED ASTHMA SEVERITY, UNSPECIFIED WHETHER PERSISTENT: ICD-10-CM

## 2019-10-17 DIAGNOSIS — I10 ESSENTIAL HYPERTENSION: Primary | ICD-10-CM

## 2019-10-17 DIAGNOSIS — E78.5 DYSLIPIDEMIA: ICD-10-CM

## 2019-10-17 DIAGNOSIS — R10.84 GENERALIZED ABDOMINAL PAIN: ICD-10-CM

## 2019-10-17 NOTE — PROGRESS NOTES
1. Have you been to the ER, urgent care clinic since your last visit? Hospitalized since your last visit? Yes When: 10-8-19 Reason for visit: vomiting and diarrhea    2. Have you seen or consulted any other health care providers outside of the [address] [place] [address] since your last visit? Include any pap smears or colon screening.  Yes Where: [facility]    ED follow up

## 2019-10-17 NOTE — PATIENT INSTRUCTIONS
Learning About Type 2 Diabetes  What is type 2 diabetes? Insulin is a hormone that helps your body use sugar from your food as energy. Type 2 diabetes happens when your body can't use insulin the right way. Over time, the pancreas can't make enough insulin. If you don't have enough insulin, too much sugar stays in your blood. If you are overweight, get little or no exercise, or have type 2 diabetes in your family, you are more likely to have problems with the way insulin works in your body.  Americans, Hispanics, Native Americans,  Americans, and Pacific Islanders have a higher risk for type 2 diabetes. Type 2 diabetes can be prevented or delayed with a healthy lifestyle, which includes staying at a healthy weight, making smart food choices, and getting regular exercise. What can you expect with type 2 diabetes? Mg Navarro keep hearing about how important it is to keep your blood sugar within a target range. That's because over time, high blood sugar can lead to serious problems. It can:  · Harm your eyes, nerves, and kidneys. · Damage your blood vessels, leading to heart disease and stroke. · Reduce blood flow and cause nerve damage to parts of your body, especially your feet. This can cause slow healing and pain when you walk. · Make your immune system weak and less able to fight infections. When people hear the word \"diabetes,\" they often think of problems like these. But daily care and treatment can help prevent or delay these problems. The goal is to keep your blood sugar in a target range. That's the best way to reduce your chance of having more problems from diabetes. What are the symptoms? Some people who have type 2 diabetes may not have any symptoms early on. Many people with the disease don't even know they have it at first. But with time, diabetes starts to cause symptoms. You experience most symptoms of type 2 diabetes when your blood sugar is either too high or too low.   The most common symptoms of high blood sugar include:  · Thirst.  · Frequent urination. · Weight loss. · Blurry vision. The symptoms of low blood sugar include:  · Sweating. · Shakiness. · Weakness. · Hunger. · Confusion. How can you prevent type 2 diabetes? The best way to prevent or delay type 2 diabetes is to adopt healthy habits, which include:  · Staying at a healthy weight. · Exercising regularly. · Eating healthy foods. How is type 2 diabetes treated? If you have type 2 diabetes, here are the most important things you can do. · Take your diabetes medicines. · Check your blood sugar as often as your doctor recommends. Also, get a hemoglobin A1c test at least every 6 months. · Try to eat a variety of foods and to spread carbohydrate throughout the day. Carbohydrate raises blood sugar higher and more quickly than any other nutrient does. Carbohydrate is found in sugar, breads and cereals, fruit, starchy vegetables such as potatoes and corn, and milk and yogurt. · Get at least 30 minutes of exercise on most days of the week. Walking is a good choice. You also may want to do other activities, such as running, swimming, cycling, or playing tennis or team sports. If your doctor says it's okay, do muscle-strengthening exercises at least 2 times a week. · See your doctor for checkups and tests on a regular schedule. · If you have high blood pressure or high cholesterol, take the medicines as prescribed by your doctor. · Do not smoke. Smoking can make health problems worse. This includes problems you might have with type 2 diabetes. If you need help quitting, talk to your doctor about stop-smoking programs and medicines. These can increase your chances of quitting for good. Follow-up care is a key part of your treatment and safety. Be sure to make and go to all appointments, and call your doctor if you are having problems.  It's also a good idea to know your test results and keep a list of the medicines you take. Where can you learn more? Go to http://rex-roro.info/. Enter J262 in the search box to learn more about \"Learning About Type 2 Diabetes. \"  Current as of: April 16, 2019  Content Version: 12.2  © 3517-1645 [employer]. Care instructions adapted under license by [employer] (which disclaims liability or warranty for this information). If you have questions about a medical condition or this instruction, always ask your healthcare professional. [address] any warranty or liability for your use of this information. INITIATE SLIDING SCALE INSULIN (IVIS):  RX IVIS Normal Sensitivity (Average weight)  For Blood Sugar (mg/dl) of:              111-150=0 units 251-300=6 units   151-200=2 units 301-350=8 units   201-250=4 units         351-400=10 units  401-450=12 units                   >451=14 and repeat bs in 2' and follow ssi  Hypoglycemia treatment          Learning About Type 2 Diabetes  What is type 2 diabetes? Insulin is a hormone that helps your body use sugar from your food as energy. Type 2 diabetes happens when your body can't use insulin the right way. Over time, the pancreas can't make enough insulin. If you don't have enough insulin, too much sugar stays in your blood. If you are overweight, get little or no exercise, or have type 2 diabetes in your family, you are more likely to have problems with the way insulin works in your body.  Americans, Hispanics, Native Americans,  Americans, and Pacific Islanders have a higher risk for type 2 diabetes. Type 2 diabetes can be prevented or delayed with a healthy lifestyle, which includes staying at a healthy weight, making smart food choices, and getting regular exercise. What can you expect with type 2 diabetes? Jacqualine Severin keep hearing about how important it is to keep your blood sugar within a target range.  That's because over time, high blood sugar can lead to serious problems. It can:  · Harm your eyes, nerves, and kidneys. · Damage your blood vessels, leading to heart disease and stroke. · Reduce blood flow and cause nerve damage to parts of your body, especially your feet. This can cause slow healing and pain when you walk. · Make your immune system weak and less able to fight infections. When people hear the word \"diabetes,\" they often think of problems like these. But daily care and treatment can help prevent or delay these problems. The goal is to keep your blood sugar in a target range. That's the best way to reduce your chance of having more problems from diabetes. What are the symptoms? Some people who have type 2 diabetes may not have any symptoms early on. Many people with the disease don't even know they have it at first. But with time, diabetes starts to cause symptoms. You experience most symptoms of type 2 diabetes when your blood sugar is either too high or too low. The most common symptoms of high blood sugar include:  · Thirst.  · Frequent urination. · Weight loss. · Blurry vision. The symptoms of low blood sugar include:  · Sweating. · Shakiness. · Weakness. · Hunger. · Confusion. How can you prevent type 2 diabetes? The best way to prevent or delay type 2 diabetes is to adopt healthy habits, which include:  · Staying at a healthy weight. · Exercising regularly. · Eating healthy foods. How is type 2 diabetes treated? If you have type 2 diabetes, here are the most important things you can do. · Take your diabetes medicines. · Check your blood sugar as often as your doctor recommends. Also, get a hemoglobin A1c test at least every 6 months. · Try to eat a variety of foods and to spread carbohydrate throughout the day. Carbohydrate raises blood sugar higher and more quickly than any other nutrient does. Carbohydrate is found in sugar, breads and cereals, fruit, starchy vegetables such as potatoes and corn, and milk and yogurt.   · Get at least 30 minutes of exercise on most days of the week. Walking is a good choice. You also may want to do other activities, such as running, swimming, cycling, or playing tennis or team sports. If your doctor says it's okay, do muscle-strengthening exercises at least 2 times a week. · See your doctor for checkups and tests on a regular schedule. · If you have high blood pressure or high cholesterol, take the medicines as prescribed by your doctor. · Do not smoke. Smoking can make health problems worse. This includes problems you might have with type 2 diabetes. If you need help quitting, talk to your doctor about stop-smoking programs and medicines. These can increase your chances of quitting for good. Follow-up care is a key part of your treatment and safety. Be sure to make and go to all appointments, and call your doctor if you are having problems. It's also a good idea to know your test results and keep a list of the medicines you take. Where can you learn more? Go to http://rex-roro.info/. Enter Q733 in the search box to learn more about \"Learning About Type 2 Diabetes. \"  Current as of: April 16, 2019  Content Version: 12.2  © 7445-6233 [employer]. Care instructions adapted under license by [employer] (which disclaims liability or warranty for this information). If you have questions about a medical condition or this instruction, always ask your healthcare professional. [place] [address] any warranty or liability for your use of this information. Learning About Type 2 Diabetes  What is type 2 diabetes? Insulin is a hormone that helps your body use sugar from your food as energy. Type 2 diabetes happens when your body can't use insulin the right way. Over time, the pancreas can't make enough insulin. If you don't have enough insulin, too much sugar stays in your blood.   If you are overweight, get little or no exercise, or have type 2 diabetes in your family, you are more likely to have problems with the way insulin works in your body.  Americans, Hispanics, Native Americans,  Americans, and Pacific Islanders have a higher risk for type 2 diabetes. Type 2 diabetes can be prevented or delayed with a healthy lifestyle, which includes staying at a healthy weight, making smart food choices, and getting regular exercise. What can you expect with type 2 diabetes? Aiden Boyd keep hearing about how important it is to keep your blood sugar within a target range. That's because over time, high blood sugar can lead to serious problems. It can:  · Harm your eyes, nerves, and kidneys. · Damage your blood vessels, leading to heart disease and stroke. · Reduce blood flow and cause nerve damage to parts of your body, especially your feet. This can cause slow healing and pain when you walk. · Make your immune system weak and less able to fight infections. When people hear the word \"diabetes,\" they often think of problems like these. But daily care and treatment can help prevent or delay these problems. The goal is to keep your blood sugar in a target range. That's the best way to reduce your chance of having more problems from diabetes. What are the symptoms? Some people who have type 2 diabetes may not have any symptoms early on. Many people with the disease don't even know they have it at first. But with time, diabetes starts to cause symptoms. You experience most symptoms of type 2 diabetes when your blood sugar is either too high or too low. The most common symptoms of high blood sugar include:  · Thirst.  · Frequent urination. · Weight loss. · Blurry vision. The symptoms of low blood sugar include:  · Sweating. · Shakiness. · Weakness. · Hunger. · Confusion. How can you prevent type 2 diabetes? The best way to prevent or delay type 2 diabetes is to adopt healthy habits, which include:  · Staying at a healthy weight.   · Exercising regularly. · Eating healthy foods. How is type 2 diabetes treated? If you have type 2 diabetes, here are the most important things you can do. · Take your diabetes medicines. · Check your blood sugar as often as your doctor recommends. Also, get a hemoglobin A1c test at least every 6 months. · Try to eat a variety of foods and to spread carbohydrate throughout the day. Carbohydrate raises blood sugar higher and more quickly than any other nutrient does. Carbohydrate is found in sugar, breads and cereals, fruit, starchy vegetables such as potatoes and corn, and milk and yogurt. · Get at least 30 minutes of exercise on most days of the week. Walking is a good choice. You also may want to do other activities, such as running, swimming, cycling, or playing tennis or team sports. If your doctor says it's okay, do muscle-strengthening exercises at least 2 times a week. · See your doctor for checkups and tests on a regular schedule. · If you have high blood pressure or high cholesterol, take the medicines as prescribed by your doctor. · Do not smoke. Smoking can make health problems worse. This includes problems you might have with type 2 diabetes. If you need help quitting, talk to your doctor about stop-smoking programs and medicines. These can increase your chances of quitting for good. Follow-up care is a key part of your treatment and safety. Be sure to make and go to all appointments, and call your doctor if you are having problems. It's also a good idea to know your test results and keep a list of the medicines you take. Where can you learn more? Go to http://rex-roro.info/. Enter Z655 in the search box to learn more about \"Learning About Type 2 Diabetes. \"  Current as of: April 16, 2019  Content Version: 12.2  © 5609-8570 [employer]. Care instructions adapted under license by [employer] (which disclaims liability or warranty for this information).  If you have questions about a medical condition or this instruction, always ask your healthcare professional. [place] [address] any warranty or liability for your use of this information. INITIATE SLIDING SCALE INSULIN (IVIS):  RX IVIS Normal Sensitivity (Average weight)  For Blood Sugar (mg/dl) of:              111-150=0 units 251-300=6 units   151-200=2 units 301-350=8 units   201-250=4 units         351-400=10 units  401-450=12 units                   >451=14 and repeat bs in 2' and follow ssi    Vaccine Information Statement    Influenza (Flu) Vaccine (Inactivated or Recombinant): What You Need to Know    Many Vaccine Information Statements are available in [place]ic and other languages. See www.immunize.org/vis  Hojas de información sobre vacunas están disponibles en español y en muchos otros idiomas. Visite www.immunize.org/vis    1. Why get vaccinated? Influenza vaccine can prevent influenza (flu). Flu is a contagious disease that spreads around the [facility] every year, usually between October and May. Anyone can get the flu, but it is more dangerous for some people. Infants and young children, people 72years of age and older, pregnant women, and people with certain health conditions or a weakened immune system are at greatest risk of flu complications. Pneumonia, bronchitis, sinus infections and ear infections are examples of flu-related complications. If you have a medical condition, such as heart disease, cancer or diabetes, flu can make it worse. Flu can cause fever and chills, sore throat, muscle aches, fatigue, cough, headache, and runny or stuffy nose. Some people may have vomiting and diarrhea, though this is more common in children than adults. Each year thousands of people in the [facility] die from flu, and many more are hospitalized. Flu vaccine prevents millions of illnesses and flu-related visits to the doctor each year.     2. Influenza vaccines     CDC recommends everyone 10months of age and older get vaccinated every flu season. Children 6 months through 6years of age may need 2 doses during a single flu season. Everyone else needs only 1 dose each flu season. It takes about 2 weeks for protection to develop after vaccination. There are many flu viruses, and they are always changing. Each year a new flu vaccine is made to protect against three or four viruses that are likely to cause disease in the upcoming flu season. Even when the vaccine doesnt exactly match these viruses, it may still provide some protection. Influenza vaccine does not cause flu. Influenza vaccine may be given at the same time as other vaccines. 3. Talk with your health care provider    Tell your vaccine provider if the person getting the vaccine:   Has had an allergic reaction after a previous dose of influenza vaccine, or has any severe, life-threatening allergies.  Has ever had Guillain-Barré Syndrome (also called GBS). In some cases, your health care provider may decide to postpone influenza vaccination to a future visit. People with minor illnesses, such as a cold, may be vaccinated. People who are moderately or severely ill should usually wait until they recover before getting influenza vaccine. Your health care provider can give you more information. 4. Risks of a reaction     Soreness, redness, and swelling where shot is given, fever, muscle aches, and headache can happen after influenza vaccine.  There may be a very small increased risk of Guillain-Barré Syndrome (GBS) after inactivated influenza vaccine (the flu shot). [facility] children who get the flu shot along with pneumococcal vaccine (PCV13), and/or DTaP vaccine at the same time might be slightly more likely to have a seizure caused by fever. Tell your health care provider if a child who is getting flu vaccine has ever had a seizure.     People sometimes faint after medical procedures, including vaccination. Tell your provider if you feel dizzy or have vision changes or ringing in the ears. As with any medicine, there is a very remote chance of a vaccine causing a severe allergic reaction, other serious injury, or death. 5. What if there is a serious problem? An allergic reaction could occur after the vaccinated person leaves the clinic. If you see signs of a severe allergic reaction (hives, swelling of the face and throat, difficulty breathing, a fast heartbeat, dizziness, or weakness), call 9-1-1 and get the person to the nearest hospital.    For other signs that concern you, call your health care provider. Adverse reactions should be reported to the Vaccine Adverse Event Reporting System (VAERS). Your health care provider will usually file this report, or you can do it yourself. Visit the VAERS website at www.vaers. hhs.gov or call 1-940.907.2681. VAERS is only for reporting reactions, and VAERS staff do not give medical advice. 6. The National Vaccine Injury Compensation Program    The [facility] Vaccine Injury Compensation Program (VICP) is a federal program that was created to compensate people who may have been injured by certain vaccines. Visit the VICP website at www.hrsa.gov/vaccinecompensation or call 4-645.108.8578 to learn about the program and about filing a claim. There is a time limit to file a claim for compensation. 7. How can I learn more?  Ask your health care provider.  Call your local or state health department.  Contact the Centers for Disease Control and Prevention (CDC):  - Call 0-119.549.7856 (1-800-CDC-INFO) or  - Visit CDCs influenza website at www.cdc.gov/flu    Vaccine Information Statement (Interim)  Inactivated Influenza Vaccine   8/15/2019  [address] [id number]   Department of Health and Human Services  Centers for Disease Control and Prevention    Office Use Only

## 2019-10-17 NOTE — PROGRESS NOTES
SPORTS MEDICINE AND PRIMARY CARE  Dhruv Hall MD, [address] [facility],[address] [address]  Phone:  [phone]  Fax: [phone]       Chief Complaint   Patient presents with    Diabetes     f/u   [facility] ED Follow-up   . SUBJECTIVE:    Flory Carver is a 47 y.o. female Patient returns today with known history of primary hypertension, stage II carcinoma, ER negative, left breast, bronchial asthma, obesity, noncompliance, IBS, dyslipidemia, uncontrolled diabetes with hemoglobin A1c greater than 12, and is seen for evaluation. Since we last saw her, she had an emergency room visit. Patient returns today after a visit to [address] [place] [address] emergency room, where she developed nausea, vomiting and diarrhea of such magnitude that she was found to have dehydration and given IV fluids. She was there around the first of the month. She is better now and comes in for follow up. Unfortunately she continues to have diarrhea. It is explosive diarrhea. She develops pain in the lower epigastric area and then has an episode of diarrhea after eating that then relives the pain. She has a colonoscopy next week with a doctor at [facility]. She is seen for evaluation. Current Outpatient Medications   Medication Sig Dispense Refill    pitavastatin calcium (LIVALO) 1 mg tab tablet Take 1 Tab by mouth daily. 30 Tab 11    albuterol (PROVENTIL HFA, VENTOLIN HFA, PROAIR HFA) 90 mcg/actuation inhaler Take 2 Puffs by inhalation every four (4) hours as needed for Wheezing. 1 Inhaler 11    albuterol (PROVENTIL HFA, VENTOLIN HFA, PROAIR HFA) 90 mcg/actuation inhaler Take 2 Puffs by inhalation every four (4) hours as needed for Wheezing. 1 Inhaler 11    budesonide-formoterol (SYMBICORT) 160-4.5 mcg/actuation HFAA Take 2 Puffs by inhalation two (2) times a day.  1 Inhaler 11    insulin glargine (LANTUS SOLOSTAR U-100 INSULIN) 100 unit/mL (3 mL) inpn ADMINISTER 25 UNITS UNDER THE SKIN EVERY NIGHT 15 mL 11    sodium chloride (OCEAN) 0.65 % nasal squeeze bottle 0.1 mL by Both Nostrils route four (4) times daily. 45 mL 11    glucose blood VI test strips (FREESTYLE LITE STRIPS) strip USE TO TEST BLOOD SUGAR THREE TIMES DAILY 100 Strip 11    KELLY PEN NEEDLE 32 gauge x 5/32\" ndle USE AS DIRECTED TWICE DAILY 100 Pen Needle 11    VICTOZA 2-HUGO 0.6 mg/0.1 mL (18 mg/3 mL) pnij ADMINISTER 1.8 MG UNDER THE SKIN DAILY 6 mL 0    HUMALOG KWIKPEN INSULIN 100 unit/mL kwikpen INJECT 4 UNITS UNDER THE SKIN BEFORE BREAKFAST, LUNCH, AND DINNER OR AS DIRECTED 15 mL 0    hydrocortisone (ANUSOL-HC) 25 mg supp Insert 1 Suppository into rectum every twelve (12) hours. 12 Each 11    witch hazel-glycerin (TUCKS) 12.5-50 % pad 1 Pad by PeriANAL route as needed for Pain. 100 Pad 11    FREESTYLE LANCETS 28 gauge misc USE TO TEST BLOOD SUGAR TID  11    Blood-Glucose Meter (FREESTYLE LITE METER) monitoring kit Use to test blood sugar three times daily. Dx.e119 1 Kit 0    Lancets misc Use to test blood sugar three times daily. dx.e11.9 100 Each 11    ONETOUCH ULTRA TEST strip TEST THREE TIMES DAILY 100 Strip 11    dicyclomine (BENTYL) 10 mg capsule Take 20 mg by mouth daily as needed.  naproxen (NAPROSYN) 500 mg tablet TAKE 1 TABLET BY MOUTH TWICE DAILY WITH MEALS 60 Tab 6    buPROPion SR (WELLBUTRIN SR) 150 mg SR tablet TAKE 1 TABLET BY MOUTH TWICE DAILY 30 Tab 11    ONE TOUCH DELICA 33 gauge misc TEST THREE TIMES DAILY 100 Lancet 11    albuterol (PROVENTIL VENTOLIN) 2.5 mg /3 mL (0.083 %) nebulizer solution every four (4) hours as needed.  predniSONE (DELTASONE) 20 mg tablet Take 40 mg by mouth daily (with breakfast). 10 Tab 1    cefUROXime (CEFTIN) 250 mg tablet Take 2 Tabs by mouth two (2) times a day.  14 Tab 0     Past Medical History:   Diagnosis Date    Arthritis     Asthma     Cancer of left breast ([facility] 08/01/2016    BREAST left lumpectomy 10/16 with b/l reduction, Adjuvant chemotherapy with TAC x 4,stopped due to peripheral neuropathy,Radiation 3/17 at [address] Chronic pain     lower extremities    Diabetes [facility] [address]     Dyslipidemia     IBS (irritable bowel syndrome)     Keloid 2017    Excision of keloid scar approximately 4 cm -Jo Santana MD     Nausea & vomiting     Noncompliance     Obesity     Plantar fasciitis, right 2019    Psychiatric disorder 2018    Normal MRI of the brain.  Radiation therapy complication 9859    left    S/P breast biopsy, left 16    Sinusitis     Stress at home      Past Surgical History:   Procedure Laterality Date    HX BREAST LUMPECTOMY Left 10/13/2016    LEFT BREAST REDUCTION LUMPECTOMY, LEFT SENTINEL NODE BIOPSY, LEFT BRACKETED NEEDLE LOCALIZATION, LEFT BREAST RECONSTRUCTION performed by Jo Santana MD at [address] HX BREAST RECONSTRUCTION Bilateral 10/13/2016    BREAST REDUCTION performed by Sneha Guevara MD at [address] HX BREAST REDUCTION Bilateral 2016    HX  SECTION  [id number]    X2    HX GYN  2002    ABLATION    HX HERNIA REPAIR       umbilical as infant    HX HYSTERECTOMY      rosangela bso    HX ORTHOPAEDIC Left     left dequervains    HX VASCULAR ACCESS Right 2016,2017    insertiona and removal of power port     Allergies   Allergen Reactions    Atorvastatin Other (comments)     Joint pains in arms    Codeine Diarrhea    Pravastatin Myalgia     Terrible leg aching.      Theophylline Diarrhea         REVIEW OF SYSTEMS:  General: negative for - chills or fever  ENT: negative for - headaches, nasal congestion or tinnitus  Respiratory: negative for - cough, hemoptysis, shortness of breath or wheezing  Cardiovascular : negative for - chest pain, edema, palpitations or shortness of breath  Gastrointestinal: negative for - abdominal pain, blood in stools, heartburn or nausea/vomiting  Genito-Urinary: no dysuria, trouble voiding, or hematuria  Musculoskeletal: negative for - gait disturbance, joint pain, joint stiffness or joint swelling  Neurological: no TIA or stroke symptoms  Hematologic: no bruises, no bleeding, no swollen glands  Integument: no lumps, mole changes, nail changes or rash  Endocrine: no malaise/lethargy or unexpected weight changes      Social History     Socioeconomic History    Marital status: SINGLE     Spouse name: Not on file    Number of children: Not on file    Years of education: Not on file    Highest education level: Not on file   Tobacco Use    Smoking status: Never Smoker    Smokeless tobacco: Never Used   Substance and Sexual Activity    Alcohol use: No    Drug use: No    Sexual activity: Yes     Partners: Male     Birth control/protection: None   Social History Narrative         Medical History: Recurrent depressive d/o 2012Muscloskeletal back pain 2012Carpal tunnel syndrome 2012Asthma 1979Obesity 2012IBS (nima Wilkerson) 2012diabetes mellitus type 2 2013    Gyn History: Last mammogram date 2013. Surgical History:  x2, RA 10/02    Hospitalization/Major Diagnostic Procedure: asthma childhood    Family History: Mother: alive 58 yrs CAD, HTN, depression Father: alive 61 yrs Brother(s): 40 yrs mental issues Son(s): alive 19,23 yrs 1:    bipolar and schizophrenia on [employer] dariuss [id number] 1 brother(s) . 2 son(s) . Social History: Alcohol Use Patient does not use alcohol. Smoking Status Patient is a never smoker. Marital Status: Single. Lives w ith: alone    son. Occupation/W ork: employed full time sw [employer] at [employer]. Education/School: has highschool diploma, college.  [employer]: [employer].     Family History   Problem Relation Age of Onset    Heart Disease Mother     Hypertension Mother     Heart Disease Father     Seizures Sister     Anesth Problems Neg Hx        OBJECTIVE:    Visit Vitals  /76   Pulse 95   Temp 98.3 °F (36.8 °C) (Oral)   Resp 20   Ht 5' 1\" (1.549 m)   Wt 242 lb 11.2 oz (110.1 kg)   SpO2 95% BMI 45.86 kg/m²     CONSTITUTIONAL: well , well nourished, appears age appropriate  EYES: perrla, eom intact  ENMT:moist mucous membranes, pharynx clear  NECK: supple. Thyroid normal  RESPIRATORY: Chest: clear bilaterally   CARDIOVASCULAR: Heart: regular rate and rhythm  GASTROINTESTINAL: Abdomen: soft, bowel sounds active  HEMATOLOGIC: no pathological lymph nodes palpated  MUSCULOSKELETAL: Extremities: no edema, pulse 1+   INTEGUMENT: No unusual rashes or suspicious skin lesions noted. Nails appear normal.  NEUROLOGIC: non-focal exam   MENTAL STATUS: alert and oriented, appropriate affect           ASSESSMENT:  1. Essential hypertension    2. Carcinoma of left breast, stage 2, estrogen receptor negative ([address]    3. Uncomplicated asthma, unspecified asthma severity, unspecified whether persistent    4. Obesity, morbid ([address]    5. Noncompliance    6. Irritable bowel syndrome without diarrhea    7. Dyslipidemia    8. Type 2 diabetes mellitus without complication, unspecified whether long term insulin use ([address]    9. Generalized abdominal pain      Blood pressure control is at goal.      She has completed her therapy for her breast cancer. No evidence of bronchospasm on exam today. Her BMI remains in the morbid obese category and reflects a 3 lb weight gain since we last saw her, indicating that she still likes to consume more than her required amount. This contributes obviously to her noncompliance with regards to diabetes, however readings she gave me are better than what they were in the past.  The abdominal pain is concerning. She has a history of IBS. This may all be related to IBS, that is the diarrhea and abdominal pain, however we exclude other pathology. We will ask for a CT of the abdomen and pelvis since she has never had one and that is a study that should be done before making the diagnosis of IBS.   In addition, if the diarrhea continues, we suggest that she see a gastroenterologist.  She is an employee of [employer] and therefore we suggest to see one there. We are going to put her on a sliding scale for diabetes before meals. She continues to use basal insulin and begin 40 units before meals in addition to sliding scale. She will be back to see us in December. If the diarrhea does not subside with the assistance of the GI doctor, then she will see me before then. I have discussed the diagnosis with the patient and the intended plan as seen in the  orders above. The patient understands and agees with the plan. The patient has   received an after visit summary and questions were answered concerning  future plans  Patient labs and/or xrays were reviewed  Past records were reviewed. PLAN:  .  Orders Placed This Encounter    CT ABD PELV W CONT    pitavastatin calcium (LIVALO) 1 mg tab tablet       Follow-up and Dispositions    · Return in about 3 months (around 1/17/2020). ATTENTION:   This medical record was transcribed using an electronic medical records system. Although proofread, it may and can contain electronic and spelling errors. Other human spelling and other errors may be present. Corrections may be executed at a later time. Please feel free to contact us for any clarifications as needed.

## 2019-10-20 RX ORDER — PREDNISONE 20 MG/1
TABLET ORAL
Qty: 10 TAB | Refills: 0 | Status: SHIPPED | OUTPATIENT
Start: 2019-10-20 | End: 2019-10-22 | Stop reason: SDUPTHER

## 2019-10-22 RX ORDER — PREDNISONE 20 MG/1
TABLET ORAL
Qty: 10 TAB | Refills: 0 | Status: SHIPPED | OUTPATIENT
Start: 2019-10-22 | End: 2020-06-23

## 2019-10-24 LAB — COLONOSCOPY, EXTERNAL: NORMAL

## 2019-11-11 PROBLEM — K43.9 VENTRAL HERNIA: Status: ACTIVE | Noted: 2019-11-05

## 2019-11-11 PROBLEM — K76.0 HEPATIC STEATOSIS: Status: ACTIVE | Noted: 2019-11-05

## 2019-11-11 PROBLEM — K57.90 DIVERTICULOSIS: Status: ACTIVE | Noted: 2019-11-05

## 2019-11-11 PROBLEM — R10.9 ABDOMINAL PAIN: Status: ACTIVE | Noted: 2019-11-05

## 2019-12-01 DIAGNOSIS — R10.84 GENERALIZED ABDOMINAL PAIN: ICD-10-CM

## 2019-12-01 DIAGNOSIS — K58.9 IRRITABLE BOWEL SYNDROME WITHOUT DIARRHEA: ICD-10-CM

## 2019-12-06 ENCOUNTER — OFFICE VISIT (OUTPATIENT)
Dept: INTERNAL MEDICINE CLINIC | Age: 49
End: 2019-12-06

## 2019-12-06 VITALS
TEMPERATURE: 98.9 F | WEIGHT: 239 LBS | DIASTOLIC BLOOD PRESSURE: 82 MMHG | BODY MASS INDEX: 45.12 KG/M2 | HEIGHT: 61 IN | HEART RATE: 103 BPM | OXYGEN SATURATION: 96 % | RESPIRATION RATE: 22 BRPM | SYSTOLIC BLOOD PRESSURE: 140 MMHG

## 2019-12-06 DIAGNOSIS — G62.0 CHEMOTHERAPY-INDUCED NEUROPATHY (HCC): ICD-10-CM

## 2019-12-06 DIAGNOSIS — E11.9 TYPE 2 DIABETES MELLITUS WITHOUT COMPLICATION, UNSPECIFIED WHETHER LONG TERM INSULIN USE (HCC): Primary | ICD-10-CM

## 2019-12-06 DIAGNOSIS — M25.60 JOINT STIFFNESS: ICD-10-CM

## 2019-12-06 DIAGNOSIS — C50.912 CARCINOMA OF LEFT BREAST, STAGE 2, ESTROGEN RECEPTOR NEGATIVE (HCC): ICD-10-CM

## 2019-12-06 DIAGNOSIS — I10 ESSENTIAL HYPERTENSION: ICD-10-CM

## 2019-12-06 DIAGNOSIS — K52.1 CHEMOTHERAPY INDUCED DIARRHEA: ICD-10-CM

## 2019-12-06 DIAGNOSIS — T45.1X5A CHEMOTHERAPY-INDUCED NEUROPATHY (HCC): ICD-10-CM

## 2019-12-06 DIAGNOSIS — E66.01 OBESITY, MORBID (HCC): ICD-10-CM

## 2019-12-06 DIAGNOSIS — Z17.1 CARCINOMA OF LEFT BREAST, STAGE 2, ESTROGEN RECEPTOR NEGATIVE (HCC): ICD-10-CM

## 2019-12-06 DIAGNOSIS — T45.1X5A CHEMOTHERAPY INDUCED DIARRHEA: ICD-10-CM

## 2019-12-06 PROBLEM — Z98.890 S/P COLONOSCOPY: Status: ACTIVE | Noted: 2019-10-24

## 2019-12-06 RX ORDER — INSULIN LISPRO 100 [IU]/ML
12 INJECTION, SOLUTION INTRAVENOUS; SUBCUTANEOUS
Qty: 15 ML | Refills: 11 | Status: SHIPPED | OUTPATIENT
Start: 2019-12-06 | End: 2020-12-05

## 2019-12-06 RX ORDER — INSULIN GLARGINE 100 [IU]/ML
30 INJECTION, SOLUTION SUBCUTANEOUS
Qty: 15 ML | Refills: 11 | Status: SHIPPED | OUTPATIENT
Start: 2019-12-06 | End: 2020-10-16

## 2019-12-06 RX ORDER — TRIAMCINOLONE ACETONIDE 1 MG/G
CREAM TOPICAL 2 TIMES DAILY
Qty: 15 G | Refills: 11 | Status: SHIPPED | OUTPATIENT
Start: 2019-12-06 | End: 2022-01-24 | Stop reason: SDUPTHER

## 2019-12-06 NOTE — PROGRESS NOTES
1. Have you been to the ER, urgent care clinic since your last visit? Hospitalized since your last visit? No    2. Have you seen or consulted any other health care providers outside of the [address] [place] [address] since your last visit? Include any pap smears or colon screening.  No     Form completion

## 2019-12-06 NOTE — PROGRESS NOTES
SPORTS MEDICINE AND PRIMARY CARE  Rc Zayas MD, [address] [address]  Phone:  [phone]  Fax: [phone]       Chief Complaint   Patient presents with    Form Completion   . SUBJECTIVE:    Armond Angel is a 47 y.o. female Patient returns today with history of poorly controlled diabetes, hemoglobin A1c greater than 11, breast cancer, obesity, noncompliance, primary hypertension, chemotherapy induced neuropathy and diarrhea, and is seen for evaluation. Patient comes in today for [employer] form to be completed for a Serious Health Condition. We complete the Certification of Healthcare Form in patient's present and she will review it. Patient currently complains of an area of asteatosis on her abdomen and wants cream for it and needs refill of her insulin. Current Outpatient Medications   Medication Sig Dispense Refill    insulin lispro (HUMALOG KWIKPEN INSULIN) 100 unit/mL kwikpen 12 Units by SubCUTAneous route Before breakfast, lunch, and dinner. 15 mL 11    insulin glargine (LANTUS SOLOSTAR U-100 INSULIN) 100 unit/mL (3 mL) inpn 30 Units by SubCUTAneous route nightly. ADMINISTER 25 UNITS UNDER THE SKIN EVERY NIGHT 15 mL 11    triamcinolone acetonide (KENALOG) 0.1 % topical cream Apply  to affected area two (2) times a day. 15 g 11    pitavastatin calcium (LIVALO) 1 mg tab tablet Take 1 Tab by mouth daily. 30 Tab 11    albuterol (PROVENTIL HFA, VENTOLIN HFA, PROAIR HFA) 90 mcg/actuation inhaler Take 2 Puffs by inhalation every four (4) hours as needed for Wheezing. 1 Inhaler 11    albuterol (PROVENTIL HFA, VENTOLIN HFA, PROAIR HFA) 90 mcg/actuation inhaler Take 2 Puffs by inhalation every four (4) hours as needed for Wheezing. 1 Inhaler 11    budesonide-formoterol (SYMBICORT) 160-4.5 mcg/actuation HFAA Take 2 Puffs by inhalation two (2) times a day.  1 Inhaler 11    sodium chloride (OCEAN) 0.65 % nasal squeeze bottle 0.1 mL by Both Nostrils route four (4) times daily. 45 mL 11    glucose blood VI test strips (FREESTYLE LITE STRIPS) strip USE TO TEST BLOOD SUGAR THREE TIMES DAILY 100 Strip 11    KELLY PEN NEEDLE 32 gauge x 5/32\" ndle USE AS DIRECTED TWICE DAILY 100 Pen Needle 11    VICTOZA 2-HUGO 0.6 mg/0.1 mL (18 mg/3 mL) pnij ADMINISTER 1.8 MG UNDER THE SKIN DAILY 6 mL 0    hydrocortisone (ANUSOL-HC) 25 mg supp Insert 1 Suppository into rectum every twelve (12) hours. 12 Each 11    FREESTYLE LANCETS 28 gauge misc USE TO TEST BLOOD SUGAR TID  11    Blood-Glucose Meter (FREESTYLE LITE METER) monitoring kit Use to test blood sugar three times daily. Dx.e119 1 Kit 0    Lancets misc Use to test blood sugar three times daily. dx.e11.9 100 Each 11    ONETOUCH ULTRA TEST strip TEST THREE TIMES DAILY 100 Strip 11    dicyclomine (BENTYL) 10 mg capsule Take 20 mg by mouth daily as needed.  naproxen (NAPROSYN) 500 mg tablet TAKE 1 TABLET BY MOUTH TWICE DAILY WITH MEALS 60 Tab 6    buPROPion SR (WELLBUTRIN SR) 150 mg SR tablet TAKE 1 TABLET BY MOUTH TWICE DAILY 30 Tab 11    ONE TOUCH DELICA 33 gauge misc TEST THREE TIMES DAILY 100 Lancet 11    albuterol (PROVENTIL VENTOLIN) 2.5 mg /3 mL (0.083 %) nebulizer solution every four (4) hours as needed.  predniSONE (DELTASONE) 20 mg tablet TAKE 2 TABLETS BY MOUTH DAILY WITH BREAKFAST 10 Tab 0    cefUROXime (CEFTIN) 250 mg tablet Take 2 Tabs by mouth two (2) times a day. 14 Tab 0    witch hazel-glycerin (TUCKS) 12.5-50 % pad 1 Pad by PeriANAL route as needed for Pain.  100 Pad 11     Past Medical History:   Diagnosis Date    Abdominal pain 11/05/2019    ct [facility] - hepatomegaly with diffuse hepatic steatosis, diverticulosis,sma;; supraumbilical ventral hernia containing fat    Arthritis     Asthma     Cancer of left breast ([facility] [address] 08/01/2016    BREAST left lumpectomy 10/16 with b/l reduction, Adjuvant chemotherapy with TAC x 4,stopped due to peripheral neuropathy,Radiation 3/17 at [facility]    Chronic pain     lower extremities    Diabetes [facility] [address] 2012    Diverticulosis 2019    ct    Dyslipidemia     Hepatic steatosis 2019    ct    IBS (irritable bowel syndrome)     Keloid 2017    Excision of keloid scar approximately 4 cm -Raul Dominguez MD     Nausea & vomiting     Noncompliance     Obesity     Plantar fasciitis, right 2019    Psychiatric disorder 2018    Normal MRI of the brain.  Radiation therapy complication 1487    left    S/P breast biopsy, left 16    Sinusitis     Stress at home     Ventral hernia 2019     Past Surgical History:   Procedure Laterality Date    HX BREAST LUMPECTOMY Left 10/13/2016    LEFT BREAST REDUCTION LUMPECTOMY, LEFT SENTINEL NODE BIOPSY, LEFT BRACKETED NEEDLE LOCALIZATION, LEFT BREAST RECONSTRUCTION performed by Raul Dominguez MD at [address] HX BREAST RECONSTRUCTION Bilateral 10/13/2016    BREAST REDUCTION performed by Yonas Rodriguez MD at [address] HX BREAST REDUCTION Bilateral 2016    HX  SECTION  [id number]    X2    HX GYN  2002    ABLATION    HX HERNIA REPAIR       umbilical as infant    HX HYSTERECTOMY      rosangela bso    HX ORTHOPAEDIC Left     left dequervains    HX VASCULAR ACCESS Right 2016,2017    insertiona and removal of power port     Allergies   Allergen Reactions    Atorvastatin Other (comments)     Joint pains in arms    Codeine Diarrhea    Pravastatin Myalgia     Terrible leg aching.      Theophylline Diarrhea         REVIEW OF SYSTEMS:  General: negative for - chills or fever  ENT: negative for - headaches, nasal congestion or tinnitus  Respiratory: negative for - cough, hemoptysis, shortness of breath or wheezing  Cardiovascular : negative for - chest pain, edema, palpitations or shortness of breath  Gastrointestinal: negative for - abdominal pain, blood in stools, heartburn or nausea/vomiting  Genito-Urinary: no dysuria, trouble voiding, or hematuria  Musculoskeletal: negative for - gait disturbance, joint pain, joint stiffness or joint swelling  Neurological: no TIA or stroke symptoms  Hematologic: no bruises, no bleeding, no swollen glands  Integument: no lumps, mole changes, nail changes or rash  Endocrine: no malaise/lethargy or unexpected weight changes      Social History     Socioeconomic History    Marital status: SINGLE     Spouse name: Not on file    Number of children: Not on file    Years of education: Not on file    Highest education level: Not on file   Tobacco Use    Smoking status: Never Smoker    Smokeless tobacco: Never Used   Substance and Sexual Activity    Alcohol use: No    Drug use: No    Sexual activity: Yes     Partners: Male     Birth control/protection: None   Social History Narrative         Medical History: Recurrent depressive d/o 2012Muscloskeletal back pain 2012Carpal tunnel syndrome 2012Asthma 1979Obesity 2012IBS (h.    Rueda Stains) 2012diabetes mellitus type 2 2013    Gyn History: Last mammogram date 2013. Surgical History:  x2, RA 10/02    Hospitalization/Major Diagnostic Procedure: asthma childhood    Family History: Mother: alive 58 yrs CAD, HTN, depression Father: alive 61 yrs Brother(s): 40 yrs mental issues Son(s): alive 19,23 yrs 1:    bipolar and schizophrenia on [facility] darjays [id number] 1 brother(s) . 2 son(s) . Social History: Alcohol Use Patient does not use alcohol. Smoking Status Patient is a never smoker. Marital Status: Single. Lives w ith: alone    son. Occupation/W ork: employed full time sw [employer] at [employer] [place] [employer]. Education/School: has highschool diploma, college.  [employer]: [employer].     Family History   Problem Relation Age of Onset    Heart Disease Mother     Hypertension Mother     Heart Disease Father     Seizures Sister     Anesth Problems Neg Hx        OBJECTIVE:    Visit Vitals  /82   Pulse (!) 103   Temp 98.9 °F (37.2 °C) (Oral)   Resp 22   Ht 5' 1\" (1.549 m)   Wt 239 lb (108.4 kg)   SpO2 96%   BMI 45.16 kg/m²     CONSTITUTIONAL: well , well nourished, appears age appropriate  EYES: perrla, eom intact  ENMT:moist mucous membranes, pharynx clear  NECK: supple. Thyroid normal  RESPIRATORY: Chest: clear bilaterally   CARDIOVASCULAR: Heart: regular rate and rhythm  GASTROINTESTINAL: Abdomen: soft, bowel sounds active  HEMATOLOGIC: no pathological lymph nodes palpated  MUSCULOSKELETAL: Extremities: no edema, pulse 1+   INTEGUMENT: No unusual rashes or suspicious skin lesions noted. Nails appear normal.  NEUROLOGIC: non-focal exam   MENTAL STATUS: alert and oriented, appropriate affect           ASSESSMENT:  1. Type 2 diabetes mellitus without complication, unspecified whether long term insulin use (HCC)    2. Carcinoma of left breast, stage 2, estrogen receptor negative (HCC)    3. Obesity, morbid (HCC)    4. Joint stiffness    5. Essential hypertension    6. Chemotherapy-induced neuropathy ([address]    7. Chemotherapy induced diarrhea      Blood sugar control is atrocious. We increased the Lantus to 30 units subq q.h.s. We increased the Humalog to 12 units a.c. meals. We would like her to come back in a month to give us her blood sugar readings, or at least call [facility] and tell her what her blood sugar readings are, at which point then we will further titrate her insulin. She completed her chemotherapy for left breast cancer stage 2 and is now in the observation stage. She sees Dr. Angel Joseph every year. Amazingly, she has lost a couple pounds since we last saw her in spite of the holidays. We encouraged physical activity and a heart healthy, weight reducing diet. Blood pressure control is approaching goal.    She had chemotherapy induced neuropathy and diarrhea, which is interfering with her ability to work. The neuropathy causes excruciating pain that does not allow her to walk, sit or stand for any length of time intermittently.   The diarrhea is also intermittent that is of such magnitude that she is not able to function at work. We therefore complete the FMLA form based on those factors. She will be back to see us in January. I have discussed the diagnosis with the patient and the intended plan as seen in the  orders above. The patient understands and agees with the plan. The patient has   received an after visit summary and questions were answered concerning  future plans  Patient labs and/or xrays were reviewed  Past records were reviewed. PLAN:  .  Orders Placed This Encounter    REFERRAL TO OPHTHALMOLOGY    insulin lispro (HUMALOG KWIKPEN INSULIN) 100 unit/mL kwikpen    insulin glargine (LANTUS SOLOSTAR U-100 INSULIN) 100 unit/mL (3 mL) inpn    triamcinolone acetonide (KENALOG) 0.1 % topical cream       Follow-up and Dispositions    · Return in about 1 month (around 1/6/2020). ATTENTION:   This medical record was transcribed using an electronic medical records system. Although proofread, it may and can contain electronic and spelling errors. Other human spelling and other errors may be present. Corrections may be executed at a later time. Please feel free to contact us for any clarifications as needed.

## 2020-02-28 ENCOUNTER — DOCUMENTATION ONLY (OUTPATIENT)
Dept: SURGERY | Age: 50
End: 2020-02-28

## 2020-02-28 NOTE — PROGRESS NOTES
Faxed signed order to [phone] for a BILATERAL diagnostic mammogram and LEFT U/S, confirmation received. They faxed this order over to us for Dr. Lissette Cabrera.

## 2020-06-23 ENCOUNTER — OFFICE VISIT (OUTPATIENT)
Dept: INTERNAL MEDICINE CLINIC | Age: 50
End: 2020-06-23

## 2020-06-23 VITALS
RESPIRATION RATE: 16 BRPM | HEIGHT: 61 IN | DIASTOLIC BLOOD PRESSURE: 71 MMHG | OXYGEN SATURATION: 97 % | TEMPERATURE: 98.4 F | SYSTOLIC BLOOD PRESSURE: 123 MMHG | HEART RATE: 100 BPM | WEIGHT: 250.3 LBS | BODY MASS INDEX: 47.25 KG/M2

## 2020-06-23 DIAGNOSIS — J45.909 UNCOMPLICATED ASTHMA, UNSPECIFIED ASTHMA SEVERITY, UNSPECIFIED WHETHER PERSISTENT: ICD-10-CM

## 2020-06-23 DIAGNOSIS — E11.9 TYPE 2 DIABETES MELLITUS WITHOUT COMPLICATION, UNSPECIFIED WHETHER LONG TERM INSULIN USE (HCC): ICD-10-CM

## 2020-06-23 DIAGNOSIS — K76.0 HEPATIC STEATOSIS: ICD-10-CM

## 2020-06-23 DIAGNOSIS — E78.5 DYSLIPIDEMIA: ICD-10-CM

## 2020-06-23 DIAGNOSIS — I10 ESSENTIAL HYPERTENSION: ICD-10-CM

## 2020-06-23 DIAGNOSIS — C50.919 MALIGNANT NEOPLASM OF BREAST, STAGE 1, ESTROGEN RECEPTOR NEGATIVE, UNSPECIFIED LATERALITY (HCC): ICD-10-CM

## 2020-06-23 DIAGNOSIS — G62.0 CHEMOTHERAPY-INDUCED NEUROPATHY (HCC): ICD-10-CM

## 2020-06-23 DIAGNOSIS — Z17.1 CARCINOMA OF LEFT BREAST, STAGE 2, ESTROGEN RECEPTOR NEGATIVE (HCC): ICD-10-CM

## 2020-06-23 DIAGNOSIS — C50.912 CARCINOMA OF LEFT BREAST, STAGE 2, ESTROGEN RECEPTOR NEGATIVE (HCC): ICD-10-CM

## 2020-06-23 DIAGNOSIS — K58.9 IRRITABLE BOWEL SYNDROME WITHOUT DIARRHEA: Primary | ICD-10-CM

## 2020-06-23 DIAGNOSIS — T45.1X5A CHEMOTHERAPY-INDUCED NEUROPATHY (HCC): ICD-10-CM

## 2020-06-23 DIAGNOSIS — Z17.1 MALIGNANT NEOPLASM OF BREAST, STAGE 1, ESTROGEN RECEPTOR NEGATIVE, UNSPECIFIED LATERALITY (HCC): ICD-10-CM

## 2020-06-23 RX ORDER — DICYCLOMINE HYDROCHLORIDE 10 MG/1
20 CAPSULE ORAL 4 TIMES DAILY
Qty: 120 CAP | Refills: 11 | Status: SHIPPED | OUTPATIENT
Start: 2020-06-23 | End: 2021-05-17

## 2020-06-23 RX ORDER — DULOXETIN HYDROCHLORIDE 20 MG/1
20 CAPSULE, DELAYED RELEASE ORAL DAILY
Qty: 30 CAP | Refills: 5 | Status: SHIPPED | OUTPATIENT
Start: 2020-06-23 | End: 2021-05-17

## 2020-06-23 NOTE — ASSESSMENT & PLAN NOTE
This condition is managed by Specialist.  Key Oncology Meds     Patient is on no Oncologic meds.         Key Pain Meds             naproxen (NAPROSYN) 500 mg tablet TAKE 1 TABLET BY MOUTH TWICE DAILY WITH MEALS        No results found for: WBC, WBCT, WBCPOC, ANEU, HGB, HGBPOC, HCT, HCTPOC, PLT, PLTPOC, CREA, CREAPOC, CREATEXT, BUN, IBUN, BUNPOC, ALTPOC, ALT, ASTPOC, ALB, ALBPOC, PREALB, PSA, PSA2, [id number], PSALT, HGBEXT, HCTEXT, PLTEXT

## 2020-06-23 NOTE — ASSESSMENT & PLAN NOTE
This condition is managed by Specialist.  No results found for: WBC, WBCT, WBCPOC, HGB, HGBPOC, HCT, HCTPOC, PLT, PLTPOC, CREA, CREAPOC, BUN, IBUN, BUNPOC, K, KPOCT, INR, INREXT, PTP, PTINR, PTEXT, HGBEXT, HCTEXT, PLTEXT, INREXT, PTEXT

## 2020-06-23 NOTE — ASSESSMENT & PLAN NOTE
This condition is managed by Specialist.  Key Oncology Meds     Patient is on no Oncologic meds.         Key Pain Meds             naproxen (NAPROSYN) 500 mg tablet TAKE 1 TABLET BY MOUTH TWICE DAILY WITH MEALS        No results found for: WBC, WBCT, WBCPOC, ANEU, HGB, HGBPOC, HCT, HCTPOC, PLT, PLTPOC, CREA, CREAPOC, CREATEXT, BUN, IBUN, BUNPOC, ALTPOC, ALT, ASTPOC, ALB, ALBPOC, PREALB, PSA, PSA2, [id number], PSALT, HGBEXT, HCTEXT, PLTEXT

## 2020-06-23 NOTE — ASSESSMENT & PLAN NOTE
Stable, based on history, physical exam and review of pertinent labs, studies and medications; meds reconciled; continue current treatment plan. Key Antihyperglycemic Medications             insulin lispro (HUMALOG KWIKPEN INSULIN) 100 unit/mL kwikpen (Taking) 12 Units by SubCUTAneous route Before breakfast, lunch, and dinner. insulin glargine (LANTUS SOLOSTAR U-100 INSULIN) 100 unit/mL (3 mL) inpn (Taking) 30 Units by SubCUTAneous route nightly. ADMINISTER 25 UNITS UNDER THE SKIN EVERY NIGHT    VICTOZA 2-HUGO 0.6 mg/0.1 mL (18 mg/3 mL) pnij (Taking) ADMINISTER 1.8 MG UNDER THE SKIN DAILY        Other Key Diabetic Medications             pitavastatin calcium (LIVALO) 1 mg tab tablet (Taking) Take 1 Tab by mouth daily.         Lab Results   Component Value Date/Time    Hemoglobin A1c 12.6 09/25/2019 12:59 PM    Microalb/Creat ratio (ug/mg creat.) <6.7 09/25/2019 12:59 PM    Cholesterol, total 230 09/25/2019 12:59 PM    HDL Cholesterol 44 09/25/2019 12:59 PM    LDL, calculated 138 09/25/2019 12:59 PM    Triglyceride 240 09/25/2019 12:59 PM     Diabetic Foot and Eye Exam HM Status   Topic Date Due    Eye Exam  11/06/2016    Diabetic Foot Care  09/25/2020

## 2020-06-23 NOTE — PROGRESS NOTES
1. Have you been to the ER, urgent care clinic since your last visit? Hospitalized since your last visit? No    2. Have you seen or consulted any other health care providers outside of the [address] [place] [address] since your last visit? Include any pap smears or colon screening.  No     Diabetic FMLA form

## 2020-06-23 NOTE — PROGRESS NOTES
SPORTS MEDICINE AND PRIMARY CARE  Sandi Knapp MD, [address] [address]  Phone:  [phone]  Fax: [phone]       Chief Complaint   Patient presents with    Diabetes   . SUBJECTIVE:    Viky Moulton is a 45 y.o. female Patient returns today with a known history of stage II carcinoma of the breast, lumpectomy 10/13/16, RT radiation reduction mammoplasty, left breast, 3 cm, GR3 ICD 0-4 LN involvement, triple negative, clear margins, T2, PN0, (SN), PMX RT breast benign. On 01/31/17 she completed adjuvant chemotherapy, (TAC x four), by Dr. Opal Matos, stopped due to peripheral neuropathy. On 03/20/17 she started radiation treatment and completed 6/30 at that time at [facility]. She completed radiation therapy treatments. Other medical problems included peripheral neuropathy, chemotherapy induced, diabetes mellitus type 2, hepatic steatosis, asthma, dyslipidemia, primary hypertension, irritable bowel syndrome, and is seen for evaluation. Current Outpatient Medications   Medication Sig Dispense Refill    dicyclomine (BentyL) 10 mg capsule Take 2 Caps by mouth four (4) times daily. 120 Cap 11    DULoxetine (CYMBALTA) 20 mg capsule Take 1 Cap by mouth daily. 30 Cap 5    insulin lispro (HUMALOG KWIKPEN INSULIN) 100 unit/mL kwikpen 12 Units by SubCUTAneous route Before breakfast, lunch, and dinner. 15 mL 11    insulin glargine (LANTUS SOLOSTAR U-100 INSULIN) 100 unit/mL (3 mL) inpn 30 Units by SubCUTAneous route nightly. ADMINISTER 25 UNITS UNDER THE SKIN EVERY NIGHT 15 mL 11    triamcinolone acetonide (KENALOG) 0.1 % topical cream Apply  to affected area two (2) times a day. 15 g 11    pitavastatin calcium (LIVALO) 1 mg tab tablet Take 1 Tab by mouth daily. 30 Tab 11    albuterol (PROVENTIL HFA, VENTOLIN HFA, PROAIR HFA) 90 mcg/actuation inhaler Take 2 Puffs by inhalation every four (4) hours as needed for Wheezing.  1 Inhaler 11    albuterol (PROVENTIL HFA, VENTOLIN HFA, PROAIR HFA) 90 mcg/actuation inhaler Take 2 Puffs by inhalation every four (4) hours as needed for Wheezing. 1 Inhaler 11    budesonide-formoterol (SYMBICORT) 160-4.5 mcg/actuation HFAA Take 2 Puffs by inhalation two (2) times a day. 1 Inhaler 11    glucose blood VI test strips (FREESTYLE LITE STRIPS) strip USE TO TEST BLOOD SUGAR THREE TIMES DAILY 100 Strip 11    KELLY PEN NEEDLE 32 gauge x 5/32\" ndle USE AS DIRECTED TWICE DAILY 100 Pen Needle 11    VICTOZA 2-HUGO 0.6 mg/0.1 mL (18 mg/3 mL) pnij ADMINISTER 1.8 MG UNDER THE SKIN DAILY 6 mL 0    hydrocortisone (ANUSOL-HC) 25 mg supp Insert 1 Suppository into rectum every twelve (12) hours. 12 Each 11    witch hazel-glycerin (TUCKS) 12.5-50 % pad 1 Pad by PeriANAL route as needed for Pain. 100 Pad 11    FREESTYLE LANCETS 28 gauge misc USE TO TEST BLOOD SUGAR TID  11    Blood-Glucose Meter (FREESTYLE LITE METER) monitoring kit Use to test blood sugar three times daily. Dx.e119 1 Kit 0    Lancets misc Use to test blood sugar three times daily. dx.e11.9 100 Each 11    ONETOUCH ULTRA TEST strip TEST THREE TIMES DAILY 100 Strip 11    ONE TOUCH DELICA 33 gauge misc TEST THREE TIMES DAILY 100 Lancet 11    albuterol (PROVENTIL VENTOLIN) 2.5 mg /3 mL (0.083 %) nebulizer solution every four (4) hours as needed.  sodium chloride (OCEAN) 0.65 % nasal squeeze bottle 0.1 mL by Both Nostrils route four (4) times daily.  45 mL 11     Past Medical History:   Diagnosis Date    Abdominal pain 11/05/2019    ct vcu - hepatomegaly with diffuse hepatic steatosis, diverticulosis,sma;; supraumbilical ventral hernia containing fat    Arthritis     Asthma     Cancer of left breast ([facility] 08/01/2016    BREAST left lumpectomy 10/16 with b/l reduction, Adjuvant chemotherapy with TAC x 4,stopped due to peripheral neuropathy,Radiation 3/17 at [address] Chronic pain     lower extremities    Diabetes ([facility] 2012    Diverticulosis 11/05/2019    ct    Dyslipidemia     Hepatic steatosis 2019    ct    IBS (irritable bowel syndrome)     Keloid 2017    Excision of keloid scar approximately 4 cm -Kit Ramos MD     Nausea & vomiting     Noncompliance     Obesity     Plantar fasciitis, right 2019    Psychiatric disorder 2018    Normal MRI of the brain.  Radiation therapy complication 7331    left    S/P breast biopsy, left 16    S/P colonoscopy 10/24/2019    mcv polyp repeat 5 yrs    Sinusitis     Stress at home     Ventral hernia 2019     Past Surgical History:   Procedure Laterality Date    HX BREAST LUMPECTOMY Left 10/13/2016    LEFT BREAST REDUCTION LUMPECTOMY, LEFT SENTINEL NODE BIOPSY, LEFT BRACKETED NEEDLE LOCALIZATION, LEFT BREAST RECONSTRUCTION performed by Kit Ramos MD at [address] HX BREAST RECONSTRUCTION Bilateral 10/13/2016    BREAST REDUCTION performed by Raj Guevara MD at [address] HX BREAST REDUCTION Bilateral     HX  SECTION  0358.1011    X2    HX GYN  2002    ABLATION    HX HERNIA REPAIR       umbilical as infant    HX HYSTERECTOMY      rosangela bso    HX ORTHOPAEDIC Left     left dequervains    HX VASCULAR ACCESS Right 2016,2017    insertiona and removal of power port     Allergies   Allergen Reactions    Atorvastatin Other (comments)     Joint pains in arms    Codeine Diarrhea    Pravastatin Myalgia     Terrible leg aching.      Theophylline Diarrhea         REVIEW OF SYSTEMS:  General: negative for - chills or fever  ENT: negative for - headaches, nasal congestion or tinnitus  Respiratory: negative for - cough, hemoptysis, shortness of breath or wheezing  Cardiovascular : negative for - chest pain, edema, palpitations or shortness of breath  Gastrointestinal: negative for - abdominal pain, blood in stools, heartburn or nausea/vomiting  Genito-Urinary: no dysuria, trouble voiding, or hematuria  Musculoskeletal: negative for - gait disturbance, joint pain, joint stiffness or joint swelling  Neurological: no TIA or stroke symptoms  Hematologic: no bruises, no bleeding, no swollen glands  Integument: no lumps, mole changes, nail changes or rash  Endocrine: no malaise/lethargy or unexpected weight changes      Social History     Socioeconomic History    Marital status: SINGLE     Spouse name: Not on file    Number of children: Not on file    Years of education: Not on file    Highest education level: Not on file   Tobacco Use    Smoking status: Never Smoker    Smokeless tobacco: Never Used   Substance and Sexual Activity    Alcohol use: No    Drug use: No    Sexual activity: Yes     Partners: Male     Birth control/protection: None   Social History Narrative         Medical History: Recurrent depressive d/o 2012Muscloskeletal back pain 2012Carpal tunnel syndrome 2012Asthma 1979Obesity 2012IBS (nima Henriquez) diabetes mellitus type 2 2013    Gyn History: Last mammogram date 2013. Surgical History:  x2, RA 10/02    Hospitalization/Major Diagnostic Procedure: asthma childhood    Family History: Mother: alive 58 yrs CAD, HTN, depression Father: alive 61 yrs Brother(s): 40 yrs mental issues Son(s): alive 19,23 yrs 1:    bipolar and schizophrenia on SSI dariuss [id number] 1 brother(s) . 2 son(s) . Social History: Alcohol Use Patient does not use alcohol. Smoking Status Patient is a never smoker. Marital Status: Single. Lives w ith: alone    son. Occupation/W ork: employed full time sw [employer] at [employer]. Education/School: has highschool diploma, college.  Temple: 5th Mandae[employer].     Family History   Problem Relation Age of Onset    Heart Disease Mother     Hypertension Mother     Heart Disease Father     Seizures Sister     Anesth Problems Neg Hx        OBJECTIVE:    Visit Vitals  /71   Pulse 100   Temp 98.4 °F (36.9 °C) (Oral)   Resp 16   Ht 5' 1\" (1.549 m)   Wt 250 lb 4.8 oz (113.5 kg)   SpO2 97% BMI 47.29 kg/m²     CONSTITUTIONAL: well , well nourished, appears age appropriate  EYES: perrla, eom intact  ENMT:moist mucous membranes, pharynx clear  NECK: supple. Thyroid normal  RESPIRATORY: Chest: clear bilaterally   CARDIOVASCULAR: Heart: regular rate and rhythm  GASTROINTESTINAL: Abdomen: soft, bowel sounds active  HEMATOLOGIC: no pathological lymph nodes palpated  MUSCULOSKELETAL: Extremities: no edema, pulse 1+   INTEGUMENT: No unusual rashes or suspicious skin lesions noted. Nails appear normal.  NEUROLOGIC: non-focal exam   MENTAL STATUS: alert and oriented, appropriate affect           ASSESSMENT:  1. Irritable bowel syndrome without diarrhea    2. Type 2 diabetes mellitus without complication, unspecified whether long term insulin use ([address]    3. Malignant neoplasm of breast, stage 1, estrogen receptor negative, unspecified laterality ([address]    4. Essential hypertension    5. Dyslipidemia    6. Chemotherapy-induced neuropathy ([address]    7. Hepatic steatosis    8. Carcinoma of left breast, stage 2, estrogen receptor negative ([address]    9. Uncomplicated asthma, unspecified asthma severity, unspecified whether persistent      Patient's irritable bowel syndrome is trying to flare and therefore she would like to try some Bentyl again. She is taking her insulin, GLP1 inhibitor, as well as Metformin on a regular basis. We will check her hemoglobin A1c and report to her the results. She is no longer followed by oncology for her breast cancer. It was stage II without lymph node involvement. She started chemotherapy, completed radiation therapy and is having either [facility] or [facility] office do her mammograms. I do not get the reports, so I advise her that I have to trust that she is getting good reports. It is concerning to me. Blood pressure control is at goal.    We will check her cholesterol status with appropriate lipid studies. She has chemotherapy induced neuropathy.   The previous doctor was giving her Gabapentin. We advise her that we do not do Gabapentin on a chronic basis since it now a narcotic. We will give her Cymbalta and titrate the dose to induce comfort with the chemotherapy induced neuropathy. For hepatic steatosis we encourage a heart healthy, diabetic diet. No evidence of bronchospasm on exam.    She will be back to see us in four months, sooner if she has any problems. I have discussed the diagnosis with the patient and the intended plan as seen in the  orders above. The patient understands and agees with the plan. The patient has   received an after visit summary and questions were answered concerning  future plans  Patient labs and/or xrays were reviewed  Past records were reviewed. PLAN:  .  Orders Placed This Encounter    URINALYSIS W/ RFLX MICROSCOPIC    CBC WITH AUTOMATED DIFF    METABOLIC PANEL, COMPREHENSIVE    LIPID PANEL    TSH 3RD GENERATION    HEMOGLOBIN A1C WITH EAG    dicyclomine (BentyL) 10 mg capsule    DULoxetine (CYMBALTA) 20 mg capsule       Follow-up and Dispositions    · Return in about 4 months (around 10/23/2020). ATTENTION:   This medical record was transcribed using an electronic medical records system. Although proofread, it may and can contain electronic and spelling errors. Other human spelling and other errors may be present. Corrections may be executed at a later time. Please feel free to contact us for any clarifications as needed.

## 2020-06-25 LAB
ALBUMIN SERPL-MCNC: 4.1 G/DL (ref 3.8–4.9)
ALBUMIN/GLOB SERPL: 1.6 {RATIO} (ref 1.2–2.2)
ALP SERPL-CCNC: 91 IU/L (ref 39–117)
ALT SERPL-CCNC: 23 IU/L (ref 0–32)
AST SERPL-CCNC: 37 IU/L (ref 0–40)
BASOPHILS # BLD AUTO: 0 X10E3/UL (ref 0–0.2)
BASOPHILS NFR BLD AUTO: 0 %
BILIRUB SERPL-MCNC: 0.5 MG/DL (ref 0–1.2)
BUN SERPL-MCNC: 11 MG/DL (ref 6–24)
BUN/CREAT SERPL: 15 (ref 9–23)
CALCIUM SERPL-MCNC: 8.9 MG/DL (ref 8.7–10.2)
CHLORIDE SERPL-SCNC: 103 MMOL/L (ref 96–106)
CHOLEST SERPL-MCNC: 202 MG/DL (ref 100–199)
CO2 SERPL-SCNC: 20 MMOL/L (ref 20–29)
CREAT SERPL-MCNC: 0.72 MG/DL (ref 0.57–1)
EOSINOPHIL # BLD AUTO: 0 X10E3/UL (ref 0–0.4)
EOSINOPHIL NFR BLD AUTO: 1 %
ERYTHROCYTE [DISTWIDTH] IN BLOOD BY AUTOMATED COUNT: 13.5 % (ref 11.7–15.4)
EST. AVERAGE GLUCOSE BLD GHB EST-MCNC: 289 MG/DL
GLOBULIN SER CALC-MCNC: 2.6 G/DL (ref 1.5–4.5)
GLUCOSE SERPL-MCNC: 236 MG/DL (ref 65–99)
HBA1C MFR BLD: 11.7 % (ref 4.8–5.6)
HCT VFR BLD AUTO: 40.7 % (ref 34–46.6)
HDLC SERPL-MCNC: 48 MG/DL
HGB BLD-MCNC: 13.6 G/DL (ref 11.1–15.9)
IMM GRANULOCYTES # BLD AUTO: 0 X10E3/UL (ref 0–0.1)
IMM GRANULOCYTES NFR BLD AUTO: 0 %
LDLC SERPL CALC-MCNC: 124 MG/DL (ref 0–99)
LYMPHOCYTES # BLD AUTO: 2.4 X10E3/UL (ref 0.7–3.1)
LYMPHOCYTES NFR BLD AUTO: 29 %
MCH RBC QN AUTO: 29.1 PG (ref 26.6–33)
MCHC RBC AUTO-ENTMCNC: 33.4 G/DL (ref 31.5–35.7)
MCV RBC AUTO: 87 FL (ref 79–97)
MONOCYTES # BLD AUTO: 0.6 X10E3/UL (ref 0.1–0.9)
MONOCYTES NFR BLD AUTO: 7 %
NEUTROPHILS # BLD AUTO: 5.4 X10E3/UL (ref 1.4–7)
NEUTROPHILS NFR BLD AUTO: 63 %
PLATELET # BLD AUTO: 266 X10E3/UL (ref 150–450)
POTASSIUM SERPL-SCNC: 4.7 MMOL/L (ref 3.5–5.2)
PROT SERPL-MCNC: 6.7 G/DL (ref 6–8.5)
RBC # BLD AUTO: 4.67 X10E6/UL (ref 3.77–5.28)
SODIUM SERPL-SCNC: 141 MMOL/L (ref 134–144)
TRIGL SERPL-MCNC: 149 MG/DL (ref 0–149)
TSH SERPL DL<=0.005 MIU/L-ACNC: 1.47 UIU/ML (ref 0.45–4.5)
VLDLC SERPL CALC-MCNC: 30 MG/DL (ref 5–40)
WBC # BLD AUTO: 8.5 X10E3/UL (ref 3.4–10.8)

## 2020-08-18 ENCOUNTER — OFFICE VISIT (OUTPATIENT)
Dept: INTERNAL MEDICINE CLINIC | Age: 50
End: 2020-08-18
Payer: COMMERCIAL

## 2020-08-18 VITALS
DIASTOLIC BLOOD PRESSURE: 68 MMHG | SYSTOLIC BLOOD PRESSURE: 107 MMHG | HEART RATE: 85 BPM | WEIGHT: 249.8 LBS | BODY MASS INDEX: 47.16 KG/M2 | HEIGHT: 61 IN | OXYGEN SATURATION: 98 % | RESPIRATION RATE: 20 BRPM | TEMPERATURE: 98.1 F

## 2020-08-18 DIAGNOSIS — Z17.1 CARCINOMA OF LEFT BREAST, STAGE 2, ESTROGEN RECEPTOR NEGATIVE (HCC): Primary | ICD-10-CM

## 2020-08-18 DIAGNOSIS — I10 ESSENTIAL HYPERTENSION: ICD-10-CM

## 2020-08-18 DIAGNOSIS — E66.01 OBESITY, MORBID (HCC): ICD-10-CM

## 2020-08-18 DIAGNOSIS — G62.0 CHEMOTHERAPY-INDUCED NEUROPATHY (HCC): ICD-10-CM

## 2020-08-18 DIAGNOSIS — E78.5 DYSLIPIDEMIA: ICD-10-CM

## 2020-08-18 DIAGNOSIS — C50.912 CARCINOMA OF LEFT BREAST, STAGE 2, ESTROGEN RECEPTOR NEGATIVE (HCC): Primary | ICD-10-CM

## 2020-08-18 DIAGNOSIS — T45.1X5A CHEMOTHERAPY-INDUCED NEUROPATHY (HCC): ICD-10-CM

## 2020-08-18 DIAGNOSIS — K76.0 HEPATIC STEATOSIS: ICD-10-CM

## 2020-08-18 DIAGNOSIS — E11.9 TYPE 2 DIABETES MELLITUS WITHOUT COMPLICATION, UNSPECIFIED WHETHER LONG TERM INSULIN USE (HCC): ICD-10-CM

## 2020-08-18 DIAGNOSIS — J45.909 UNCOMPLICATED ASTHMA, UNSPECIFIED ASTHMA SEVERITY, UNSPECIFIED WHETHER PERSISTENT: ICD-10-CM

## 2020-08-18 PROCEDURE — 99214 OFFICE O/P EST MOD 30 MIN: CPT | Performed by: INTERNAL MEDICINE

## 2020-08-18 RX ORDER — FLASH GLUCOSE SENSOR
KIT MISCELLANEOUS
Qty: 2 KIT | Refills: 11 | Status: SHIPPED | OUTPATIENT
Start: 2020-08-18 | End: 2021-08-25 | Stop reason: SDUPTHER

## 2020-08-18 RX ORDER — FLASH GLUCOSE SCANNING READER
EACH MISCELLANEOUS
Qty: 1 EACH | Refills: 11 | Status: SHIPPED | OUTPATIENT
Start: 2020-08-18 | End: 2022-01-24

## 2020-08-18 NOTE — PROGRESS NOTES
SPORTS MEDICINE AND PRIMARY CARE  Danis Zhu MD, [address] [address]  Phone:  [phone]  Fax: [phone]       Chief Complaint   Patient presents with    Form Completion   . SUBJECTIVE:    Joana Crandall is a 45 y.o. female Patient returns today with known history of stage II breast carcinoma, diabetes mellitus, chemotherapy induced neuropathy, hepatic steatosis, morbid obesity, asthma, dyslipidemia, primary hypertension, and is seen for evaluation. Current Outpatient Medications   Medication Sig Dispense Refill    flash glucose sensor (FreeStyle Thomas 2 Sensor) kit qid 2 Kit 11    flash glucose scanning reader (FreeStyle Thomas 2 [place]) misc qid 1 Each 11    liraglutide (Victoza 2-Nathaniel) 0.6 mg/0.1 mL (18 mg/3 mL) pnij 1.8 mg by SubCUTAneous route daily. 6 mL 11    dicyclomine (BentyL) 10 mg capsule Take 2 Caps by mouth four (4) times daily. 120 Cap 11    DULoxetine (CYMBALTA) 20 mg capsule Take 1 Cap by mouth daily. 30 Cap 5    insulin lispro (HUMALOG KWIKPEN INSULIN) 100 unit/mL kwikpen 12 Units by SubCUTAneous route Before breakfast, lunch, and dinner. 15 mL 11    insulin glargine (LANTUS SOLOSTAR U-100 INSULIN) 100 unit/mL (3 mL) inpn 30 Units by SubCUTAneous route nightly. ADMINISTER 25 UNITS UNDER THE SKIN EVERY NIGHT 15 mL 11    triamcinolone acetonide (KENALOG) 0.1 % topical cream Apply  to affected area two (2) times a day. 15 g 11    pitavastatin calcium (LIVALO) 1 mg tab tablet Take 1 Tab by mouth daily. 30 Tab 11    albuterol (PROVENTIL HFA, VENTOLIN HFA, PROAIR HFA) 90 mcg/actuation inhaler Take 2 Puffs by inhalation every four (4) hours as needed for Wheezing. 1 Inhaler 11    albuterol (PROVENTIL HFA, VENTOLIN HFA, PROAIR HFA) 90 mcg/actuation inhaler Take 2 Puffs by inhalation every four (4) hours as needed for Wheezing.  1 Inhaler 11    budesonide-formoterol (SYMBICORT) 160-4.5 mcg/actuation HFAA Take 2 Puffs by inhalation two (2) times a day. 1 Inhaler 11    sodium chloride (OCEAN) 0.65 % nasal squeeze bottle 0.1 mL by Both Nostrils route four (4) times daily. 45 mL 11    glucose blood VI test strips (FREESTYLE LITE STRIPS) strip USE TO TEST BLOOD SUGAR THREE TIMES DAILY 100 Strip 11    KELLY PEN NEEDLE 32 gauge x 5/32\" ndle USE AS DIRECTED TWICE DAILY 100 Pen Needle 11    hydrocortisone (ANUSOL-HC) 25 mg supp Insert 1 Suppository into rectum every twelve (12) hours. 12 Each 11    witch hazel-glycerin (TUCKS) 12.5-50 % pad 1 Pad by PeriANAL route as needed for Pain. 100 Pad 11    FREESTYLE LANCETS 28 gauge misc USE TO TEST BLOOD SUGAR TID  11    Blood-Glucose Meter (FREESTYLE LITE METER) monitoring kit Use to test blood sugar three times daily. Dx.e119 1 Kit 0    Lancets misc Use to test blood sugar three times daily. dx.e11.9 100 Each 11    ONETOUCH ULTRA TEST strip TEST THREE TIMES DAILY 100 Strip 11    ONE TOUCH DELICA 33 gauge misc TEST THREE TIMES DAILY 100 Lancet 11    albuterol (PROVENTIL VENTOLIN) 2.5 mg /3 mL (0.083 %) nebulizer solution every four (4) hours as needed. Past Medical History:   Diagnosis Date    Abdominal pain 11/05/2019    ct vcu - hepatomegaly with diffuse hepatic steatosis, diverticulosis,sma;; supraumbilical ventral hernia containing fat    Arthritis     Asthma     Cancer of left breast ([facility] 08/01/2016    BREAST left lumpectomy 10/16 with b/l reduction, Adjuvant chemotherapy with TAC x 4,stopped due to peripheral neuropathy,Radiation 3/17 at [address] Chronic pain     lower extremities    Diabetes ([facility] 2012    Diverticulosis 11/05/2019    ct    Dyslipidemia     Hepatic steatosis 11/05/2019    ct    IBS (irritable bowel syndrome)     Keloid 08/01/2017    Excision of keloid scar approximately 4 cm -Chandra Madison MD     Nausea & vomiting     Noncompliance     Obesity     Plantar fasciitis, right 09/25/2019    Psychiatric disorder 02/12/2018    Normal MRI of the brain.     Radiation therapy complication     left    S/P breast biopsy, left 16    S/P colonoscopy 10/24/2019    mcv polyp repeat 5 yrs    Sinusitis     Stress at home     Ventral hernia 2019     Past Surgical History:   Procedure Laterality Date    HX BREAST LUMPECTOMY Left 10/13/2016    LEFT BREAST REDUCTION LUMPECTOMY, LEFT SENTINEL NODE BIOPSY, LEFT BRACKETED NEEDLE LOCALIZATION, LEFT BREAST RECONSTRUCTION performed by Parker Teresa MD at [address] HX BREAST RECONSTRUCTION Bilateral 10/13/2016    BREAST REDUCTION performed by Soco Mccarthy MD at [address] HX BREAST REDUCTION Bilateral 2016    HX  SECTION  1992.8439    X2    HX GYN  2002    ABLATION    HX HERNIA REPAIR       umbilical as infant    HX HYSTERECTOMY  2008    rosangela bso    HX ORTHOPAEDIC Left     left dequervains    HX VASCULAR ACCESS Right 2016,2017    insertiona and removal of power port     Allergies   Allergen Reactions    Atorvastatin Other (comments)     Joint pains in arms    Codeine Diarrhea    Pravastatin Myalgia     Terrible leg aching.      Theophylline Diarrhea         REVIEW OF SYSTEMS:  General: negative for - chills or fever  ENT: negative for - headaches, nasal congestion or tinnitus  Respiratory: negative for - cough, hemoptysis, shortness of breath or wheezing  Cardiovascular : negative for - chest pain, edema, palpitations or shortness of breath  Gastrointestinal: negative for - abdominal pain, blood in stools, heartburn or nausea/vomiting  Genito-Urinary: no dysuria, trouble voiding, or hematuria  Musculoskeletal: negative for - gait disturbance, joint pain, joint stiffness or joint swelling  Neurological: no TIA or stroke symptoms  Hematologic: no bruises, no bleeding, no swollen glands  Integument: no lumps, mole changes, nail changes or rash  Endocrine: no malaise/lethargy or unexpected weight changes      Social History     Socioeconomic History    Marital status: SINGLE     Spouse name: Not on file    Number of children: Not on file    Years of education: Not on file    Highest education level: Not on file   Tobacco Use    Smoking status: Never Smoker    Smokeless tobacco: Never Used   Substance and Sexual Activity    Alcohol use: No    Drug use: No    Sexual activity: Yes     Partners: Male     Birth control/protection: None   Social History Narrative         Medical History: Recurrent depressive d/o 2012Muscloskeletal back pain 2012Carpal tunnel syndrome 2012Asthma 1979Obesity 2012IBS ([facility]) 2012diabetes mellitus type 2 2013    Gyn History: Last mammogram date 2013. Surgical History:  x2, RA 10/02    Hospitalization/Major Diagnostic Procedure: asthma childhood        Family History: Mother: alive 58 yrs CAD, HTN, depression Father: alive 61 yrs Brother(s): 40 yrs mental issues Son(s) 33 yo alive and well,    bipolar and schizophrenia on SSI kelley [id number] was incarcerated since [place] 15, 2015  tried to miriam a friend with others. He will be out in . He tried to kill his mother. .    Social History: Alcohol Use Patient does not use alcohol. Smoking Status Patient is a never smoker. Marital Status: Single. Lives w ith: alone    son. Occupation/W ork: employed full time sw [employer] at [employer]. Education/School: has highschool diploma, college. Zoroastrian: 5th [employer].     Family History   Problem Relation Age of Onset    Heart Disease Mother     Hypertension Mother     Heart Disease Father     Seizures Sister     Anesth Problems Neg Hx        OBJECTIVE:    Visit Vitals  /68   Pulse 85   Temp 98.1 °F (36.7 °C) (Oral)   Resp 20   Ht 5' 1\" (1.549 m)   Wt 249 lb 12.8 oz (113.3 kg)   SpO2 98%   BMI 47.20 kg/m²     CONSTITUTIONAL: well , well nourished, appears age appropriate  EYES: perrla, eom intact  ENMT:moist mucous membranes, pharynx clear  NECK: supple.  Thyroid normal  RESPIRATORY: Chest: clear bilaterally   CARDIOVASCULAR: Heart: regular rate and rhythm  GASTROINTESTINAL: Abdomen: soft, bowel sounds active  HEMATOLOGIC: no pathological lymph nodes palpated  MUSCULOSKELETAL: Extremities: no edema, pulse 1+   INTEGUMENT: No unusual rashes or suspicious skin lesions noted. Nails appear normal.  NEUROLOGIC: non-focal exam   MENTAL STATUS: alert and oriented, appropriate affect           ASSESSMENT:  1. Carcinoma of left breast, stage 2, estrogen receptor negative ([facility]    2. Type 2 diabetes mellitus without complication, unspecified whether long term insulin use ([address]    3. Chemotherapy-induced neuropathy ([facility]    4. Hepatic steatosis    5. Obesity, morbid ([address]    6. Uncomplicated asthma, unspecified asthma severity, unspecified whether persistent    7. Dyslipidemia    8. Essential hypertension      Her FMLA forms are completed, the same as they were last year. It is unfortunate she does not like to stick her finger and therefore we have no blood sugar readings. Reluctantly she agrees to [facility], which will then allow her to have multiple readings. We ask her to do readings four times a day and send us the results. Send it to [facility] and we will help her adjust the insulin before we see her again. She agrees to this. Obesity remains a concern and we encourage a heart healthy, weight reducing diet. She is having some cramp-like feelings, although she has had a RA and BSO. She is going to see Dr. Josh Maurer for an opinion. Blood pressure control is at goal.    For the neuropathy, Cymbalta is helping, so no adjustment is needed. She is taking Pitavastatin because of multiple intolerances to statins, which seems to be working. She will be back to see us in two months. Hopefully she will bring us some readings. I have discussed the diagnosis with the patient and the intended plan as seen in the  orders above. The patient understands and agees with the plan.   The patient has received an after visit summary and questions were answered concerning  future plans  Patient labs and/or xrays were reviewed  Past records were reviewed. PLAN:  .  Orders Placed This Encounter    flash glucose sensor (FreeStyle Thomas 2 Sensor) kit    flash glucose scanning reader (FreeStyle Thomas 2 [place]) misc       Follow-up and Dispositions    · Return in about 2 months (around 10/18/2020). ATTENTION:   This medical record was transcribed using an electronic medical records system. Although proofread, it may and can contain electronic and spelling errors. Other human spelling and other errors may be present. Corrections may be executed at a later time. Please feel free to contact us for any clarifications as needed.

## 2020-08-18 NOTE — PROGRESS NOTES
1. Have you been to the ER, urgent care clinic since your last visit? Hospitalized since your last visit? No    2. Have you seen or consulted any other health care providers outside of the [address] [place] [address] since your last visit? Include any pap smears or colon screening.  No     Form completion

## 2020-10-05 RX ORDER — ALBUTEROL SULFATE 90 UG/1
AEROSOL, METERED RESPIRATORY (INHALATION)
Qty: 18 G | Refills: 11 | Status: SHIPPED | OUTPATIENT
Start: 2020-10-05 | End: 2021-09-13

## 2020-10-14 RX ORDER — BUDESONIDE AND FORMOTEROL FUMARATE DIHYDRATE 160; 4.5 UG/1; UG/1
AEROSOL RESPIRATORY (INHALATION)
Qty: 1 INHALER | Refills: 11 | Status: SHIPPED | OUTPATIENT
Start: 2020-10-14

## 2020-10-16 RX ORDER — INSULIN GLARGINE 100 [IU]/ML
INJECTION, SOLUTION SUBCUTANEOUS
Qty: 15 ML | Refills: 11 | Status: SHIPPED | OUTPATIENT
Start: 2020-10-16 | End: 2020-12-17

## 2020-11-12 RX ORDER — PEN NEEDLE, DIABETIC 31 GX3/16"
NEEDLE, DISPOSABLE MISCELLANEOUS
Qty: 100 PEN NEEDLE | Refills: 11 | Status: SHIPPED | OUTPATIENT
Start: 2020-11-12 | End: 2021-12-30

## 2020-11-12 RX ORDER — DICLOFENAC SODIUM 10 MG/G
GEL TOPICAL 4 TIMES DAILY
Qty: 100 G | Refills: 11 | Status: SHIPPED | OUTPATIENT
Start: 2020-11-12 | End: 2021-04-06 | Stop reason: SDUPTHER

## 2020-12-05 RX ORDER — INSULIN LISPRO 100 [IU]/ML
INJECTION, SOLUTION INTRAVENOUS; SUBCUTANEOUS
Qty: 15 ML | Refills: 11 | Status: SHIPPED | OUTPATIENT
Start: 2020-12-05 | End: 2020-12-17

## 2020-12-17 ENCOUNTER — OFFICE VISIT (OUTPATIENT)
Dept: INTERNAL MEDICINE CLINIC | Age: 50
End: 2020-12-17
Payer: COMMERCIAL

## 2020-12-17 VITALS
HEIGHT: 61 IN | SYSTOLIC BLOOD PRESSURE: 153 MMHG | OXYGEN SATURATION: 97 % | BODY MASS INDEX: 47.01 KG/M2 | TEMPERATURE: 98.3 F | HEART RATE: 105 BPM | DIASTOLIC BLOOD PRESSURE: 79 MMHG | RESPIRATION RATE: 20 BRPM | WEIGHT: 249 LBS

## 2020-12-17 DIAGNOSIS — T45.1X5A CHEMOTHERAPY-INDUCED NEUROPATHY (HCC): ICD-10-CM

## 2020-12-17 DIAGNOSIS — I10 ESSENTIAL HYPERTENSION: ICD-10-CM

## 2020-12-17 DIAGNOSIS — G62.0 CHEMOTHERAPY-INDUCED NEUROPATHY (HCC): ICD-10-CM

## 2020-12-17 DIAGNOSIS — E11.9 TYPE 2 DIABETES MELLITUS WITHOUT COMPLICATION, UNSPECIFIED WHETHER LONG TERM INSULIN USE (HCC): Primary | ICD-10-CM

## 2020-12-17 DIAGNOSIS — Z17.1 CARCINOMA OF LEFT BREAST, STAGE 2, ESTROGEN RECEPTOR NEGATIVE (HCC): ICD-10-CM

## 2020-12-17 DIAGNOSIS — K43.9 VENTRAL HERNIA WITHOUT OBSTRUCTION OR GANGRENE: ICD-10-CM

## 2020-12-17 DIAGNOSIS — E78.5 DYSLIPIDEMIA: ICD-10-CM

## 2020-12-17 DIAGNOSIS — K76.0 HEPATIC STEATOSIS: ICD-10-CM

## 2020-12-17 DIAGNOSIS — E66.01 OBESITY, MORBID (HCC): ICD-10-CM

## 2020-12-17 DIAGNOSIS — C50.912 CARCINOMA OF LEFT BREAST, STAGE 2, ESTROGEN RECEPTOR NEGATIVE (HCC): ICD-10-CM

## 2020-12-17 PROCEDURE — 36415 COLL VENOUS BLD VENIPUNCTURE: CPT | Performed by: INTERNAL MEDICINE

## 2020-12-17 PROCEDURE — 99215 OFFICE O/P EST HI 40 MIN: CPT | Performed by: INTERNAL MEDICINE

## 2020-12-17 RX ORDER — INSULIN GLARGINE 100 [IU]/ML
40 INJECTION, SOLUTION SUBCUTANEOUS
Qty: 15 ML | Refills: 11
Start: 2020-12-17 | End: 2021-03-02 | Stop reason: SDUPTHER

## 2020-12-17 RX ORDER — INSULIN LISPRO 100 [IU]/ML
16 INJECTION, SOLUTION INTRAVENOUS; SUBCUTANEOUS
Qty: 15 ML | Refills: 11
Start: 2020-12-17 | End: 2021-03-02
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
## 2020-12-17 NOTE — PROGRESS NOTES
1. Have you been to the ER, urgent care clinic since your last visit? Hospitalized since your last visit? No    2. Have you seen or consulted any other health care providers outside of the [address] [place] [address] since your last visit? Include any pap smears or colon screening.  No    Form completion  fall

## 2020-12-18 LAB
EST. AVERAGE GLUCOSE BLD GHB EST-MCNC: 263 MG/DL
HBA1C MFR BLD: 10.8 % (ref 4.8–5.6)

## 2021-03-02 ENCOUNTER — VIRTUAL VISIT (OUTPATIENT)
Dept: INTERNAL MEDICINE CLINIC | Age: 51
End: 2021-03-02
Payer: COMMERCIAL

## 2021-03-02 DIAGNOSIS — E78.5 DYSLIPIDEMIA: ICD-10-CM

## 2021-03-02 DIAGNOSIS — C50.919 MALIGNANT NEOPLASM OF BREAST, STAGE 1, ESTROGEN RECEPTOR NEGATIVE, UNSPECIFIED LATERALITY (HCC): ICD-10-CM

## 2021-03-02 DIAGNOSIS — Z17.1 MALIGNANT NEOPLASM OF BREAST, STAGE 1, ESTROGEN RECEPTOR NEGATIVE, UNSPECIFIED LATERALITY (HCC): ICD-10-CM

## 2021-03-02 DIAGNOSIS — I10 ESSENTIAL HYPERTENSION: ICD-10-CM

## 2021-03-02 DIAGNOSIS — J45.41 MODERATE PERSISTENT ASTHMA WITH ACUTE EXACERBATION: ICD-10-CM

## 2021-03-02 DIAGNOSIS — U07.1 COVID-19 VIRUS INFECTION: Primary | ICD-10-CM

## 2021-03-02 DIAGNOSIS — E11.9 TYPE 2 DIABETES MELLITUS WITHOUT COMPLICATION, UNSPECIFIED WHETHER LONG TERM INSULIN USE (HCC): ICD-10-CM

## 2021-03-02 DIAGNOSIS — E66.01 OBESITY, MORBID (HCC): ICD-10-CM

## 2021-03-02 DIAGNOSIS — K76.0 HEPATIC STEATOSIS: ICD-10-CM

## 2021-03-02 PROCEDURE — 99214 OFFICE O/P EST MOD 30 MIN: CPT | Performed by: INTERNAL MEDICINE

## 2021-03-02 RX ORDER — INSULIN GLARGINE 100 [IU]/ML
54 INJECTION, SOLUTION SUBCUTANEOUS
Qty: 15 ML | Refills: 11 | Status: SHIPPED | OUTPATIENT
Start: 2021-03-02 | End: 2021-05-17

## 2021-03-02 RX ORDER — INSULIN LISPRO 100 [IU]/ML
20 INJECTION, SOLUTION INTRAVENOUS; SUBCUTANEOUS
Qty: 15 ML | Refills: 11 | Status: SHIPPED | OUTPATIENT
Start: 2021-03-02

## 2021-03-02 RX ORDER — IPRATROPIUM BROMIDE AND ALBUTEROL SULFATE 2.5; .5 MG/3ML; MG/3ML
3 SOLUTION RESPIRATORY (INHALATION)
Qty: 150 NEBULE | Refills: 11 | Status: SHIPPED | OUTPATIENT
Start: 2021-03-02 | End: 2022-03-30

## 2021-03-02 NOTE — PROGRESS NOTES
Tiff Schwarz is a 45 y.o. female who was seen by synchronous (real-time) audio-video technology on 3/2/2021 for Concern For COVID-19 (Coronavirus)        Assessment & Plan:   Diagnoses and all orders for this visit:    1. COVID-19 virus infection Covid viral infection that is now lingering a flare in her underlying asthma as manifest by frequent cough even during our interview. Cough is nonproductive. There is no further chills or fever. 2. Type 2 diabetes mellitus without complication, unspecified whether long term insulin use (HCC) blood sugar control has been lacking fasting blood sugar even before Covid was in the 200s and this morning it was 279 random blood sugars in the 170s to 200s no hypo or hyperglycemic symptoms. We increase the Lantus to 54 units at bedtime and NovoLog to 20 units before meals goal is fasting blood sugar less than 130 mealtime blood sugar less than 140 she will send her readings via [employer] and we will titrate until goal is met    3. Malignant neoplasm of breast, stage 1, estrogen receptor negative, unspecified laterality ([address]    4. Hepatic steatosis encourage dietary manipulation    5. Obesity, morbid ([address] encourage dietary weight loss    6. Essential hypertension we will assess blood sugar control at her next visit    7. Dyslipidemia he linh [place]ival[place] and will check that we will see her again. 8. Moderate persistent asthma with acute exacerbation she had cough persistently throughout the interview. With some audible wheezes. We will add jet nebs to her current regime and if that is unsuccessful reluctantly will add steroids    Other orders  -     insulin glargine (Lantus Solostar U-100 Insulin) 100 unit/mL (3 mL) inpn; 54 Units by SubCUTAneous route nightly.   -     insulin lispro (HumaLOG KwikPen Insulin) 100 unit/mL kwikpen; 20 Units by SubCUTAneous route Before breakfast, lunch, and dinner.  -     albuterol-ipratropium (DUO-NEB) 2.5 mg-0.5 mg/3 ml nebu; 3 mL by Nebulization route every four (4) hours as needed for Wheezing. We advised her that we will cover her for time off from February 12 and this to her next visit with us on March 10. I am not sure when she is ready to go back to work. She agrees with the plan and will let her employer know        Subjective:   Patient seen as a telehealth visit at her home. She has a known history of asthma, type 2 diabetes, stage II carcinoma of the breast, morbid obesity, and is seen for evaluation. On February 12 she developed fever malaise not feeling well headache on February 13 she was found to be Covid positive went to the emergency room at [address] [place] [address] and was found to have flu and her lungs no pneumonia and a pulse between 110 and 120 and O2 sat of 90% it was discharged home. She is feeling better she has been checking her oxygen saturation but has a persistent cough. Patient seen for evaluation she also notes malaise    Prior to Admission medications    Medication Sig Start Date End Date Taking? Authorizing Provider   insulin glargine (Lantus Solostar U-100 Insulin) 100 unit/mL (3 mL) inpn 54 Units by SubCUTAneous route nightly. 3/2/21  Yes Nabor Suarez MD   insulin lispro (HumaLOG KwikPen Insulin) 100 unit/mL kwikpen 20 Units by SubCUTAneous route Before breakfast, lunch, and dinner. 3/2/21  Yes Nabor Suarez MD   albuterol-ipratropium (DUO-NEB) 2.5 mg-0.5 mg/3 ml nebu 3 mL by Nebulization route every four (4) hours as needed for Wheezing. 3/2/21  Yes Nabor Suarez MD   Insulin Needles, Disposable, (Mary Pen Needle) 32 gauge x 5/32\" ndle USE AS DIRECTED TWICE DAILY. dx.e11.9 11/12/20  Yes Nabor Suarez MD   diclofenac (VOLTAREN) 1 % gel Apply  to affected area four (4) times daily.  11/12/20  Yes Nabor Suarez MD   Symbicort 160-4.5 mcg/actuation HFAA INHALE 2 PUFFS BY MOUTH TWICE DAILY 10/14/20  Yes Nabor Suarez MD   Ventolin HFA 90 mcg/actuation inhaler INHALE 2 PUFFS BY MOUTH EVERY 4 HOURS AS NEEDED FOR WHEEZING 10/5/20  Yes Alyx Younger MD   flash glucose sensor (FreeStyle Thomas 2 Sensor) kit qid 8/18/20  Yes Alyx Younger MD   flash glucose scanning reader (FreeStyle Thomas 2 Doon) misc qid 8/18/20  Yes Alyx Younger MD   liraglutide (Victoza 2-Nathaniel) 0.6 mg/0.1 mL (18 mg/3 mL) pnij 1.8 mg by SubCUTAneous route daily. 6/29/20  Yes Alyx Younger MD   dicyclomine (BentyL) 10 mg capsule Take 2 Caps by mouth four (4) times daily. 6/23/20  Yes Alyx Younger MD   DULoxetine (CYMBALTA) 20 mg capsule Take 1 Cap by mouth daily. 6/23/20  Yes Alyx Younger MD   triamcinolone acetonide (KENALOG) 0.1 % topical cream Apply  to affected area two (2) times a day. 12/6/19  Yes Alyx Younger MD   pitavastatin calcium (LIVALO) 1 mg tab tablet Take 1 Tab by mouth daily. 10/17/19  Yes Alyx Younger MD   sodium chloride (OCEAN) 0.65 % nasal squeeze bottle 0.1 mL by Both Nostrils route four (4) times daily. 1/19/19  Yes Alyx Younger MD   glucose blood VI test strips (FREESTYLE LITE STRIPS) strip USE TO TEST BLOOD SUGAR THREE TIMES DAILY 12/28/18  Yes Alyx Younger MD   FREESTYLE LANCETS 28 gauge [facility] USE TO TEST BLOOD SUGAR TID 1/11/18  Yes Provider, Historical   Blood-Glucose Meter (FREESTYLE LITE METER) monitoring kit Use to test blood sugar three times daily. Dx.e119 12/14/17  Yes Alyx Younger MD   albuterol (PROVENTIL VENTOLIN) 2.5 mg /3 mL (0.083 %) nebulizer solution every four (4) hours as needed.  10/16/16  Yes Provider, Historical     Patient Active Problem List   Diagnosis Code    Diabetes ([address] E11.9    Sinusitis J32.9    IBS (irritable bowel syndrome) K58.9    Stress at home F43.9    Noncompliance Z91.19    Dyslipidemia E78.5    HTN (hypertension) I10    ACP (advance care planning) Z71.89    S/P lumpectomy, left breast Z98.890    Stage 2 carcinoma of breast, ER- ([address] C50.919, Z72.7    Uncomplicated asthma A04.486    Chemotherapy induced diarrhea K52.1, T45.1X5A    Chemotherapy follow-up examination Z09    Constipation K59.00    Port catheter in place Z95.828    Chemotherapy-induced neuropathy ([address] G62.0, T45.1X5A    Joint stiffness M25.60    Cancer of left breast ([address] C50.912    Obesity, morbid ([address] E66.01    History of lumpectomy of left breast Z98.890    Psychiatric disorder F99    Plantar fasciitis, right M72.2    Abdominal pain R10.9    Diverticulosis K57.90    Hepatic steatosis K76.0    Ventral hernia K43.9    S/P colonoscopy Z98.890    COVID-19 virus infection U07.1    Asthma J45.909     Patient Active Problem List    Diagnosis Date Noted    Asthma     COVID-19 virus infection 02/13/2021    Abdominal pain 11/05/2019    Diverticulosis 11/05/2019    Hepatic steatosis 11/05/2019    Ventral hernia 11/05/2019    S/P colonoscopy 10/24/2019    Plantar fasciitis, right 09/25/2019    Psychiatric disorder 02/12/2018    Obesity, morbid ([address] 01/31/2018    History of lumpectomy of left breast 01/31/2018    Cancer of left breast ([address]     Joint stiffness 03/21/2017    Port catheter in place 02/23/2017    Chemotherapy-induced neuropathy ([address] 02/23/2017    Chemotherapy follow-up examination 01/31/2017    Constipation 01/31/2017    Chemotherapy induced diarrhea 12/20/2016    S/P lumpectomy, left breast 10/20/2016    Stage 2 carcinoma of breast, ER- ([address] 77/51/6493    Uncomplicated asthma 65/77/9646    ACP (advance care planning) 02/26/2016    Dyslipidemia     HTN (hypertension)     Noncompliance     IBS (irritable bowel syndrome)     Stress at home     Diabetes ([address]     Sinusitis      Current Outpatient Medications   Medication Sig Dispense Refill    insulin glargine (Lantus Solostar U-100 Insulin) 100 unit/mL (3 mL) inpn 54 Units by SubCUTAneous route nightly.  15 mL 11    insulin lispro (HumaLOG KwikPen Insulin) 100 unit/mL kwikpen 20 Units by SubCUTAneous route Before breakfast, lunch, and dinner. 15 mL 11    albuterol-ipratropium (DUO-NEB) 2.5 mg-0.5 mg/3 ml nebu 3 mL by Nebulization route every four (4) hours as needed for Wheezing. 150 Nebule 11    Insulin Needles, Disposable, (Mary Pen Needle) 32 gauge x 5/32\" ndle USE AS DIRECTED TWICE DAILY. dx.e11.9 100 Pen Needle 11    diclofenac (VOLTAREN) 1 % gel Apply  to affected area four (4) times daily. 100 g 11    Symbicort 160-4.5 mcg/actuation HFAA INHALE 2 PUFFS BY MOUTH TWICE DAILY 1 Inhaler 11    Ventolin HFA 90 mcg/actuation inhaler INHALE 2 PUFFS BY MOUTH EVERY 4 HOURS AS NEEDED FOR WHEEZING 18 g 11    flash glucose sensor (FreeStyle Thomas 2 Sensor) kit qid 2 Kit 11    flash glucose scanning reader (FreeStyle Thomas 2 Diamond) misc qid 1 Each 11    liraglutide (Victoza 2-Nathaniel) 0.6 mg/0.1 mL (18 mg/3 mL) pnij 1.8 mg by SubCUTAneous route daily. 6 mL 11    dicyclomine (BentyL) 10 mg capsule Take 2 Caps by mouth four (4) times daily. 120 Cap 11    DULoxetine (CYMBALTA) 20 mg capsule Take 1 Cap by mouth daily. 30 Cap 5    triamcinolone acetonide (KENALOG) 0.1 % topical cream Apply  to affected area two (2) times a day. 15 g 11    pitavastatin calcium (LIVALO) 1 mg tab tablet Take 1 Tab by mouth daily. 30 Tab 11    sodium chloride (OCEAN) 0.65 % nasal squeeze bottle 0.1 mL by Both Nostrils route four (4) times daily. 45 mL 11    glucose blood VI test strips (FREESTYLE LITE STRIPS) strip USE TO TEST BLOOD SUGAR THREE TIMES DAILY 100 Strip 11    FREESTYLE LANCETS 28 gauge misc USE TO TEST BLOOD SUGAR TID  11    Blood-Glucose Meter (FREESTYLE LITE METER) monitoring kit Use to test blood sugar three times daily. Dx.e119 1 Kit 0    albuterol (PROVENTIL VENTOLIN) 2.5 mg /3 mL (0.083 %) nebulizer solution every four (4) hours as needed.        Allergies   Allergen Reactions    Atorvastatin Other (comments)     Joint pains in arms    Codeine Diarrhea    Pravastatin Myalgia Terrible leg aching.  Theophylline Diarrhea     Past Medical History:   Diagnosis Date    Abdominal pain 2019    ct vcu - hepatomegaly with diffuse hepatic steatosis, diverticulosis,sma;; supraumbilical ventral hernia containing fat    Arthritis     Asthma     Cancer of left breast ([facility] 2016    BREAST left lumpectomy 10/16 with b/l reduction, Adjuvant chemotherapy with TAC x 4,stopped due to peripheral neuropathy,Radiation 3/17 at [address] Chronic pain     lower extremities    COVID-19 virus infection 2021    Diabetes ([facility] 2012    Diverticulosis 2019    ct    Dyslipidemia     Hepatic steatosis 2019    ct    IBS (irritable bowel syndrome)     Keloid 2017    Excision of keloid scar approximately 4 cm -Gretta Simms MD     Nausea & vomiting     Noncompliance     Obesity     Plantar fasciitis, right 2019    Psychiatric disorder 2018    Normal MRI of the brain.     Radiation therapy complication 6969    left    S/P breast biopsy, left 16    S/P colonoscopy 10/24/2019    mcv polyp repeat 5 yrs    Sinusitis     Stress at home     Ventral hernia 2019     Past Surgical History:   Procedure Laterality Date    HX BREAST LUMPECTOMY Left 10/13/2016    LEFT BREAST REDUCTION LUMPECTOMY, LEFT SENTINEL NODE BIOPSY, LEFT BRACKETED NEEDLE LOCALIZATION, LEFT BREAST RECONSTRUCTION performed by Gretta Simms MD at [address] HX BREAST RECONSTRUCTION Bilateral 10/13/2016    BREAST REDUCTION performed by Jacobo Greenfield MD at [address] HX BREAST REDUCTION Bilateral     HX  SECTION  0289.6334    X2    HX GYN  2002    ABLATION    HX HERNIA REPAIR       umbilical as infant    HX HYSTERECTOMY      rosangela bso    HX ORTHOPAEDIC Left 2006    left dequervains    HX VASCULAR ACCESS Right 2016,2017    insertiona and removal of power port     Family History   Problem Relation Age of Onset    Heart Disease Mother     Hypertension Mother     Heart Disease Father     Seizures Sister     Anesth Problems Neg Hx      Social History     Tobacco Use    Smoking status: Never Smoker    Smokeless tobacco: Never Used   Substance Use Topics    Alcohol use: No         REVIEW OF SYSTEMS as noted below except that noted in subjective:  General: negative for - chills or fever  ENT: negative for - headaches, nasal congestion or tinnitus  Respiratory: negative for - cough, hemoptysis, shortness of breath or wheezing  Cardiovascular : negative for - chest pain, edema, palpitations or shortness of breath  Gastrointestinal: negative for - abdominal pain, blood in stools, heartburn or nausea/vomiting  Genito-Urinary: no dysuria, trouble voiding, or hematuria  Musculoskeletal: negative for - gait disturbance, joint pain, joint stiffness or joint swelling  Neurological: no TIA or stroke symptoms  Hematologic: no bruises, no bleeding, no swollen glands  Integument: no lumps, mole changes, nail changes or rash  Endocrine:no malaise/lethargy or unexpected weight changes        Objective:   No flowsheet data found.      [INSTRUCTIONS:  \"[x]\" Indicates a positive item  \"[]\" Indicates a negative item  -- DELETE ALL ITEMS NOT EXAMINED]    Constitutional: [x] Appears well-developed and well-nourished [x] No apparent distress      [] Abnormal -     Mental status: [x] Alert and awake  [x] Oriented to person/place/time [x] Able to follow commands    [] Abnormal -     Eyes:   EOM    [x]  Normal    [] Abnormal -   Sclera  [x]  Normal    [] Abnormal -          Discharge [x]  None visible   [] Abnormal -     HENT: [x] Normocephalic, atraumatic  [] Abnormal -   [x] Mouth/Throat: Mucous membranes are moist    External Ears [x] Normal  [] Abnormal -    Neck: [x] No visualized mass [] Abnormal -     Pulmonary/Chest: [x] Respiratory effort normal   [x] No visualized signs of difficulty breathing or respiratory distress        [] Abnormal - Musculoskeletal:   [x] Normal gait with no signs of ataxia         [x] Normal range of motion of neck        [] Abnormal -     Neurological:        [x] No Facial Asymmetry (Cranial nerve 7 motor function) (limited exam due to video visit)          [x] No gaze palsy        [] Abnormal -          Skin:        [x] No significant exanthematous lesions or discoloration noted on facial skin         [] Abnormal -            Psychiatric:       [x] Normal Affect [] Abnormal -        [x] No Hallucinations    Other pertinent observable physical exam findings:-        We discussed the expected course, resolution and complications of the diagnosis(es) in detail. Medication risks, benefits, costs, interactions, and alternatives were discussed as indicated. I advised her to contact the office if her condition worsens, changes or fails to improve as anticipated. She expressed understanding with the diagnosis(es) and plan. Iain Avendano, was evaluated through a synchronous (real-time) audio-video encounter. The patient (or guardian if applicable) is aware that this is a billable service. Verbal consent to proceed has been obtained within the past 12 months. The visit was conducted pursuant to the emergency declaration under the [address] [place] [address], [address] [place] [address] authority and the [employer] General Act. Patient identification was verified, and a caregiver was present when appropriate. The patient was located in a state where the provider was credentialed to provide care. Alphonso Smith MD    Please note that portions of this dictation may have been recorded with voice recognition software. Some unanticipated grammatical, syntax, homophones, and other interpretive errors are inadvertently transcribed by the computer software. An attempt at proof reading has been made to minimize errors and omissions. Please disregard these errors. Thank you.

## 2021-03-02 NOTE — PROGRESS NOTES
1. Have you been to the ER, urgent care clinic since your last visit? Hospitalized since your last visit? No    2. Have you seen or consulted any other health care providers outside of the [address] [place] [address] since your last visit? Include any pap smears or colon screening.  No    Wants to discuss covid and returning to work

## 2021-03-10 ENCOUNTER — VIRTUAL VISIT (OUTPATIENT)
Dept: INTERNAL MEDICINE CLINIC | Age: 51
End: 2021-03-10
Payer: COMMERCIAL

## 2021-03-10 DIAGNOSIS — Z17.1 CARCINOMA OF LEFT BREAST, STAGE 2, ESTROGEN RECEPTOR NEGATIVE (HCC): ICD-10-CM

## 2021-03-10 DIAGNOSIS — E11.9 TYPE 2 DIABETES MELLITUS WITHOUT COMPLICATION, UNSPECIFIED WHETHER LONG TERM INSULIN USE (HCC): ICD-10-CM

## 2021-03-10 DIAGNOSIS — U07.1 COVID-19 VIRUS INFECTION: Primary | ICD-10-CM

## 2021-03-10 DIAGNOSIS — J45.41 MODERATE PERSISTENT ASTHMA WITH ACUTE EXACERBATION: ICD-10-CM

## 2021-03-10 DIAGNOSIS — C50.912 CARCINOMA OF LEFT BREAST, STAGE 2, ESTROGEN RECEPTOR NEGATIVE (HCC): ICD-10-CM

## 2021-03-10 DIAGNOSIS — I10 ESSENTIAL HYPERTENSION: ICD-10-CM

## 2021-03-10 PROCEDURE — 99213 OFFICE O/P EST LOW 20 MIN: CPT | Performed by: INTERNAL MEDICINE

## 2021-03-10 NOTE — PROGRESS NOTES
Danna Estevez is a 45 y.o. female who was seen by synchronous (real-time) audio-video technology on 3/10/2021 for Concern For COVID-19 (Coronavirus)        Assessment & Plan:   Diagnoses and all orders for this visit:    1. COVID-19 virus infection she had a Covid viral infection to be positive on February 13 so she is no longer effective there is no reason medically she cannot return to work. 2. Moderate persistent asthma with acute exacerbation however she had exacerbation of her asthma when she does she not go back to work until the asthma subsided. It seems to have now subsided. 3. Type 2 diabetes mellitus without complication, unspecified whether long term insulin use (HCC) blood sugar control is improved but usually in the 170s however in the morning this morning was 139 which she thinks was a fluke. We will increase the Lantus to 58 units and continue the rapid acting insulin at his current dose. 4. Carcinoma of left breast, stage 2, estrogen receptor negative ([facility] this is followed by oncology and remains in remission. 5. Essential hypertension we encourage her to monitor blood pressure. Subjective:   Patient seen today as a telehealth visit at her home. She has a known history of Covid infection, diabetes, obesity, primary hypertension, breast cancer seen for evaluation. She still has some congestion but she is feeling significantly better wants to go back to work tonight. Patient is seen for evaluation. Prior to Admission medications    Medication Sig Start Date End Date Taking? Authorizing Provider   insulin glargine (Lantus Solostar U-100 Insulin) 100 unit/mL (3 mL) inpn 54 Units by SubCUTAneous route nightly. 3/2/21  Yes Kaitlin Kimbrough MD   insulin lispro (HumaLOG KwikPen Insulin) 100 unit/mL kwikpen 20 Units by SubCUTAneous route Before breakfast, lunch, and dinner.  3/2/21  Yes Kaitlin Kimbrough MD   albuterol-ipratropium (DUO-NEB) 2.5 mg-0.5 mg/3 ml nebu 3 mL by Nebulization route every four (4) hours as needed for Wheezing. 3/2/21  Yes Kaitlin Kimbrough MD   Insulin Needles, Disposable, (Mary Pen Needle) 32 gauge x 5/32\" ndle USE AS DIRECTED TWICE DAILY. dx.e11.9 11/12/20  Yes Kaitlin Kimbrough MD   diclofenac (VOLTAREN) 1 % gel Apply  to affected area four (4) times daily. 11/12/20  Yes Kaitlin Kimbrough MD   Symbicort 160-4.5 mcg/actuation HFAA INHALE 2 PUFFS BY MOUTH TWICE DAILY 10/14/20  Yes Kaitlin Kimbrough MD   Ventolin HFA 90 mcg/actuation inhaler INHALE 2 PUFFS BY MOUTH EVERY 4 HOURS AS NEEDED FOR WHEEZING 10/5/20  Yes Kaitlin Kimbrough MD   flash glucose sensor (FreeStyle Thomas 2 Sensor) kit qid 8/18/20  Yes Kaitlin Kimbrough MD   flash glucose scanning reader (FreeStyle Thomas 2 [place]) misc qid 8/18/20  Yes Kaitlin Kimbrough MD   liraglutide (Victoza 2-Nathaniel) 0.6 mg/0.1 mL (18 mg/3 mL) pnij 1.8 mg by SubCUTAneous route daily. 6/29/20  Yes Kaitlin Kimbrough MD   dicyclomine (BentyL) 10 mg capsule Take 2 Caps by mouth four (4) times daily. 6/23/20  Yes Kaitlin Kimbrough MD   DULoxetine (CYMBALTA) 20 mg capsule Take 1 Cap by mouth daily. 6/23/20  Yes Kaitlin Kimbrough MD   triamcinolone acetonide (KENALOG) 0.1 % topical cream Apply  to affected area two (2) times a day. 12/6/19  Yes Kaitlin Kimbrough MD   pitavastatin calcium (LIVALO) 1 mg tab tablet Take 1 Tab by mouth daily. 10/17/19  Yes Kaitlin Kimbrough MD   sodium chloride (OCEAN) 0.65 % nasal squeeze bottle 0.1 mL by Both Nostrils route four (4) times daily. 1/19/19  Yes Kaitlin Kimbrough MD   glucose blood VI test strips (FREESTYLE LITE STRIPS) strip USE TO TEST BLOOD SUGAR THREE TIMES DAILY 12/28/18  Yes Kaitlin Kimbrough MD   FREESTYLE LANCETS 28 gauge misc USE TO TEST BLOOD SUGAR TID 1/11/18  Yes Provider, Historical   Blood-Glucose Meter (FREESTYLE LITE METER) monitoring kit Use to test blood sugar three times daily.  Emelia Ward 12/14/17  Yes Kenneth Grant MD   albuterol (PROVENTIL VENTOLIN) 2.5 mg /3 mL (0.083 %) nebulizer solution every four (4) hours as needed.  10/16/16  Yes Provider, Historical     Patient Active Problem List   Diagnosis Code    Diabetes ([address] E11.9    Sinusitis J32.9    IBS (irritable bowel syndrome) K58.9    Stress at home F43.9    Noncompliance Z91.19    Dyslipidemia E78.5    HTN (hypertension) I10    ACP (advance care planning) Z71.89    S/P lumpectomy, left breast Z98.890    Stage 2 carcinoma of breast, ER- ([address] C50.919, Q64.2    Uncomplicated asthma Q73.919    Chemotherapy induced diarrhea K52.1, T45.1X5A    Chemotherapy follow-up examination Z09    Constipation K59.00    Port catheter in place Z95.828    Chemotherapy-induced neuropathy ([address] G62.0, T45.1X5A    Joint stiffness M25.60    Cancer of left breast ([address] C50.912    Obesity, morbid ([address] E66.01    History of lumpectomy of left breast Z98.890    Psychiatric disorder F99    Plantar fasciitis, right M72.2    Abdominal pain R10.9    Diverticulosis K57.90    Hepatic steatosis K76.0    Ventral hernia K43.9    S/P colonoscopy Z98.890    COVID-19 virus infection U07.1    Asthma J45.909     Patient Active Problem List    Diagnosis Date Noted    Asthma     COVID-19 virus infection 02/13/2021    Abdominal pain 11/05/2019    Diverticulosis 11/05/2019    Hepatic steatosis 11/05/2019    Ventral hernia 11/05/2019    S/P colonoscopy 10/24/2019    Plantar fasciitis, right 09/25/2019    Psychiatric disorder 02/12/2018    Obesity, morbid ([address] 01/31/2018    History of lumpectomy of left breast 01/31/2018    Cancer of left breast ([address]     Joint stiffness 03/21/2017    Port catheter in place 02/23/2017    Chemotherapy-induced neuropathy ([address] 02/23/2017    Chemotherapy follow-up examination 01/31/2017    Constipation 01/31/2017    Chemotherapy induced diarrhea 12/20/2016    S/P lumpectomy, left breast 10/20/2016    Stage 2 carcinoma [facility], [facility]- [facility] 03/55/3700    Uncomplicated asthma 00/93/2943    ACP (advance care planning) 02/26/2016    Dyslipidemia     HTN (hypertension)     Noncompliance     IBS (irritable bowel syndrome)     Stress at home     Diabetes (HCC)     Sinusitis      Current Outpatient Medications   Medication Sig Dispense Refill    insulin glargine (Lantus Solostar U-100 Insulin) 100 unit/mL (3 mL) inpn 54 Units by SubCUTAneous route nightly. 15 mL 11    insulin lispro (HumaLOG KwikPen Insulin) 100 unit/mL kwikpen 20 Units by SubCUTAneous route Before breakfast, lunch, and dinner. 15 mL 11    albuterol-ipratropium (DUO-NEB) 2.5 mg-0.5 mg/3 ml nebu 3 mL by Nebulization route every four (4) hours as needed for Wheezing. 150 Nebule 11    Insulin Needles, Disposable, (Mary Pen Needle) 32 gauge x 5/32\" ndle USE AS DIRECTED TWICE DAILY. dx.e11.9 100 Pen Needle 11    diclofenac (VOLTAREN) 1 % gel Apply  to affected area four (4) times daily. 100 g 11    Symbicort 160-4.5 mcg/actuation HFAA INHALE 2 PUFFS BY MOUTH TWICE DAILY 1 Inhaler 11    Ventolin HFA 90 mcg/actuation inhaler INHALE 2 PUFFS BY MOUTH EVERY 4 HOURS AS NEEDED FOR WHEEZING 18 g 11    flash glucose sensor (FreeStyle Thomas 2 Sensor) kit qid 2 Kit 11    flash glucose scanning reader (FreeStyle Thomas 2 [place]) misc qid 1 Each 11    liraglutide (Victoza 2-Nathaniel) 0.6 mg/0.1 mL (18 mg/3 mL) pnij 1.8 mg by SubCUTAneous route daily. 6 mL 11    dicyclomine (BentyL) 10 mg capsule Take 2 Caps by mouth four (4) times daily. 120 Cap 11    DULoxetine (CYMBALTA) 20 mg capsule Take 1 Cap by mouth daily. 30 Cap 5    triamcinolone acetonide (KENALOG) 0.1 % topical cream Apply  to affected area two (2) times a day. 15 g 11    pitavastatin calcium (LIVALO) 1 mg tab tablet Take 1 Tab by mouth daily. 30 Tab 11    sodium chloride (OCEAN) 0.65 % nasal squeeze bottle 0.1 mL by Both Nostrils route four (4) times daily.  45 mL 11    glucose blood VI test strips (FREESTYLE LITE STRIPS) strip USE TO TEST BLOOD SUGAR THREE TIMES DAILY 100 Strip 11    FREESTYLE LANCETS 28 gauge misc USE TO TEST BLOOD SUGAR TID  11    Blood-Glucose Meter (FREESTYLE LITE METER) monitoring kit Use to test blood sugar three times daily. Dx.e119 1 Kit 0    albuterol (PROVENTIL VENTOLIN) 2.5 mg /3 mL (0.083 %) nebulizer solution every four (4) hours as needed. Allergies   Allergen Reactions    Atorvastatin Other (comments)     Joint pains in arms    Codeine Diarrhea    Pravastatin Myalgia     Terrible leg aching.  Theophylline Diarrhea     Past Medical History:   Diagnosis Date    Abdominal pain 11/05/2019    ct vcu - hepatomegaly with diffuse hepatic steatosis, diverticulosis,sma;; supraumbilical ventral hernia containing fat    Arthritis     Asthma     Cancer of left breast ([facility] [address] 08/01/2016    BREAST left lumpectomy 10/16 with b/l reduction, Adjuvant chemotherapy with TAC x 4,stopped due to peripheral neuropathy,Radiation 3/17 at [address] Chronic pain     lower extremities    COVID-19 virus infection 02/13/2021    Diabetes ([facility] [address] 2012    Diverticulosis 11/05/2019    ct    Dyslipidemia     Hepatic steatosis 11/05/2019    ct    IBS (irritable bowel syndrome)     Keloid 08/01/2017    Excision of keloid scar approximately 4 cm -Sean Tinoco MD     Nausea & vomiting     Noncompliance     Obesity     Plantar fasciitis, right 09/25/2019    Psychiatric disorder 02/12/2018    Normal MRI of the brain.     Radiation therapy complication 4073    left    S/P breast biopsy, left 8/1/16    S/P colonoscopy 10/24/2019    mcv polyp repeat 5 yrs    Sinusitis     Stress at home     Ventral hernia 11/05/2019     Past Surgical History:   Procedure Laterality Date    HX BREAST LUMPECTOMY Left 10/13/2016    LEFT BREAST REDUCTION LUMPECTOMY, LEFT SENTINEL NODE BIOPSY, LEFT BRACKETED NEEDLE LOCALIZATION, LEFT BREAST RECONSTRUCTION performed by Sean Tinoco MD at [facility] AMBULATORY OR    HX BREAST RECONSTRUCTION Bilateral 10/13/2016    BREAST REDUCTION performed by Krystyna Aguila MD at [address] HX BREAST REDUCTION Bilateral 2016    HX  SECTION  1658.2118    X2    HX GYN  2002    ABLATION    HX HERNIA REPAIR       umbilical as infant    HX HYSTERECTOMY  2008    rosangela bso    HX ORTHOPAEDIC Left 2006    left dequervains    HX VASCULAR ACCESS Right 2016,2017    insertiona and removal of power port     Family History   Problem Relation Age of Onset    Heart Disease Mother     Hypertension Mother     Heart Disease Father     Seizures Sister     Anesth Problems Neg Hx      Social History     Tobacco Use    Smoking status: Never Smoker    Smokeless tobacco: Never Used   Substance Use Topics    Alcohol use: No       ROS    Objective:   No flowsheet data found.      [INSTRUCTIONS:  \"[x]\" Indicates a positive item  \"[]\" Indicates a negative item  -- DELETE ALL ITEMS NOT EXAMINED]    Constitutional: [x] Appears well-developed and well-nourished [x] No apparent distress      [] Abnormal -     Mental status: [x] Alert and awake  [x] Oriented to person/place/time [x] Able to follow commands    [] Abnormal -     Eyes:   EOM    [x]  Normal    [] Abnormal -   Sclera  [x]  Normal    [] Abnormal -          Discharge [x]  None visible   [] Abnormal -     HENT: [x] Normocephalic, atraumatic  [] Abnormal -   [x] Mouth/Throat: Mucous membranes are moist    External Ears [x] Normal  [] Abnormal -    Neck: [x] No visualized mass [] Abnormal -     Pulmonary/Chest: [x] Respiratory effort normal   [x] No visualized signs of difficulty breathing or respiratory distress        [] Abnormal -      Musculoskeletal:   [x] Normal gait with no signs of ataxia         [x] Normal range of motion of neck        [] Abnormal -     Neurological:        [x] No Facial Asymmetry (Cranial nerve 7 motor function) (limited exam due to video visit)          [x] No gaze palsy [] Abnormal -          Skin:        [x] No significant exanthematous lesions or discoloration noted on facial skin         [] Abnormal -            Psychiatric:       [x] Normal Affect [] Abnormal -        [x] No Hallucinations    Other pertinent observable physical exam findings:-        We discussed the expected course, resolution and complications of the diagnosis(es) in detail. Medication risks, benefits, costs, interactions, and alternatives were discussed as indicated. I advised her to contact the office if her condition worsens, changes or fails to improve as anticipated. She expressed understanding with the diagnosis(es) and plan. Stepan Rollins, was evaluated through a synchronous (real-time) audio-video encounter. The patient (or guardian if applicable) is aware that this is a billable service. Verbal consent to proceed has been obtained within the past 12 months. The visit was conducted pursuant to the emergency declaration under the [address] [place] [address], [address] [place] [address] authority and the [employer] and [employer] Act. Patient identification was verified, and a caregiver was present when appropriate. The patient was located in a state where the provider was credentialed to provide care. Kari Kincaid MD    Please note that portions of this dictation may have been recorded with voice recognition software. Some unanticipated grammatical, syntax, homophones, and other interpretive errors are inadvertently transcribed by the computer software. An attempt at proof reading has been made to minimize errors and omissions. Please disregard these errors. Thank you.

## 2021-03-10 NOTE — PROGRESS NOTES
1. Have you been to the ER, urgent care clinic since your last visit? Hospitalized since your last visit? No    2. Have you seen or consulted any other health care providers outside of the [address] [place] [address] since your last visit? Include any pap smears or colon screening.  No    Patient wants to return to work tonight

## 2021-04-06 RX ORDER — DICLOFENAC SODIUM 10 MG/G
GEL TOPICAL 4 TIMES DAILY
Qty: 100 G | Refills: 11 | Status: SHIPPED | OUTPATIENT
Start: 2021-04-06 | End: 2022-01-30

## 2021-05-17 RX ORDER — BUPROPION HYDROCHLORIDE 150 MG/1
150 TABLET, EXTENDED RELEASE ORAL 2 TIMES DAILY
Qty: 30 TAB | Refills: 5 | Status: SHIPPED | OUTPATIENT
Start: 2021-05-17 | End: 2021-08-09 | Stop reason: SDUPTHER

## 2021-05-17 RX ORDER — INSULIN GLARGINE 100 [IU]/ML
64 INJECTION, SOLUTION SUBCUTANEOUS
Qty: 15 ML | Refills: 11 | Status: SHIPPED | OUTPATIENT
Start: 2021-05-17

## 2021-08-03 PROBLEM — J45.909 ASTHMA: Status: RESOLVED | Noted: 2021-08-03 | Resolved: 2021-08-03

## 2021-08-09 RX ORDER — BUPROPION HYDROCHLORIDE 150 MG/1
150 TABLET, EXTENDED RELEASE ORAL 2 TIMES DAILY
Qty: 30 TABLET | Refills: 5 | Status: SHIPPED | OUTPATIENT
Start: 2021-08-09 | End: 2022-01-24 | Stop reason: SDUPTHER

## 2021-08-25 RX ORDER — FLASH GLUCOSE SENSOR
KIT MISCELLANEOUS
Qty: 2 KIT | Refills: 11 | Status: SHIPPED | OUTPATIENT
Start: 2021-08-25 | End: 2022-01-24

## 2021-09-13 RX ORDER — ALBUTEROL SULFATE 90 UG/1
AEROSOL, METERED RESPIRATORY (INHALATION)
Qty: 18 G | Refills: 11 | Status: SHIPPED | OUTPATIENT
Start: 2021-09-13

## 2021-12-30 RX ORDER — PEN NEEDLE, DIABETIC 32GX 5/32"
NEEDLE, DISPOSABLE MISCELLANEOUS
Qty: 100 PEN NEEDLE | Refills: 11 | Status: SHIPPED | OUTPATIENT
Start: 2021-12-30

## 2022-01-24 ENCOUNTER — OFFICE VISIT (OUTPATIENT)
Dept: INTERNAL MEDICINE CLINIC | Age: 52
End: 2022-01-24
Payer: COMMERCIAL

## 2022-01-24 VITALS
SYSTOLIC BLOOD PRESSURE: 145 MMHG | BODY MASS INDEX: 48.6 KG/M2 | RESPIRATION RATE: 20 BRPM | HEIGHT: 61 IN | HEART RATE: 104 BPM | OXYGEN SATURATION: 98 % | TEMPERATURE: 98.3 F | DIASTOLIC BLOOD PRESSURE: 84 MMHG | WEIGHT: 257.4 LBS

## 2022-01-24 DIAGNOSIS — E66.01 OBESITY, MORBID (HCC): ICD-10-CM

## 2022-01-24 DIAGNOSIS — T45.1X5A CHEMOTHERAPY-INDUCED NEUROPATHY (HCC): ICD-10-CM

## 2022-01-24 DIAGNOSIS — C50.912 CARCINOMA OF LEFT BREAST, STAGE 2, ESTROGEN RECEPTOR NEGATIVE (HCC): ICD-10-CM

## 2022-01-24 DIAGNOSIS — G62.0 CHEMOTHERAPY-INDUCED NEUROPATHY (HCC): ICD-10-CM

## 2022-01-24 DIAGNOSIS — E11.9 TYPE 2 DIABETES MELLITUS WITHOUT COMPLICATION, UNSPECIFIED WHETHER LONG TERM INSULIN USE (HCC): ICD-10-CM

## 2022-01-24 DIAGNOSIS — Z17.1 CARCINOMA OF LEFT BREAST, STAGE 2, ESTROGEN RECEPTOR NEGATIVE (HCC): ICD-10-CM

## 2022-01-24 PROCEDURE — 99214 OFFICE O/P EST MOD 30 MIN: CPT | Performed by: INTERNAL MEDICINE

## 2022-01-24 RX ORDER — TRIAMCINOLONE ACETONIDE 1 MG/G
CREAM TOPICAL 2 TIMES DAILY
Qty: 15 G | Refills: 11 | Status: SHIPPED | OUTPATIENT
Start: 2022-01-24

## 2022-01-24 RX ORDER — BUPROPION HYDROCHLORIDE 150 MG/1
150 TABLET, EXTENDED RELEASE ORAL 2 TIMES DAILY
Qty: 30 TABLET | Refills: 5 | Status: SHIPPED | OUTPATIENT
Start: 2022-01-24 | End: 2022-06-07 | Stop reason: SDUPTHER

## 2022-01-24 RX ORDER — FLASH GLUCOSE SENSOR
2 KIT MISCELLANEOUS 4 TIMES DAILY
Qty: 2 KIT | Refills: 11 | Status: SHIPPED | OUTPATIENT
Start: 2022-01-24

## 2022-01-24 RX ORDER — LIRAGLUTIDE 6 MG/ML
1.8 INJECTION SUBCUTANEOUS DAILY
Qty: 6 ML | Refills: 11 | Status: SHIPPED | OUTPATIENT
Start: 2022-01-24
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
## 2022-01-24 NOTE — PROGRESS NOTES
1. Have you been to the ER, urgent care clinic since your last visit? Hospitalized since your last visit? No    2. Have you seen or consulted any other health care providers outside of the [address] [place] [address] since your last visit? Include any pap smears or colon screening.  No

## 2022-01-25 LAB
ALBUMIN SERPL-MCNC: 3.8 G/DL (ref 3.5–5)
ALBUMIN/GLOB SERPL: 1.1 {RATIO} (ref 1.1–2.2)
ALP SERPL-CCNC: 94 U/L (ref 45–117)
ALT SERPL-CCNC: 31 U/L (ref 12–78)
ANION GAP SERPL CALC-SCNC: 6 MMOL/L (ref 5–15)
APPEARANCE UR: CLEAR
AST SERPL-CCNC: 27 U/L (ref 15–37)
BASOPHILS # BLD: 0 K/UL (ref 0–0.1)
BASOPHILS NFR BLD: 0 % (ref 0–1)
BILIRUB SERPL-MCNC: 0.4 MG/DL (ref 0.2–1)
BILIRUB UR QL: NEGATIVE
BUN SERPL-MCNC: 14 MG/DL (ref 6–20)
BUN/CREAT SERPL: 18 (ref 12–20)
CALCIUM SERPL-MCNC: 9.3 MG/DL (ref 8.5–10.1)
CHLORIDE SERPL-SCNC: 107 MMOL/L (ref 97–108)
CHOLEST SERPL-MCNC: 237 MG/DL
CO2 SERPL-SCNC: 27 MMOL/L (ref 21–32)
COLOR UR: ABNORMAL
CREAT SERPL-MCNC: 0.76 MG/DL (ref 0.55–1.02)
CREAT UR-MCNC: 68.7 MG/DL
DIFFERENTIAL METHOD BLD: NORMAL
EOSINOPHIL # BLD: 0 K/UL (ref 0–0.4)
EOSINOPHIL NFR BLD: 0 % (ref 0–7)
ERYTHROCYTE [DISTWIDTH] IN BLOOD BY AUTOMATED COUNT: 13.2 % (ref 11.5–14.5)
EST. AVERAGE GLUCOSE BLD GHB EST-MCNC: 272 MG/DL
GLOBULIN SER CALC-MCNC: 3.4 G/DL (ref 2–4)
GLUCOSE SERPL-MCNC: 317 MG/DL (ref 65–100)
GLUCOSE UR STRIP.AUTO-MCNC: >1000 MG/DL
HBA1C MFR BLD: 11.1 % (ref 4–5.6)
HCT VFR BLD AUTO: 41.8 % (ref 35–47)
HCV AB SERPL QL IA: NONREACTIVE
HDLC SERPL-MCNC: 52 MG/DL
HDLC SERPL: 4.6 {RATIO} (ref 0–5)
HGB BLD-MCNC: 13.4 G/DL (ref 11.5–16)
HGB UR QL STRIP: NEGATIVE
IMM GRANULOCYTES # BLD AUTO: 0 K/UL (ref 0–0.04)
IMM GRANULOCYTES NFR BLD AUTO: 0 % (ref 0–0.5)
KETONES UR QL STRIP.AUTO: ABNORMAL MG/DL
LDLC SERPL CALC-MCNC: 155.2 MG/DL (ref 0–100)
LEUKOCYTE ESTERASE UR QL STRIP.AUTO: NEGATIVE
LYMPHOCYTES # BLD: 2.9 K/UL (ref 0.8–3.5)
LYMPHOCYTES NFR BLD: 32 % (ref 12–49)
MCH RBC QN AUTO: 29.1 PG (ref 26–34)
MCHC RBC AUTO-ENTMCNC: 32.1 G/DL (ref 30–36.5)
MCV RBC AUTO: 90.9 FL (ref 80–99)
MICROALBUMIN UR-MCNC: 0.86 MG/DL
MICROALBUMIN/CREAT UR-RTO: 13 MG/G (ref 0–30)
MONOCYTES # BLD: 0.5 K/UL (ref 0–1)
MONOCYTES NFR BLD: 5 % (ref 5–13)
NEUTS SEG # BLD: 5.6 K/UL (ref 1.8–8)
NEUTS SEG NFR BLD: 63 % (ref 32–75)
NITRITE UR QL STRIP.AUTO: NEGATIVE
NRBC # BLD: 0 K/UL (ref 0–0.01)
NRBC BLD-RTO: 0 PER 100 WBC
PH UR STRIP: 6 [PH] (ref 5–8)
PLATELET # BLD AUTO: 255 K/UL (ref 150–400)
PMV BLD AUTO: 11.8 FL (ref 8.9–12.9)
POTASSIUM SERPL-SCNC: 4.6 MMOL/L (ref 3.5–5.1)
PROT SERPL-MCNC: 7.2 G/DL (ref 6.4–8.2)
PROT UR STRIP-MCNC: NEGATIVE MG/DL
RBC # BLD AUTO: 4.6 M/UL (ref 3.8–5.2)
SODIUM SERPL-SCNC: 140 MMOL/L (ref 136–145)
SP GR UR REFRACTOMETRY: 1.01
TRIGL SERPL-MCNC: 149 MG/DL (ref ?–150)
UROBILINOGEN UR QL STRIP.AUTO: 0.2 EU/DL (ref 0.2–1)
VLDLC SERPL CALC-MCNC: 29.8 MG/DL
WBC # BLD AUTO: 9 K/UL (ref 3.6–11)

## 2022-01-26 NOTE — PROGRESS NOTES
Your sugar control is very poor.   Please send me some sugar readings via [employer] and I will help you adjust your insulin to get your blood sugar into an acceptable range

## 2022-01-30 RX ORDER — DICLOFENAC SODIUM 10 MG/G
GEL TOPICAL
Qty: 100 G | Refills: 11 | Status: SHIPPED | OUTPATIENT
Start: 2022-01-30 | End: 2022-06-26

## 2022-01-31 ENCOUNTER — OFFICE VISIT (OUTPATIENT)
Dept: ORTHOPEDIC SURGERY | Age: 52
End: 2022-01-31
Payer: COMMERCIAL

## 2022-01-31 VITALS — HEIGHT: 61 IN | WEIGHT: 257 LBS | BODY MASS INDEX: 48.52 KG/M2

## 2022-01-31 DIAGNOSIS — M76.892 HIP ABDUCTOR TENDINITIS, LEFT: ICD-10-CM

## 2022-01-31 DIAGNOSIS — G89.29 CHRONIC PAIN OF RIGHT KNEE: ICD-10-CM

## 2022-01-31 DIAGNOSIS — M25.561 CHRONIC PAIN OF RIGHT KNEE: ICD-10-CM

## 2022-01-31 DIAGNOSIS — M47.816 LUMBAR SPONDYLOSIS: ICD-10-CM

## 2022-01-31 DIAGNOSIS — M17.11 PRIMARY OSTEOARTHRITIS OF RIGHT KNEE: Primary | ICD-10-CM

## 2022-01-31 DIAGNOSIS — M25.552 LATERAL PAIN OF LEFT HIP: ICD-10-CM

## 2022-01-31 PROCEDURE — 99204 OFFICE O/P NEW MOD 45 MIN: CPT | Performed by: PHYSICIAN ASSISTANT

## 2022-01-31 PROCEDURE — 20610 DRAIN/INJ JOINT/BURSA W/O US: CPT | Performed by: PHYSICIAN ASSISTANT

## 2022-01-31 RX ORDER — TRIAMCINOLONE ACETONIDE 40 MG/ML
40 INJECTION, SUSPENSION INTRA-ARTICULAR; INTRAMUSCULAR ONCE
Status: COMPLETED | OUTPATIENT
Start: 2022-01-31 | End: 2022-01-31

## 2022-01-31 RX ORDER — FLUCONAZOLE 150 MG/1
150 TABLET ORAL DAILY
Qty: 2 TABLET | Refills: 1 | Status: SHIPPED | OUTPATIENT
Start: 2022-01-31 | End: 2022-04-26

## 2022-01-31 RX ORDER — BUPIVACAINE HYDROCHLORIDE 2.5 MG/ML
5 INJECTION, SOLUTION INFILTRATION; PERINEURAL ONCE
Status: COMPLETED | OUTPATIENT
Start: 2022-01-31 | End: 2022-01-31

## 2022-01-31 RX ADMIN — TRIAMCINOLONE ACETONIDE 40 MG: 40 INJECTION, SUSPENSION INTRA-ARTICULAR; INTRAMUSCULAR at 14:49

## 2022-01-31 RX ADMIN — BUPIVACAINE HYDROCHLORIDE 12.5 MG: 2.5 INJECTION, SOLUTION INFILTRATION; PERINEURAL at 14:49

## 2022-01-31 NOTE — PROGRESS NOTES
Kaycee Gonzalez (: 1970) is a 45 y.o. female, patient, here for evaluation of the following chief complaint(s):  Knee Pain (right knee) and Hip Pain (left lateral hip)       SUBJECTIVE/OBJECTIVE:  Kaycee Gonzalez presents today complaining of right knee pain and left hip pain. Right knee pain is worse. Present for several years. Remote history of injections in the past which helped for a while. Pain is diffuse without radiation. Any weightbearing activity hurts. Positional wakening night pain is present. Occasional swelling. No mechanical symptoms to include locking or catching. In addition, she has left lateral \"hip\" pain located along the lateral side of her hip into her gluteal region. Pain does not radiate down her leg. Denies groin pain. Tenderness palpation in this area. Hurts to lay on that side. PHYSICAL EXAM:  Vitals: Ht 5' 1\" (1.549 m)   Wt 257 lb (116.6 kg)   BMI 48.56 kg/m²   Body mass index is 48.56 kg/m². 45y.o. year old F in no acute distress. Morbidly obese. Ambulates with an antalgic gait pattern, no Trendelenburg gait sign. No pain with flexion or extension of the lumbar spine. Tenderness to palpation over her greater trochanter on the left. Negative straight leg raise, no nerve tension signs. Negative Stinchfield bilaterally. Discomfort laterally with passive range of motion past 90 and external rotation 20. Internal rotation 10 -15, no pain. Right knee neutral alignment. Peripatellar and joint line tenderness. No effusion. Range of motion 090 5 to 100 degrees, limited by pain. No instability. Motor 5/5. No distal edema. Symmetrical palpable distal pulses. IMAGING:  Radiographs: XR Results (maximum last 2): Results from Appointment encounter on 22    XR KNEE RT MIN 4 V    Narrative  Four views (AP, PA-flex, lateral & sunrise) of the right knee were taken and reviewed today using digital radiography which reveal preserved joint space. Mild PF OA. XR PELV 1 OR 2 V    Narrative  AP pelvis reveals lateral trochanteric changes bilaterally, labral calcification. Preserved hip joint space otherwise. Coxa profunda. ASSESSMENT/PLAN:  1. Primary osteoarthritis of right knee  -     REFERRAL TO PHYSICAL THERAPY  -     bupivacaine HCl (MARCAINE) 0.25 % (2.5 mg/mL) injection 12.5 mg; 12.5 mg (5 mL), Other, ONCE, 1 dose, On Mon 1/31/22 at 1500  -     triamcinolone acetonide (KENALOG-40) 40 mg/mL injection 40 mg; 40 mg, Intra artICUlar, ONCE, 1 dose, On Mon 1/31/22 at 1500  2. Hip abductor tendinitis, left  -     REFERRAL TO PHYSICAL THERAPY  3. Lumbar spondylosis  -     REFERRAL TO PHYSICAL THERAPY  4. Chronic pain of right knee  -     XR KNEE RT MIN 4 V; Future  5. Lateral pain of left hip  -     XR PELV 1 OR 2 V; Future    The xray and exam findings were discussed with the patient today. Multiple pain generators today. Osteoarthritis of the right knee, specifically PF. Discussed risks/benefits of conservative vs. operative interventions at this time to include NSAIDs, PT, cortisone injections, and surgery. She elects to proceed with formal physical therapy for quad strengthening as well as a cortisone injection today. If no relief, or pain quickly returns, we could consider anti-inflammatories as well. Discussed risks/benefits of cortisone injection and patient gave verbal consent. Under sterile conditions, the right knee was injected with 5cc 0.25% Bupivacaine and 1cc 40mg Triamcinolone Acetonide (Kenalog) intra-articularly, tolerated the procedure well. Left lateral hip pain could be overlapping degenerative disc disease/lumbar spondylosis, but primarily suspect hip abductor tendinitis. She understands that this pain could be ongoing and difficult to treat. She agreed to formal physical therapy for this as well to work on core, gluteal strengthening, as well as stretching of the abductors.   Advised against cortisone injections here.     Discussed natural disease progression. Although she is not a surgical candidate based on her xrays right now, situation is further complicated by morbid obesity and uncontrolled diabetes as reviewed in Dr. Eve Hoyt recent office note and patients lab work. (A1C 11.1)  Recommend tighter glucose control and weight loss. Follow up as needed. Return if symptoms worsen or fail to improve. Review Of Systems  ROS     Positive for: Musculoskeletal    Last edited by Gordo Sams RN on 1/31/2022  2:07 PM. (History)         Patient denies any recent fever, chills, nausea, vomiting, chest pain, or shortness of breath. Allergies   Allergen Reactions    Atorvastatin Other (comments)     Joint pains in arms    Codeine Diarrhea    Pravastatin Myalgia     Terrible leg aching.  Theophylline Diarrhea       Current Outpatient Medications   Medication Sig    diclofenac (VOLTAREN) 1 % gel APPLY EXTERNALLY TO THE AFFECTED AREA FOUR TIMES DAILY    buPROPion SR (WELLBUTRIN SR) 150 mg SR tablet Take 1 Tablet by mouth two (2) times a day.  liraglutide (Victoza 2-Nathaniel) 0.6 mg/0.1 mL (18 mg/3 mL) pnij 1.8 mg by SubCUTAneous route daily.  triamcinolone acetonide (KENALOG) 0.1 % topical cream Apply  to affected area two (2) times a day.  pitavastatin calcium (Livalo) 2 mg tablet Take 1 Tablet by mouth daily.  flash glucose sensor (FreeStyle Thomas 14 Day Sensor) kit 2 Kits by Does Not Apply route four (4) times daily.  BD Mary 2nd Gen Pen Needle 32 gauge x 5/32\" ndle USE AS DIRECTED TWICE DAILY    albuterol (PROVENTIL HFA, VENTOLIN HFA, PROAIR HFA) 90 mcg/actuation inhaler INHALE 2 PUFFS BY MOUTH EVERY 4 HOURS AS NEEDED FOR WHEEZING    insulin glargine (Lantus Solostar U-100 Insulin) 100 unit/mL (3 mL) inpn 64 Units by SubCUTAneous route nightly.  insulin lispro (HumaLOG KwikPen Insulin) 100 unit/mL kwikpen 20 Units by SubCUTAneous route Before breakfast, lunch, and dinner.     albuterol-ipratropium (DUO-NEB) 2.5 mg-0.5 mg/3 ml nebu 3 mL by Nebulization route every four (4) hours as needed for Wheezing.  Symbicort 160-4.5 mcg/actuation HFAA INHALE 2 PUFFS BY MOUTH TWICE DAILY    FREESTYLE LANCETS 28 gauge misc USE TO TEST BLOOD SUGAR TID    albuterol (PROVENTIL VENTOLIN) 2.5 mg /3 mL (0.083 %) nebulizer solution every four (4) hours as needed. No current facility-administered medications for this visit. Past Medical History:   Diagnosis Date    Abdominal pain 11/05/2019    ct vcu - hepatomegaly with diffuse hepatic steatosis, diverticulosis,sma;; supraumbilical ventral hernia containing fat    Arthritis     Asthma     Cancer of left breast ([facility] [address] 08/01/2016    BREAST left lumpectomy 10/16 with b/l reduction, Adjuvant chemotherapy with TAC x 4,stopped due to peripheral neuropathy,Radiation 3/17 at [address] Chronic pain     lower extremities    COVID-19 virus infection 02/13/2021    Diabetes ([facility] [address] 2012    Diverticulosis 11/05/2019    ct    Dyslipidemia     Hepatic steatosis 11/05/2019    ct    IBS (irritable bowel syndrome)     Keloid 08/01/2017    Excision of keloid scar approximately 4 cm -Rhiannon West MD     Nausea & vomiting     Noncompliance     Obesity     Plantar fasciitis, right 09/25/2019    Psychiatric disorder 02/12/2018    Normal MRI of the brain.     Radiation therapy complication 9776    left    S/P breast biopsy, left 8/1/16    S/P colonoscopy 10/24/2019    mcv polyp repeat 5 yrs    Sinusitis     Stress at home     Ventral hernia 11/05/2019        Past Surgical History:   Procedure Laterality Date    HX BREAST LUMPECTOMY Left 10/13/2016    LEFT BREAST REDUCTION LUMPECTOMY, LEFT SENTINEL NODE BIOPSY, LEFT BRACKETED NEEDLE LOCALIZATION, LEFT BREAST RECONSTRUCTION performed by Rhiannon West MD at [address] HX BREAST RECONSTRUCTION Bilateral 10/13/2016    BREAST REDUCTION performed by Todd Mane [facility] at [facility] AMBULATORY OR    HX BREAST REDUCTION Bilateral 2016    HX  SECTION  0084.5639    X2    HX GYN  2002    ABLATION    HX HERNIA REPAIR       umbilical as infant    HX HYSTERECTOMY  2008    rosangela bso    HX ORTHOPAEDIC Left 2006    left dequervains    HX VASCULAR ACCESS Right 2016,2017    insertiona and removal of power port       Family History   Problem Relation Age of Onset    Heart Disease Mother     Hypertension Mother     Heart Disease Father     Seizures Sister     Anesth Problems Neg Hx         Social History     Socioeconomic History    Marital status: SINGLE     Spouse name: Not on file    Number of children: Not on file    Years of education: Not on file    Highest education level: Not on file   Occupational History    Not on file   Tobacco Use    Smoking status: Never Smoker    Smokeless tobacco: Never Used   Vaping Use    Vaping Use: Never used   Substance and Sexual Activity    Alcohol use: No    Drug use: No    Sexual activity: Yes     Partners: Male     Birth control/protection: None   Other Topics Concern    Not on file   Social History Narrative         Medical History: Recurrent depressive d/o 2012Muscloskeletal back pain 2012Carpal tunnel syndrome 2012Asthma 1979Obesity 2012IBS (nima Araujo) 2012diabetes mellitus type 2 2013    Gyn History: Last mammogram date 2013. Surgical History:  x2, ROSANGELA 10/02    Hospitalization/Major Diagnostic Procedure: asthma childhood        Family History: Mother: alive 68 yrs CAD, HTN, depression, dementia - patient cargiver - lives alone come to see her dailyFather: alive 67 yrs Brother(s): 40 yrs mental issues Son(s) 31 yo alive and well,    bipolar and schizophrenia on SSI kelley [id number] was incarcerated since T[place] 15, 2015  tried to miriam a friend with others. He will be out in . He tried to kill his mother. .    Social History: Alcohol Use Patient does not use alcohol.  Smoking Status Patient is a never smoker. Marital Status: Single. Lives w ith: alone    son. 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 Education/School: has highschool diploma, college. [employer]: [employer].     Social Determinants of Health     Financial Resource Strain:     Difficulty of Paying Living Expenses: Not on file   Food Insecurity:     Worried About Running Out of Food in the Last Year: Not on file    Alejandro of Food in the Last Year: Not on file   Transportation Needs:     Lack of Transportation (Medical): Not on file    Lack of Transportation (Non-Medical):  Not on file   Physical Activity:     Days of Exercise per Week: Not on file    Minutes of Exercise per Session: Not on file   Stress:     Feeling of Stress : Not on file   Social Connections:     Frequency of Communication with Friends and Family: Not on file    Frequency of Social Gatherings with Friends and Family: Not on file    Attends Taoist Services: Not on file    Active Member of [address] [place] [address] or Organizations: Not on file    Attends Club or Organization Meetings: Not on file    Marital Status: Not on file   Intimate Partner Violence:     Fear of Current or Ex-Partner: Not on file    Emotionally Abused: Not on file    Physically Abused: Not on file    Sexually Abused: Not on file   Housing Stability:     Unable to Pay for Housing in the Last Year: Not on file    Number of [place] in the Last Year: Not on file    Unstable Housing in the Last Year: Not on file       Orders Placed This Encounter    XR PELV 1 OR 2 V     9     Standing Status:   Future     Number of Occurrences:   1     Standing Expiration Date:   1/31/2023    XR KNEE RT MIN 4 V     9     Standing Status:   Future     Number of Occurrences:   1     Standing Expiration Date:   2/1/2023    REFERRAL TO PHYSICAL THERAPY     Referral Priority:   Routine     Referral Type:   PT/OT/ST     Referral Reason:   Specialty Services Required     Number of Visits Requested: 1    bupivacaine HCl (MARCAINE) 0.25 % (2.5 mg/mL) injection 12.5 mg    triamcinolone acetonide (KENALOG-40) 40 mg/mL injection 40 mg      Eric Carballo M.D. was available for immediate consultation as the supervising physician. An electronic signature was used to authenticate this note.   -- Baljeet Monzon PA-C

## 2022-01-31 NOTE — LETTER
1/31/2022    Patient: Tameka Whalen   YOB: 1970   Date of Visit: 1/31/2022     Anay Mobley MD  [facility]  [address] [address],[address]  [address] [address], [facility]    Dear Anay Mobley MD,      Thank you for referring Ms. Mindy Spears to [facility] for evaluation. My notes for this consultation are attached. If you have questions, please do not hesitate to call me. I look forward to following your patient along with you.       Sincerely,    Donnie Doan MD

## 2022-02-04 NOTE — PROGRESS NOTES
Patient Name: Herberth Munguia  Date:2022  : 1970  [x]  Patient  Verified  Payor: Ursula Cranker / Plan: [place] [employer] / Product Type: PPO /    Total Treatment Time (min): 45  1:1 Treatment Time ( only):        Subjective:    Patient is a 45 y.o. female referred to physical therapy by Dr. Bryce GARZA with a diagnosis of right knee pain secondary to osteoarthritis. She also has a diagnosis for her back and left hip. She states right knee is the greatest of her concerns. Patient reports chronic right knee pain for several years which has been worse over the past several months. She complains of pain when sitting with her knees flexed for a prolonged period of time. She states knee pain interferes with all ADL activities. She did receive a cortisone injection on  which she states has been somewhat helpful. She states pain prior to injection was a 7/10 while pain after injection has been a 3-4/10. She reports a history of chronic lower back pain as well. She states her lateral hip became more painful after an awkward twisting and pivoting episode while getting into bed. She locates hip pain along the piriformis. She denies any hip or lower extremity pain more distally into the leg. She rates back and hip pain a 2/10. Patient is employed as a  for [employer] and is seated throughout much of her day. She states standing/walking endurance is limited to approximately 10 minutes. Patient states that she does have some baseline bilateral upper and lower extremity neuropathy secondary to her uncontrolled diabetes. She admits she is not currently active in any type of recreational exercise program.  She did undergo chemotherapy approximately 5 to 7 years ago. She does report history of right plantar fasciitis. Past medical history and medication list can otherwise be reviewed per the EHR.     Objective:    Patient ambulates in the clinic with a fairly nonantalgic gait pattern. She has some initial hesitancy with sit to stand transfers. She requires upper extremity assistance for sit to stand transfers. She is tender palpation along the left piriformis and sacral borders. She does have a lordotic curvature to her lumbar spine. AROM of the trunk when assessed in seated position is decreased by approximately 50% in all directions with centralized pain through the lumbar spine and piriformis. Lower extremity strength testing is 5/5 at all levels. AROM of the left knee from -3 degrees of extension to 110 degrees of flexion. AROM the right knee from -3 degrees of extension to 100 degrees of flexion with complaints of a medial knee \"pinch. \" Hip scour testing is negative. Straight leg raise testing is negative. Treatment today to include instruction in a home exercise program as well as providing patient with written and visual handouts. We discussed mechanical sources of symptoms as they related to her current treatment plan. Discussed the benefits of a general exercise program and efforts to lose weight and control her diabetes as well. All questions were addressed. Total treatment time today patient seen for 40 minutes. Assessment:    Patient presents with increased pain and decreased ROM, strength, and mobility consistent with right knee pain with osteoarthritis. She also has complaints of low back and posterior lateral hip pain secondary to degenerative changes and lordotic curvature aggravating her piriformis. Long Term Goals. 6 weeks  1. Patient will report right knee/low back/left hip pain to be consistently less than or equal to 1/10 with all home/work/community/recreational ADL activities. 2.  Patient will report ability to stand/ambulate for at least 30 minutes without need to sit and rest due to right knee/low back/left hip pain. Short Term Goals. 2 visits. 1. Patient will demonstrate independence with HEP.     Plan:  Plan of care: Physical therapy consisted of frequency of 1-2/week for the next 6 weeks. Physical therapy will consist of therapeutic exercise, modalities, patient education, neuromuscular reeducation, manual therapy, therapeutic activity, dry needling, and instruction in home exercise program as appropriate. Eval  Ex: 25  Man:   NMR:     The referring physician has reviewed and approved this evaluation and plan of care as noted by the electronic signature attached to note.     Lauri Aschoff DPT, ATC

## 2022-02-07 ENCOUNTER — OFFICE VISIT (OUTPATIENT)
Dept: ORTHOPEDIC SURGERY | Age: 52
End: 2022-02-07
Payer: COMMERCIAL

## 2022-02-07 DIAGNOSIS — M76.892 HIP ABDUCTOR TENDINITIS, LEFT: ICD-10-CM

## 2022-02-07 DIAGNOSIS — M17.11 PRIMARY OSTEOARTHRITIS OF RIGHT KNEE: Primary | ICD-10-CM

## 2022-02-07 DIAGNOSIS — M51.36 DDD (DEGENERATIVE DISC DISEASE), LUMBAR: ICD-10-CM

## 2022-02-07 PROCEDURE — 97162 PT EVAL MOD COMPLEX 30 MIN: CPT | Performed by: PHYSICAL THERAPIST

## 2022-02-07 PROCEDURE — 97110 THERAPEUTIC EXERCISES: CPT | Performed by: PHYSICAL THERAPIST

## 2022-02-19 RX ORDER — FLASH GLUCOSE SCANNING READER
EACH MISCELLANEOUS
Qty: 1 EACH | Refills: 11 | Status: SHIPPED | OUTPATIENT
Start: 2022-02-19

## 2022-03-18 PROBLEM — Z98.890 S/P COLONOSCOPY: Status: ACTIVE | Noted: 2019-10-24

## 2022-03-18 PROBLEM — Z09 CHEMOTHERAPY FOLLOW-UP EXAMINATION: Status: ACTIVE | Noted: 2017-01-31

## 2022-03-18 PROBLEM — K59.00 CONSTIPATION: Status: ACTIVE | Noted: 2017-01-31

## 2022-03-19 PROBLEM — K76.0 HEPATIC STEATOSIS: Status: ACTIVE | Noted: 2019-11-05

## 2022-03-19 PROBLEM — R10.9 ABDOMINAL PAIN: Status: ACTIVE | Noted: 2019-11-05

## 2022-03-19 PROBLEM — U07.1 COVID-19 VIRUS INFECTION: Status: ACTIVE | Noted: 2021-02-13

## 2022-03-19 PROBLEM — Z98.890 HISTORY OF LUMPECTOMY OF LEFT BREAST: Status: ACTIVE | Noted: 2018-01-31

## 2022-03-19 PROBLEM — K43.9 VENTRAL HERNIA: Status: ACTIVE | Noted: 2019-11-05

## 2022-03-19 PROBLEM — G62.0 CHEMOTHERAPY-INDUCED NEUROPATHY (HCC): Status: ACTIVE | Noted: 2017-02-23

## 2022-03-19 PROBLEM — T45.1X5A CHEMOTHERAPY-INDUCED NEUROPATHY (HCC): Status: ACTIVE | Noted: 2017-02-23

## 2022-03-19 PROBLEM — K57.90 DIVERTICULOSIS: Status: ACTIVE | Noted: 2019-11-05

## 2022-03-19 PROBLEM — F99 PSYCHIATRIC DISORDER: Status: ACTIVE | Noted: 2018-02-12

## 2022-03-20 PROBLEM — E66.01 OBESITY, MORBID (HCC): Status: ACTIVE | Noted: 2018-01-31

## 2022-03-20 PROBLEM — M72.2 PLANTAR FASCIITIS, RIGHT: Status: ACTIVE | Noted: 2019-09-25

## 2022-03-20 PROBLEM — M25.60 JOINT STIFFNESS: Status: ACTIVE | Noted: 2017-03-21

## 2022-03-30 RX ORDER — IPRATROPIUM BROMIDE AND ALBUTEROL SULFATE 2.5; .5 MG/3ML; MG/3ML
SOLUTION RESPIRATORY (INHALATION)
Qty: 450 ML | Refills: 11 | Status: SHIPPED | OUTPATIENT
Start: 2022-03-30 | End: 2022-09-20 | Stop reason: SDUPTHER

## 2022-04-03 RX ORDER — FLUTICASONE PROPIONATE 50 MCG
2 SPRAY, SUSPENSION (ML) NASAL DAILY
Qty: 1 EACH | Refills: 11 | Status: SHIPPED | OUTPATIENT
Start: 2022-04-03

## 2022-04-03 RX ORDER — CEFUROXIME AXETIL 500 MG/1
500 TABLET ORAL 2 TIMES DAILY
Qty: 20 TABLET | Refills: 0 | Status: SHIPPED | OUTPATIENT
Start: 2022-04-03 | End: 2022-04-13

## 2022-04-26 RX ORDER — FLUCONAZOLE 150 MG/1
150 TABLET ORAL DAILY
Qty: 2 TABLET | Refills: 1 | Status: SHIPPED | OUTPATIENT
Start: 2022-04-26 | End: 2022-04-27

## 2022-04-28 LAB — MAMMOGRAPHY, EXTERNAL: NORMAL

## 2022-05-10 DIAGNOSIS — C50.912 CARCINOMA OF LEFT BREAST, STAGE 2, ESTROGEN RECEPTOR NEGATIVE (HCC): ICD-10-CM

## 2022-05-10 DIAGNOSIS — Z17.1 CARCINOMA OF LEFT BREAST, STAGE 2, ESTROGEN RECEPTOR NEGATIVE (HCC): ICD-10-CM

## 2022-06-07 RX ORDER — BUPROPION HYDROCHLORIDE 150 MG/1
150 TABLET, EXTENDED RELEASE ORAL 2 TIMES DAILY
Qty: 30 TABLET | Refills: 5 | Status: SHIPPED | OUTPATIENT
Start: 2022-06-07

## 2022-06-26 RX ORDER — DICLOFENAC SODIUM 10 MG/G
GEL TOPICAL
Qty: 100 G | Refills: 11 | Status: SHIPPED | OUTPATIENT
Start: 2022-06-26

## 2022-07-27 ENCOUNTER — NURSE TRIAGE (OUTPATIENT)
Dept: OTHER | Facility: CLINIC | Age: 52
End: 2022-07-27

## 2022-07-27 NOTE — TELEPHONE ENCOUNTER
Received call from [facility] with [employer] Complaint. Subjective: Caller states \"left sided chest pain\"     Current Symptoms: left sided chest pain    Onset: 3 weeks ago; unchanged    Associated Symptoms:  none    Pain Severity: 4/10; sharp; constant    Temperature: denies    What has been tried: nothing    LMP: NA Pregnant: NA    Recommended disposition: Go to ED Now for further evaluation of chest pain. Care advice provided, patient verbalizes understanding; denies any other questions or concerns; instructed to call back for any new or worsening symptoms. Patient/caller agrees to proceed to [facility] Emergency Department    Attention Provider: Thank you for allowing me to participate in the care of your patient. The patient was connected to triage in response to information provided to the [facility]. Please do not respond through this encounter as the response is not directed to a shared pool.     Reason for Disposition   Pain also in shoulder(s) or arm(s) or jaw    Protocols used: Chest Pain-ADULT-[place]

## 2022-09-20 RX ORDER — IPRATROPIUM BROMIDE AND ALBUTEROL SULFATE 2.5; .5 MG/3ML; MG/3ML
SOLUTION RESPIRATORY (INHALATION)
Qty: 450 ML | Refills: 11 | Status: SHIPPED | OUTPATIENT
Start: 2022-09-20

## 2023-01-10 RX ORDER — LIRAGLUTIDE 6 MG/ML
1.8 INJECTION SUBCUTANEOUS DAILY
Qty: 6 ML | Refills: 0 | Status: SHIPPED | OUTPATIENT
Start: 2023-01-10

## 2023-01-11 RX ORDER — CYCLOBENZAPRINE HCL 10 MG
10 TABLET ORAL
Qty: 60 TABLET | Refills: 2 | Status: SHIPPED | OUTPATIENT
Start: 2023-01-11

## 2023-01-29 RX ORDER — LIRAGLUTIDE 6 MG/ML
INJECTION SUBCUTANEOUS
Qty: 6 ML | Refills: 0 | Status: SHIPPED | OUTPATIENT
Start: 2023-01-29

## 2023-03-06 NOTE — PROGRESS NOTES
HISTORY OF PRESENT ILLNESS  Yahaira Bai is a 49 y.o. female. HPI    ESTABLISHED patient here on POD #1 after port-a-cath scar revision of the RIGHT chest.  Patient noticed this morning that her scar had opened up. She can see the sutures underneath. Is not having any pain and feels well today. 10/13/16: LT lumpectomy with SNBx and RT reduction mammoplasty. LT breast: 3 cm, gr 3 IDC. 0/4 LN involved. Triple negative. Clear margins. pT2 pN0(sn) pMx. RT breast: Benign. 01/31/17: s/p adjuvant chemo (TAC x4) with Dr. Kaley Grace, stopped due to peripheral neuropathy. 03/2017: started XRT (6/30 treatments) at [facility].     [facility]    Physical Exam    ASSESSMENT and PLAN  {ASSESSMENT/PLAN:64266} Patient is aware

## (undated) DEVICE — CHEST PACK: Brand: MEDLINE INDUSTRIES, INC.

## (undated) DEVICE — SOL IRRIGATION INJ NACL 0.9% 500ML BTL

## (undated) DEVICE — DRAPE,LAPAROTOMY,T,PEDI,STERILE: Brand: MEDLINE

## (undated) DEVICE — ROCKER SWITCH PENCIL BLADE ELECTRODE, HOLSTER: Brand: EDGE

## (undated) DEVICE — SUTURE MCRYL SZ 4-0 L27IN ABSRB UD L19MM PS-2 1/2 CIR PRIM Y426H

## (undated) DEVICE — DEVON™ KNEE AND BODY STRAP 60" X 3" (1.5 M X 7.6 CM): Brand: DEVON

## (undated) DEVICE — INSULATED BLADE ELECTRODE: Brand: EDGE

## (undated) DEVICE — STERILE POLYISOPRENE POWDER-FREE SURGICAL GLOVES: Brand: PROTEXIS

## (undated) DEVICE — (D)PREP SKN CHLRAPRP APPL 26ML -- CONVERT TO ITEM 371833

## (undated) DEVICE — DERMABOND SKIN ADH 0.7ML -- DERMABOND ADVANCED 12/BX

## (undated) DEVICE — SUT PROL 3-0 18IN PS2 BLU --

## (undated) DEVICE — KENDALL SCD EXPRESS SLEEVES, KNEE LENGTH, MEDIUM: Brand: KENDALL SCD

## (undated) DEVICE — Device

## (undated) DEVICE — SUTURE VCRL SZ 3-0 L27IN ABSRB UD L26MM SH 1/2 CIR J416H

## (undated) DEVICE — SYR 3ML LL TIP 1/10ML GRAD --

## (undated) DEVICE — DEVICE TRNSF SPIK STL 2008S] MICROTEK MEDICAL INC]

## (undated) DEVICE — (D)SYR 10ML 1/5ML GRAD NSAF -- PKGING CHANGE USE ITEM 338027

## (undated) DEVICE — NEEDLE HYPO 22GA L1.5IN BLK S STL HUB POLYPR SHLD REG BVL

## (undated) DEVICE — LIGHT HANDLE: Brand: DEVON

## (undated) DEVICE — INFECTION CONTROL KIT SYS

## (undated) DEVICE — SOLUTION IV 1000ML 0.9% SOD CHL

## (undated) DEVICE — NEEDLE HYPO 25GA L1.5IN BVL ORIENTED ECLIPSE

## (undated) DEVICE — NEEDLE HYPO 18GA L1.5IN PNK S STL HUB POLYPR SHLD REG BVL

## (undated) DEVICE — REM POLYHESIVE ADULT PATIENT RETURN ELECTRODE: Brand: VALLEYLAB